# Patient Record
Sex: FEMALE | Race: WHITE | HISPANIC OR LATINO | Employment: UNEMPLOYED | URBAN - METROPOLITAN AREA
[De-identification: names, ages, dates, MRNs, and addresses within clinical notes are randomized per-mention and may not be internally consistent; named-entity substitution may affect disease eponyms.]

---

## 2023-04-11 ENCOUNTER — APPOINTMENT (OUTPATIENT)
Dept: GENERAL RADIOLOGY | Facility: HOSPITAL | Age: 38
DRG: 981 | End: 2023-04-11
Payer: COMMERCIAL

## 2023-04-11 ENCOUNTER — ANESTHESIA EVENT (OUTPATIENT)
Dept: PERIOP | Facility: HOSPITAL | Age: 38
DRG: 981 | End: 2023-04-11
Payer: COMMERCIAL

## 2023-04-11 ENCOUNTER — HOSPITAL ENCOUNTER (INPATIENT)
Facility: HOSPITAL | Age: 38
LOS: 2 days | Discharge: HOME | DRG: 981 | End: 2023-04-13
Attending: EMERGENCY MEDICINE | Admitting: STUDENT IN AN ORGANIZED HEALTH CARE EDUCATION/TRAINING PROGRAM
Payer: COMMERCIAL

## 2023-04-11 ENCOUNTER — APPOINTMENT (OUTPATIENT)
Dept: CT IMAGING | Facility: HOSPITAL | Age: 38
DRG: 981 | End: 2023-04-11
Payer: COMMERCIAL

## 2023-04-11 ENCOUNTER — ANESTHESIA (OUTPATIENT)
Dept: PERIOP | Facility: HOSPITAL | Age: 38
DRG: 981 | End: 2023-04-11
Payer: COMMERCIAL

## 2023-04-11 DIAGNOSIS — R22.1 MASS OF RIGHT SIDE OF NECK: ICD-10-CM

## 2023-04-11 DIAGNOSIS — R93.89: Primary | ICD-10-CM

## 2023-04-11 LAB
ALBUMIN SERPL-MCNC: 4.7 G/DL (ref 3.5–5.2)
ALBUMIN/GLOB SERPL: 1.7 G/DL
ALP SERPL-CCNC: 75 U/L (ref 39–117)
ALT SERPL W P-5'-P-CCNC: 13 U/L (ref 1–33)
ANION GAP SERPL CALCULATED.3IONS-SCNC: 11.9 MMOL/L (ref 5–15)
AST SERPL-CCNC: 12 U/L (ref 1–32)
BASOPHILS # BLD AUTO: 0.09 10*3/MM3 (ref 0–0.2)
BASOPHILS NFR BLD AUTO: 0.7 % (ref 0–1.5)
BILIRUB SERPL-MCNC: 0.5 MG/DL (ref 0–1.2)
BUN SERPL-MCNC: 9 MG/DL (ref 6–20)
BUN/CREAT SERPL: 12.7 (ref 7–25)
CALCIUM SPEC-SCNC: 10 MG/DL (ref 8.6–10.5)
CHLORIDE SERPL-SCNC: 101 MMOL/L (ref 98–107)
CO2 SERPL-SCNC: 27.1 MMOL/L (ref 22–29)
CREAT SERPL-MCNC: 0.71 MG/DL (ref 0.57–1)
D-LACTATE SERPL-SCNC: 0.7 MMOL/L (ref 0.5–2)
DEPRECATED RDW RBC AUTO: 42.5 FL (ref 37–54)
EGFRCR SERPLBLD CKD-EPI 2021: 113.9 ML/MIN/1.73
EOSINOPHIL # BLD AUTO: 0.96 10*3/MM3 (ref 0–0.4)
EOSINOPHIL NFR BLD AUTO: 7.2 % (ref 0.3–6.2)
ERYTHROCYTE [DISTWIDTH] IN BLOOD BY AUTOMATED COUNT: 12.4 % (ref 12.3–15.4)
GEN 5 2HR TROPONIN T REFLEX: <6 NG/L
GLOBULIN UR ELPH-MCNC: 2.7 GM/DL
GLUCOSE SERPL-MCNC: 101 MG/DL (ref 65–99)
HCG SERPL QL: NEGATIVE
HCT VFR BLD AUTO: 48.5 % (ref 34–46.6)
HGB BLD-MCNC: 15.2 G/DL (ref 12–15.9)
HOLD SPECIMEN: NORMAL
IMM GRANULOCYTES # BLD AUTO: 0.04 10*3/MM3 (ref 0–0.05)
IMM GRANULOCYTES NFR BLD AUTO: 0.3 % (ref 0–0.5)
LYMPHOCYTES # BLD AUTO: 2.11 10*3/MM3 (ref 0.7–3.1)
LYMPHOCYTES NFR BLD AUTO: 15.8 % (ref 19.6–45.3)
MCH RBC QN AUTO: 29.2 PG (ref 26.6–33)
MCHC RBC AUTO-ENTMCNC: 31.3 G/DL (ref 31.5–35.7)
MCV RBC AUTO: 93.1 FL (ref 79–97)
MONOCYTES # BLD AUTO: 0.89 10*3/MM3 (ref 0.1–0.9)
MONOCYTES NFR BLD AUTO: 6.6 % (ref 5–12)
NEUTROPHILS NFR BLD AUTO: 69.4 % (ref 42.7–76)
NEUTROPHILS NFR BLD AUTO: 9.3 10*3/MM3 (ref 1.7–7)
NRBC BLD AUTO-RTO: 0 /100 WBC (ref 0–0.2)
PLATELET # BLD AUTO: 466 10*3/MM3 (ref 140–450)
PMV BLD AUTO: 9.8 FL (ref 6–12)
POTASSIUM SERPL-SCNC: 4.1 MMOL/L (ref 3.5–5.2)
PROT SERPL-MCNC: 7.4 G/DL (ref 6–8.5)
QT INTERVAL: 350 MS
QT INTERVAL: 373 MS
RBC # BLD AUTO: 5.21 10*6/MM3 (ref 3.77–5.28)
SODIUM SERPL-SCNC: 140 MMOL/L (ref 136–145)
T4 FREE SERPL-MCNC: 1.06 NG/DL (ref 0.93–1.7)
TROPONIN T DELTA: NORMAL
TROPONIN T SERPL HS-MCNC: <6 NG/L
TSH SERPL DL<=0.05 MIU/L-ACNC: 1.86 UIU/ML (ref 0.27–4.2)
WBC NRBC COR # BLD: 13.39 10*3/MM3 (ref 3.4–10.8)
WHOLE BLOOD HOLD COAG: NORMAL
WHOLE BLOOD HOLD SPECIMEN: NORMAL

## 2023-04-11 PROCEDURE — G0378 HOSPITAL OBSERVATION PER HR: HCPCS

## 2023-04-11 PROCEDURE — 25010000002 PROPOFOL 10 MG/ML EMULSION: Performed by: ANESTHESIOLOGY

## 2023-04-11 PROCEDURE — 93005 ELECTROCARDIOGRAM TRACING: CPT | Performed by: EMERGENCY MEDICINE

## 2023-04-11 PROCEDURE — 25010000002 PHENYLEPHRINE 10 MG/ML SOLUTION: Performed by: ANESTHESIOLOGY

## 2023-04-11 PROCEDURE — 25510000001 IOPAMIDOL 61 % SOLUTION: Performed by: EMERGENCY MEDICINE

## 2023-04-11 PROCEDURE — 25010000002 DIPHENHYDRAMINE PER 50 MG: Performed by: ANESTHESIOLOGY

## 2023-04-11 PROCEDURE — 84484 ASSAY OF TROPONIN QUANT: CPT

## 2023-04-11 PROCEDURE — 25010000002 FENTANYL CITRATE (PF) 50 MCG/ML SOLUTION: Performed by: ANESTHESIOLOGY

## 2023-04-11 PROCEDURE — 25010000002 HYDROMORPHONE 1 MG/ML SOLUTION: Performed by: NURSE ANESTHETIST, CERTIFIED REGISTERED

## 2023-04-11 PROCEDURE — 25010000002 MIDAZOLAM PER 1 MG: Performed by: ANESTHESIOLOGY

## 2023-04-11 PROCEDURE — 0 DIATRIZOATE MEGLUMINE & SODIUM PER 1 ML: Performed by: EMERGENCY MEDICINE

## 2023-04-11 PROCEDURE — 43235 EGD DIAGNOSTIC BRUSH WASH: CPT | Performed by: SURGERY

## 2023-04-11 PROCEDURE — 0BJ08ZZ INSPECTION OF TRACHEOBRONCHIAL TREE, VIA NATURAL OR ARTIFICIAL OPENING ENDOSCOPIC: ICD-10-PCS | Performed by: SURGERY

## 2023-04-11 PROCEDURE — 36415 COLL VENOUS BLD VENIPUNCTURE: CPT

## 2023-04-11 PROCEDURE — 70491 CT SOFT TISSUE NECK W/DYE: CPT

## 2023-04-11 PROCEDURE — 87040 BLOOD CULTURE FOR BACTERIA: CPT | Performed by: EMERGENCY MEDICINE

## 2023-04-11 PROCEDURE — 99285 EMERGENCY DEPT VISIT HI MDM: CPT

## 2023-04-11 PROCEDURE — 25010000002 HYDROMORPHONE PER 4 MG: Performed by: ANESTHESIOLOGY

## 2023-04-11 PROCEDURE — 25010000002 VANCOMYCIN 10 G RECONSTITUTED SOLUTION: Performed by: EMERGENCY MEDICINE

## 2023-04-11 PROCEDURE — 88307 TISSUE EXAM BY PATHOLOGIST: CPT | Performed by: SURGERY

## 2023-04-11 PROCEDURE — 71046 X-RAY EXAM CHEST 2 VIEWS: CPT

## 2023-04-11 PROCEDURE — 0WB60ZZ EXCISION OF NECK, OPEN APPROACH: ICD-10-PCS | Performed by: SURGERY

## 2023-04-11 PROCEDURE — 25010000002 DEXAMETHASONE PER 1 MG: Performed by: ANESTHESIOLOGY

## 2023-04-11 PROCEDURE — 21555 EXC NECK LES SC < 3 CM: CPT | Performed by: REGISTERED NURSE

## 2023-04-11 PROCEDURE — 80050 GENERAL HEALTH PANEL: CPT

## 2023-04-11 PROCEDURE — 84703 CHORIONIC GONADOTROPIN ASSAY: CPT

## 2023-04-11 PROCEDURE — 0DJ08ZZ INSPECTION OF UPPER INTESTINAL TRACT, VIA NATURAL OR ARTIFICIAL OPENING ENDOSCOPIC: ICD-10-PCS | Performed by: SURGERY

## 2023-04-11 PROCEDURE — 25010000002 ONDANSETRON PER 1 MG: Performed by: NURSE ANESTHETIST, CERTIFIED REGISTERED

## 2023-04-11 PROCEDURE — 25510000001 IOPAMIDOL PER 1 ML: Performed by: EMERGENCY MEDICINE

## 2023-04-11 PROCEDURE — 99233 SBSQ HOSP IP/OBS HIGH 50: CPT | Performed by: SURGERY

## 2023-04-11 PROCEDURE — 74220 X-RAY XM ESOPHAGUS 1CNTRST: CPT

## 2023-04-11 PROCEDURE — 93010 ELECTROCARDIOGRAM REPORT: CPT | Performed by: INTERNAL MEDICINE

## 2023-04-11 PROCEDURE — 93005 ELECTROCARDIOGRAM TRACING: CPT

## 2023-04-11 PROCEDURE — 71045 X-RAY EXAM CHEST 1 VIEW: CPT

## 2023-04-11 PROCEDURE — 84439 ASSAY OF FREE THYROXINE: CPT | Performed by: EMERGENCY MEDICINE

## 2023-04-11 PROCEDURE — 25010000002 PIPERACILLIN SOD-TAZOBACTAM PER 1 G: Performed by: EMERGENCY MEDICINE

## 2023-04-11 PROCEDURE — 83605 ASSAY OF LACTIC ACID: CPT | Performed by: EMERGENCY MEDICINE

## 2023-04-11 PROCEDURE — 0WJ60ZZ INSPECTION OF NECK, OPEN APPROACH: ICD-10-PCS | Performed by: SURGERY

## 2023-04-11 PROCEDURE — 71275 CT ANGIOGRAPHY CHEST: CPT

## 2023-04-11 PROCEDURE — 21555 EXC NECK LES SC < 3 CM: CPT | Performed by: SURGERY

## 2023-04-11 DEVICE — LIGACLIP MCA MULTIPLE CLIP APPLIERS, 20 MEDIUM CLIPS
Type: IMPLANTABLE DEVICE | Site: NECK | Status: FUNCTIONAL
Brand: LIGACLIP

## 2023-04-11 RX ORDER — ROCURONIUM BROMIDE 10 MG/ML
INJECTION, SOLUTION INTRAVENOUS AS NEEDED
Status: DISCONTINUED | OUTPATIENT
Start: 2023-04-11 | End: 2023-04-11 | Stop reason: SURG

## 2023-04-11 RX ORDER — PROMETHAZINE HYDROCHLORIDE 25 MG/1
25 SUPPOSITORY RECTAL ONCE AS NEEDED
Status: DISCONTINUED | OUTPATIENT
Start: 2023-04-11 | End: 2023-04-11

## 2023-04-11 RX ORDER — SODIUM CHLORIDE, SODIUM LACTATE, POTASSIUM CHLORIDE, CALCIUM CHLORIDE 600; 310; 30; 20 MG/100ML; MG/100ML; MG/100ML; MG/100ML
9 INJECTION, SOLUTION INTRAVENOUS CONTINUOUS
Status: DISCONTINUED | OUTPATIENT
Start: 2023-04-11 | End: 2023-04-13

## 2023-04-11 RX ORDER — ONDANSETRON 2 MG/ML
INJECTION INTRAMUSCULAR; INTRAVENOUS AS NEEDED
Status: DISCONTINUED | OUTPATIENT
Start: 2023-04-11 | End: 2023-04-11 | Stop reason: SURG

## 2023-04-11 RX ORDER — PROMETHAZINE HYDROCHLORIDE 25 MG/1
25 TABLET ORAL ONCE AS NEEDED
Status: DISCONTINUED | OUTPATIENT
Start: 2023-04-11 | End: 2023-04-11

## 2023-04-11 RX ORDER — IPRATROPIUM BROMIDE AND ALBUTEROL SULFATE 2.5; .5 MG/3ML; MG/3ML
3 SOLUTION RESPIRATORY (INHALATION) ONCE AS NEEDED
Status: DISCONTINUED | OUTPATIENT
Start: 2023-04-11 | End: 2023-04-11

## 2023-04-11 RX ORDER — DIPHENHYDRAMINE HYDROCHLORIDE 50 MG/ML
12.5 INJECTION INTRAMUSCULAR; INTRAVENOUS
Status: DISCONTINUED | OUTPATIENT
Start: 2023-04-11 | End: 2023-04-11

## 2023-04-11 RX ORDER — HYDROCODONE BITARTRATE AND ACETAMINOPHEN 7.5; 325 MG/1; MG/1
1 TABLET ORAL EVERY 4 HOURS PRN
Status: DISCONTINUED | OUTPATIENT
Start: 2023-04-11 | End: 2023-04-11

## 2023-04-11 RX ORDER — ONDANSETRON 2 MG/ML
4 INJECTION INTRAMUSCULAR; INTRAVENOUS ONCE AS NEEDED
Status: DISCONTINUED | OUTPATIENT
Start: 2023-04-11 | End: 2023-04-11

## 2023-04-11 RX ORDER — KETOROLAC TROMETHAMINE 30 MG/ML
15 INJECTION, SOLUTION INTRAMUSCULAR; INTRAVENOUS EVERY 8 HOURS
Status: DISCONTINUED | OUTPATIENT
Start: 2023-04-11 | End: 2023-04-11 | Stop reason: HOSPADM

## 2023-04-11 RX ORDER — ONDANSETRON 2 MG/ML
4 INJECTION INTRAMUSCULAR; INTRAVENOUS EVERY 6 HOURS PRN
Status: DISCONTINUED | OUTPATIENT
Start: 2023-04-11 | End: 2023-04-13 | Stop reason: HOSPADM

## 2023-04-11 RX ORDER — HYDROCODONE BITARTRATE AND ACETAMINOPHEN 5; 325 MG/1; MG/1
1 TABLET ORAL ONCE AS NEEDED
Status: DISCONTINUED | OUTPATIENT
Start: 2023-04-11 | End: 2023-04-11

## 2023-04-11 RX ORDER — SODIUM CHLORIDE 0.9 % (FLUSH) 0.9 %
3 SYRINGE (ML) INJECTION EVERY 12 HOURS SCHEDULED
Status: DISCONTINUED | OUTPATIENT
Start: 2023-04-11 | End: 2023-04-11 | Stop reason: HOSPADM

## 2023-04-11 RX ORDER — FENTANYL CITRATE 50 UG/ML
50 INJECTION, SOLUTION INTRAMUSCULAR; INTRAVENOUS
Status: DISCONTINUED | OUTPATIENT
Start: 2023-04-11 | End: 2023-04-11

## 2023-04-11 RX ORDER — SODIUM CHLORIDE, SODIUM LACTATE, POTASSIUM CHLORIDE, CALCIUM CHLORIDE 600; 310; 30; 20 MG/100ML; MG/100ML; MG/100ML; MG/100ML
INJECTION, SOLUTION INTRAVENOUS CONTINUOUS PRN
Status: DISCONTINUED | OUTPATIENT
Start: 2023-04-11 | End: 2023-04-11 | Stop reason: SURG

## 2023-04-11 RX ORDER — LIDOCAINE HYDROCHLORIDE 20 MG/ML
INJECTION, SOLUTION INFILTRATION; PERINEURAL AS NEEDED
Status: DISCONTINUED | OUTPATIENT
Start: 2023-04-11 | End: 2023-04-11 | Stop reason: SURG

## 2023-04-11 RX ORDER — ASPIRIN 325 MG
325 TABLET ORAL ONCE
Status: COMPLETED | OUTPATIENT
Start: 2023-04-11 | End: 2023-04-11

## 2023-04-11 RX ORDER — NALOXONE HCL 0.4 MG/ML
0.2 VIAL (ML) INJECTION AS NEEDED
Status: DISCONTINUED | OUTPATIENT
Start: 2023-04-11 | End: 2023-04-11

## 2023-04-11 RX ORDER — ENOXAPARIN SODIUM 100 MG/ML
40 INJECTION SUBCUTANEOUS NIGHTLY
Status: DISCONTINUED | OUTPATIENT
Start: 2023-04-12 | End: 2023-04-13 | Stop reason: HOSPADM

## 2023-04-11 RX ORDER — LIDOCAINE HYDROCHLORIDE 10 MG/ML
0.5 INJECTION, SOLUTION EPIDURAL; INFILTRATION; INTRACAUDAL; PERINEURAL ONCE AS NEEDED
Status: DISCONTINUED | OUTPATIENT
Start: 2023-04-11 | End: 2023-04-11 | Stop reason: HOSPADM

## 2023-04-11 RX ORDER — SODIUM CHLORIDE 0.9 % (FLUSH) 0.9 %
3-10 SYRINGE (ML) INJECTION AS NEEDED
Status: DISCONTINUED | OUTPATIENT
Start: 2023-04-11 | End: 2023-04-11 | Stop reason: HOSPADM

## 2023-04-11 RX ORDER — DROPERIDOL 2.5 MG/ML
0.62 INJECTION, SOLUTION INTRAMUSCULAR; INTRAVENOUS
Status: DISCONTINUED | OUTPATIENT
Start: 2023-04-11 | End: 2023-04-11

## 2023-04-11 RX ORDER — POLYETHYLENE GLYCOL 3350 17 G/17G
17 POWDER, FOR SOLUTION ORAL DAILY
Status: DISCONTINUED | OUTPATIENT
Start: 2023-04-12 | End: 2023-04-13 | Stop reason: HOSPADM

## 2023-04-11 RX ORDER — DOCUSATE SODIUM 100 MG/1
100 CAPSULE, LIQUID FILLED ORAL 2 TIMES DAILY
Status: DISCONTINUED | OUTPATIENT
Start: 2023-04-11 | End: 2023-04-13 | Stop reason: HOSPADM

## 2023-04-11 RX ORDER — MAGNESIUM HYDROXIDE 1200 MG/15ML
LIQUID ORAL AS NEEDED
Status: DISCONTINUED | OUTPATIENT
Start: 2023-04-11 | End: 2023-04-11 | Stop reason: HOSPADM

## 2023-04-11 RX ORDER — ACETAMINOPHEN 325 MG/1
650 TABLET ORAL EVERY 4 HOURS PRN
Status: DISCONTINUED | OUTPATIENT
Start: 2023-04-11 | End: 2023-04-11

## 2023-04-11 RX ORDER — PHENYLEPHRINE HYDROCHLORIDE 10 MG/ML
INJECTION INTRAVENOUS AS NEEDED
Status: DISCONTINUED | OUTPATIENT
Start: 2023-04-11 | End: 2023-04-11 | Stop reason: SURG

## 2023-04-11 RX ORDER — SODIUM CHLORIDE, SODIUM LACTATE, POTASSIUM CHLORIDE, CALCIUM CHLORIDE 600; 310; 30; 20 MG/100ML; MG/100ML; MG/100ML; MG/100ML
75 INJECTION, SOLUTION INTRAVENOUS CONTINUOUS
Status: DISCONTINUED | OUTPATIENT
Start: 2023-04-11 | End: 2023-04-13

## 2023-04-11 RX ORDER — MIDAZOLAM HYDROCHLORIDE 1 MG/ML
INJECTION INTRAMUSCULAR; INTRAVENOUS AS NEEDED
Status: DISCONTINUED | OUTPATIENT
Start: 2023-04-11 | End: 2023-04-11 | Stop reason: SURG

## 2023-04-11 RX ORDER — LABETALOL HYDROCHLORIDE 5 MG/ML
5 INJECTION, SOLUTION INTRAVENOUS
Status: DISCONTINUED | OUTPATIENT
Start: 2023-04-11 | End: 2023-04-11

## 2023-04-11 RX ORDER — DIPHENHYDRAMINE HYDROCHLORIDE 50 MG/ML
25 INJECTION INTRAMUSCULAR; INTRAVENOUS ONCE
Status: COMPLETED | OUTPATIENT
Start: 2023-04-11 | End: 2023-04-11

## 2023-04-11 RX ORDER — UREA 10 %
3 LOTION (ML) TOPICAL NIGHTLY PRN
Status: DISCONTINUED | OUTPATIENT
Start: 2023-04-11 | End: 2023-04-13 | Stop reason: HOSPADM

## 2023-04-11 RX ORDER — HYDRALAZINE HYDROCHLORIDE 20 MG/ML
5 INJECTION INTRAMUSCULAR; INTRAVENOUS
Status: DISCONTINUED | OUTPATIENT
Start: 2023-04-11 | End: 2023-04-11

## 2023-04-11 RX ORDER — PROPOFOL 10 MG/ML
VIAL (ML) INTRAVENOUS AS NEEDED
Status: DISCONTINUED | OUTPATIENT
Start: 2023-04-11 | End: 2023-04-11 | Stop reason: SURG

## 2023-04-11 RX ORDER — HYDROMORPHONE HYDROCHLORIDE 1 MG/ML
0.5 INJECTION, SOLUTION INTRAMUSCULAR; INTRAVENOUS; SUBCUTANEOUS
Status: DISCONTINUED | OUTPATIENT
Start: 2023-04-11 | End: 2023-04-13 | Stop reason: HOSPADM

## 2023-04-11 RX ORDER — SODIUM CHLORIDE 0.9 % (FLUSH) 0.9 %
10 SYRINGE (ML) INJECTION AS NEEDED
Status: DISCONTINUED | OUTPATIENT
Start: 2023-04-11 | End: 2023-04-11

## 2023-04-11 RX ORDER — SODIUM CHLORIDE 9 MG/ML
125 INJECTION, SOLUTION INTRAVENOUS CONTINUOUS
Status: DISCONTINUED | OUTPATIENT
Start: 2023-04-11 | End: 2023-04-13

## 2023-04-11 RX ORDER — ALBUTEROL SULFATE 2.5 MG/3ML
2.5 SOLUTION RESPIRATORY (INHALATION) EVERY 4 HOURS PRN
Status: DISCONTINUED | OUTPATIENT
Start: 2023-04-11 | End: 2023-04-13 | Stop reason: HOSPADM

## 2023-04-11 RX ORDER — HYDROMORPHONE HYDROCHLORIDE 1 MG/ML
0.5 INJECTION, SOLUTION INTRAMUSCULAR; INTRAVENOUS; SUBCUTANEOUS
Status: DISCONTINUED | OUTPATIENT
Start: 2023-04-11 | End: 2023-04-11

## 2023-04-11 RX ORDER — FLUMAZENIL 0.1 MG/ML
0.2 INJECTION INTRAVENOUS AS NEEDED
Status: DISCONTINUED | OUTPATIENT
Start: 2023-04-11 | End: 2023-04-11

## 2023-04-11 RX ORDER — ONDANSETRON 4 MG/1
4 TABLET, FILM COATED ORAL EVERY 6 HOURS PRN
Status: DISCONTINUED | OUTPATIENT
Start: 2023-04-11 | End: 2023-04-13 | Stop reason: HOSPADM

## 2023-04-11 RX ORDER — EPHEDRINE SULFATE 50 MG/ML
5 INJECTION, SOLUTION INTRAVENOUS ONCE AS NEEDED
Status: DISCONTINUED | OUTPATIENT
Start: 2023-04-11 | End: 2023-04-11

## 2023-04-11 RX ORDER — MIDAZOLAM HYDROCHLORIDE 1 MG/ML
1 INJECTION INTRAMUSCULAR; INTRAVENOUS
Status: DISCONTINUED | OUTPATIENT
Start: 2023-04-11 | End: 2023-04-11 | Stop reason: HOSPADM

## 2023-04-11 RX ORDER — NITROGLYCERIN 0.4 MG/1
0.4 TABLET SUBLINGUAL
Status: DISCONTINUED | OUTPATIENT
Start: 2023-04-11 | End: 2023-04-13 | Stop reason: HOSPADM

## 2023-04-11 RX ORDER — FENTANYL CITRATE 50 UG/ML
INJECTION, SOLUTION INTRAMUSCULAR; INTRAVENOUS AS NEEDED
Status: DISCONTINUED | OUTPATIENT
Start: 2023-04-11 | End: 2023-04-11 | Stop reason: SURG

## 2023-04-11 RX ORDER — ACETAMINOPHEN 500 MG
1000 TABLET ORAL 3 TIMES DAILY
Status: DISCONTINUED | OUTPATIENT
Start: 2023-04-11 | End: 2023-04-13 | Stop reason: HOSPADM

## 2023-04-11 RX ORDER — DEXAMETHASONE SODIUM PHOSPHATE 10 MG/ML
8 INJECTION INTRAMUSCULAR; INTRAVENOUS EVERY 6 HOURS
Status: DISCONTINUED | OUTPATIENT
Start: 2023-04-11 | End: 2023-04-11 | Stop reason: HOSPADM

## 2023-04-11 RX ORDER — BUDESONIDE AND FORMOTEROL FUMARATE DIHYDRATE 160; 4.5 UG/1; UG/1
2 AEROSOL RESPIRATORY (INHALATION)
Status: DISCONTINUED | OUTPATIENT
Start: 2023-04-11 | End: 2023-04-13 | Stop reason: HOSPADM

## 2023-04-11 RX ORDER — FENTANYL CITRATE 50 UG/ML
50 INJECTION, SOLUTION INTRAMUSCULAR; INTRAVENOUS
Status: DISCONTINUED | OUTPATIENT
Start: 2023-04-11 | End: 2023-04-11 | Stop reason: HOSPADM

## 2023-04-11 RX ORDER — FAMOTIDINE 10 MG/ML
20 INJECTION, SOLUTION INTRAVENOUS ONCE
Status: COMPLETED | OUTPATIENT
Start: 2023-04-11 | End: 2023-04-11

## 2023-04-11 RX ORDER — MEPERIDINE HYDROCHLORIDE 25 MG/ML
12.5 INJECTION INTRAMUSCULAR; INTRAVENOUS; SUBCUTANEOUS
Status: DISCONTINUED | OUTPATIENT
Start: 2023-04-11 | End: 2023-04-11

## 2023-04-11 RX ORDER — METRONIDAZOLE 500 MG/100ML
500 INJECTION, SOLUTION INTRAVENOUS ONCE
Status: COMPLETED | OUTPATIENT
Start: 2023-04-11 | End: 2023-04-11

## 2023-04-11 RX ORDER — SODIUM CHLORIDE 0.9 % (FLUSH) 0.9 %
10 SYRINGE (ML) INJECTION AS NEEDED
Status: DISCONTINUED | OUTPATIENT
Start: 2023-04-11 | End: 2023-04-13 | Stop reason: HOSPADM

## 2023-04-11 RX ADMIN — IOPAMIDOL 95 ML: 755 INJECTION, SOLUTION INTRAVENOUS at 15:04

## 2023-04-11 RX ADMIN — HYDROMORPHONE HYDROCHLORIDE 0.5 MG: 1 INJECTION, SOLUTION INTRAMUSCULAR; INTRAVENOUS; SUBCUTANEOUS at 23:15

## 2023-04-11 RX ADMIN — LIDOCAINE HYDROCHLORIDE 80 MG: 20 INJECTION, SOLUTION INFILTRATION; PERINEURAL at 20:32

## 2023-04-11 RX ADMIN — ROCURONIUM BROMIDE 70 MG: 10 INJECTION, SOLUTION INTRAVENOUS at 20:32

## 2023-04-11 RX ADMIN — SUGAMMADEX 200 MG: 100 INJECTION, SOLUTION INTRAVENOUS at 22:35

## 2023-04-11 RX ADMIN — PHENYLEPHRINE HYDROCHLORIDE 100 MCG: 10 INJECTION, SOLUTION INTRAVENOUS at 21:37

## 2023-04-11 RX ADMIN — DIATRIZOATE MEGLUMINE AND DIATRIZOATE SODIUM 120 ML: 600; 100 SOLUTION ORAL; RECTAL at 18:00

## 2023-04-11 RX ADMIN — METRONIDAZOLE 500 MG: 500 INJECTION, SOLUTION INTRAVENOUS at 17:40

## 2023-04-11 RX ADMIN — MIDAZOLAM 2 MG: 1 INJECTION INTRAMUSCULAR; INTRAVENOUS at 20:23

## 2023-04-11 RX ADMIN — FAMOTIDINE 20 MG: 10 INJECTION INTRAVENOUS at 20:01

## 2023-04-11 RX ADMIN — SODIUM CHLORIDE, POTASSIUM CHLORIDE, SODIUM LACTATE AND CALCIUM CHLORIDE: 600; 310; 30; 20 INJECTION, SOLUTION INTRAVENOUS at 20:26

## 2023-04-11 RX ADMIN — ASPIRIN 325 MG: 325 TABLET ORAL at 12:02

## 2023-04-11 RX ADMIN — PHENYLEPHRINE HYDROCHLORIDE 100 MCG: 10 INJECTION, SOLUTION INTRAVENOUS at 20:55

## 2023-04-11 RX ADMIN — SODIUM CHLORIDE, POTASSIUM CHLORIDE, SODIUM LACTATE AND CALCIUM CHLORIDE 9 ML/HR: 600; 310; 30; 20 INJECTION, SOLUTION INTRAVENOUS at 20:02

## 2023-04-11 RX ADMIN — SODIUM CHLORIDE, POTASSIUM CHLORIDE, SODIUM LACTATE AND CALCIUM CHLORIDE: 600; 310; 30; 20 INJECTION, SOLUTION INTRAVENOUS at 21:23

## 2023-04-11 RX ADMIN — PROPOFOL 30 MG: 10 INJECTION, EMULSION INTRAVENOUS at 20:34

## 2023-04-11 RX ADMIN — HYDROMORPHONE HYDROCHLORIDE 0.5 MG: 1 INJECTION, SOLUTION INTRAMUSCULAR; INTRAVENOUS; SUBCUTANEOUS at 22:33

## 2023-04-11 RX ADMIN — ONDANSETRON 4 MG: 2 INJECTION INTRAMUSCULAR; INTRAVENOUS at 22:11

## 2023-04-11 RX ADMIN — DEXAMETHASONE SODIUM PHOSPHATE 8 MG: 10 INJECTION INTRAMUSCULAR; INTRAVENOUS at 20:01

## 2023-04-11 RX ADMIN — TAZOBACTAM SODIUM AND PIPERACILLIN SODIUM 3.38 G: 375; 3 INJECTION, SOLUTION INTRAVENOUS at 16:39

## 2023-04-11 RX ADMIN — VANCOMYCIN HYDROCHLORIDE 1500 MG: 10 INJECTION, POWDER, LYOPHILIZED, FOR SOLUTION INTRAVENOUS at 18:48

## 2023-04-11 RX ADMIN — PHENYLEPHRINE HYDROCHLORIDE 100 MCG: 10 INJECTION, SOLUTION INTRAVENOUS at 20:50

## 2023-04-11 RX ADMIN — DIPHENHYDRAMINE HYDROCHLORIDE 25 MG: 50 INJECTION, SOLUTION INTRAMUSCULAR; INTRAVENOUS at 20:01

## 2023-04-11 RX ADMIN — SODIUM CHLORIDE 125 ML/HR: 9 INJECTION, SOLUTION INTRAVENOUS at 17:31

## 2023-04-11 RX ADMIN — PROPOFOL 100 MG: 10 INJECTION, EMULSION INTRAVENOUS at 20:32

## 2023-04-11 RX ADMIN — HYDROMORPHONE HYDROCHLORIDE 0.5 MG: 1 INJECTION, SOLUTION INTRAMUSCULAR; INTRAVENOUS; SUBCUTANEOUS at 23:00

## 2023-04-11 RX ADMIN — FENTANYL CITRATE 50 MCG: 50 INJECTION, SOLUTION INTRAMUSCULAR; INTRAVENOUS at 20:32

## 2023-04-11 RX ADMIN — IOPAMIDOL 93 ML: 612 INJECTION, SOLUTION INTRAVENOUS at 17:19

## 2023-04-11 NOTE — ED PROVIDER NOTES
EMERGENCY DEPARTMENT ENCOUNTER    Room Number:  35/35  Date seen:  4/11/2023  PCP: Provider, No Known  Historian: Patient      HPI:  Chief Complaint: Chest pain  A complete HPI/ROS/PMH/PSH/SH/FH are unobtainable due to: Nothing  Context: Wilda Mayo is a 37 y.o. female who presents to the ED c/o chest pain for a couple of days.  Patient reports the pain is localized to the right upper chest, right scapular region, and also her neck.  The pain is worse with coughing.  She also reports that her anterior neck is tender to touch.  She denies fever or chills.  She denies shortness of breath.  She does have a history of asthma.  She denies smoking.  No history of hypertension, hyperlipidemia, diabetes.  She takes no birth control or estrogen supplement.  She denies recent prolonged car ride or immobilization.  She did drive to [place] from [place] but that was 5 months ago.  She denies history of DVT or PE.  No history of coronary disease.  She has never had an EKG before to her knowledge.  She denies history of thyroid disorder.            PAST MEDICAL HISTORY  Active Ambulatory Problems     Diagnosis Date Noted   • No Active Ambulatory Problems     Resolved Ambulatory Problems     Diagnosis Date Noted   • No Resolved Ambulatory Problems     Past Medical History:   Diagnosis Date   • Asthma          PAST SURGICAL HISTORY  History reviewed. No pertinent surgical history.      FAMILY HISTORY  History reviewed. No pertinent family history.      SOCIAL HISTORY  Social History     Socioeconomic History   • Marital status:    Tobacco Use   • Smoking status: Never     Passive exposure: Never   • Smokeless tobacco: Never   Vaping Use   • Vaping Use: Never used   Substance and Sexual Activity   • Alcohol use: Defer   • Drug use: Not Currently   • Sexual activity: Never         ALLERGIES  Patient has no known allergies.        REVIEW OF SYSTEMS  Review of Systems   Review of all 14 systems is negative other than  stated in the HPI above.      PHYSICAL EXAM  ED Triage Vitals   Temp Heart Rate Resp BP SpO2   04/11/23 1110 04/11/23 1110 04/11/23 1110 04/11/23 1130 04/11/23 1110   99.9 °F (37.7 °C) 88 16 107/75 99 %      Temp src Heart Rate Source Patient Position BP Location FiO2 (%)   -- -- 04/11/23 1130 04/11/23 1130 --     Sitting Left arm        Physical Exam      GENERAL: Awake and alert, no acute distress  HENT: nares patent, oropharynx clear, neck supple, no thyromegaly, no cervical adenopathy, there is anterior neck tenderness without erythema or warmth, maxillary central incisors are broken  EYES: no scleral icterus,, pupils 3 mm reactive bilaterally, EOMI  CV: regular rhythm, normal rate  RESPIRATORY: normal effort  ABDOMEN: soft, nondistended, nontender throughout  MUSCULOSKELETAL: no deformity, no calf tenderness present bilaterally, no peripheral edema  NEURO: alert, moves all extremities, follows commands, cranial nerves II through XII grossly intact with speech fluent and clear  PSYCH:  calm, cooperative  SKIN: warm, dry    Vital signs and nursing notes reviewed.          LAB RESULTS  Recent Results (from the past 24 hour(s))   ECG 12 Lead Chest Pain    Collection Time: 04/11/23 11:23 AM   Result Value Ref Range    QT Interval 373 ms   Comprehensive Metabolic Panel    Collection Time: 04/11/23 11:45 AM    Specimen: Blood   Result Value Ref Range    Glucose 101 (H) 65 - 99 mg/dL    BUN 9 6 - 20 mg/dL    Creatinine 0.71 0.57 - 1.00 mg/dL    Sodium 140 136 - 145 mmol/L    Potassium 4.1 3.5 - 5.2 mmol/L    Chloride 101 98 - 107 mmol/L    CO2 27.1 22.0 - 29.0 mmol/L    Calcium 10.0 8.6 - 10.5 mg/dL    Total Protein 7.4 6.0 - 8.5 g/dL    Albumin 4.7 3.5 - 5.2 g/dL    ALT (SGPT) 13 1 - 33 U/L    AST (SGOT) 12 1 - 32 U/L    Alkaline Phosphatase 75 39 - 117 U/L    Total Bilirubin 0.5 0.0 - 1.2 mg/dL    Globulin 2.7 gm/dL    A/G Ratio 1.7 g/dL    BUN/Creatinine Ratio 12.7 7.0 - 25.0    Anion Gap 11.9 5.0 - 15.0 mmol/L     eGFR 113.9 >60.0 mL/min/1.73   High Sensitivity Troponin T    Collection Time: 04/11/23 11:45 AM    Specimen: Blood   Result Value Ref Range    HS Troponin T <6 <10 ng/L   hCG, Serum, Qualitative    Collection Time: 04/11/23 11:45 AM    Specimen: Blood   Result Value Ref Range    HCG Qualitative Negative Negative   Green Top (Gel)    Collection Time: 04/11/23 11:45 AM   Result Value Ref Range    Extra Tube Hold for add-ons.    Lavender Top    Collection Time: 04/11/23 11:45 AM   Result Value Ref Range    Extra Tube hold for add-on    Light Blue Top    Collection Time: 04/11/23 11:45 AM   Result Value Ref Range    Extra Tube Hold for add-ons.    CBC Auto Differential    Collection Time: 04/11/23 11:45 AM    Specimen: Blood   Result Value Ref Range    WBC 13.39 (H) 3.40 - 10.80 10*3/mm3    RBC 5.21 3.77 - 5.28 10*6/mm3    Hemoglobin 15.2 12.0 - 15.9 g/dL    Hematocrit 48.5 (H) 34.0 - 46.6 %    MCV 93.1 79.0 - 97.0 fL    MCH 29.2 26.6 - 33.0 pg    MCHC 31.3 (L) 31.5 - 35.7 g/dL    RDW 12.4 12.3 - 15.4 %    RDW-SD 42.5 37.0 - 54.0 fl    MPV 9.8 6.0 - 12.0 fL    Platelets 466 (H) 140 - 450 10*3/mm3    Neutrophil % 69.4 42.7 - 76.0 %    Lymphocyte % 15.8 (L) 19.6 - 45.3 %    Monocyte % 6.6 5.0 - 12.0 %    Eosinophil % 7.2 (H) 0.3 - 6.2 %    Basophil % 0.7 0.0 - 1.5 %    Immature Grans % 0.3 0.0 - 0.5 %    Neutrophils, Absolute 9.30 (H) 1.70 - 7.00 10*3/mm3    Lymphocytes, Absolute 2.11 0.70 - 3.10 10*3/mm3    Monocytes, Absolute 0.89 0.10 - 0.90 10*3/mm3    Eosinophils, Absolute 0.96 (H) 0.00 - 0.40 10*3/mm3    Basophils, Absolute 0.09 0.00 - 0.20 10*3/mm3    Immature Grans, Absolute 0.04 0.00 - 0.05 10*3/mm3    nRBC 0.0 0.0 - 0.2 /100 WBC   TSH    Collection Time: 04/11/23 11:45 AM    Specimen: Blood   Result Value Ref Range    TSH 1.860 0.270 - 4.200 uIU/mL   T4, Free    Collection Time: 04/11/23 11:45 AM    Specimen: Blood   Result Value Ref Range    Free T4 1.06 0.93 - 1.70 ng/dL   ECG 12 Lead Chest Pain    Collection  Time: 04/11/23 12:42 PM   Result Value Ref Range    QT Interval 350 ms   High Sensitivity Troponin T 2Hr    Collection Time: 04/11/23  1:22 PM    Specimen: Arm, Right; Blood   Result Value Ref Range    HS Troponin T <6 <10 ng/L    Troponin T Delta     Lactic Acid, Plasma    Collection Time: 04/11/23  3:51 PM    Specimen: Blood   Result Value Ref Range    Lactate 0.7 0.5 - 2.0 mmol/L       Ordered the above labs and reviewed the results.        RADIOLOGY  XR Chest 2 View    Result Date: 4/11/2023  XR CHEST 2 VW-  HISTORY: Female who is 37 years-old,  chest pain  TECHNIQUE: Frontal and lateral views of the chest  COMPARISON: None available  FINDINGS: Heart, mediastinum and pulmonary vasculature are unremarkable. No focal pulmonary consolidation, pleural effusion, or pneumothorax. No acute osseous process.      No evidence for acute pulmonary process. Follow-up as clinical indications persist.  This report was finalized on 4/11/2023 1:06 PM by Dr. Marshal Vazquez M.D.      CT Angiogram Chest Pulmonary Embolism    Result Date: 4/11/2023  CT ANGIOGRAM CHEST WITH IV CONTRAST  HISTORY: 47-year-old female with chest pain and cough. Pain is localized to the right upper chest and right scapular region.  TECHNIQUE: CT angiogram chest includes axial imaging from the thoracic inlet to the upper abdomen with IV contrast and data reconstructed in coronal and sagittal planes and 3-dimensional volume rendering was performed.  FINDINGS: Beginning posterior to the lower pole of the right thyroid gland and adjacent to the right aspect of the esophagus and posterior to the right aspect of the trachea there is abnormal air that measures approximately 2.1 x 1.9 cm. This is centered at the level of the thoracic inlet and there is surrounding inflammation with soft tissue thickening and stranding within the fat. The trachea is displaced anteriorly and to the left and the lower pole of the thyroid gland is displaced anteriorly. There is  stranding/inflammation within the posterior mediastinal fat that also extends caudally posterior to the trachea and into the subcarinal region. There appears to be esophageal wall thickening.  There is no intrapulmonary arterial filling defect to diagnose a pulmonary embolus. Lungs appear clear and there is no infiltrate or pleural effusion.      1. Abnormal gas collection to the right of the esophagus at the level of the thoracic inlet with surrounding soft tissue inflammation that extends into the posterior mediastinum and adjacent to the esophagus. There is also soft tissue wall thickening. Findings are suspicious for esophageal perforation with abscess and mediastinal inflammation. 2. No evidence for pulmonary thromboembolic disease.  Findings discussed with Dr. Dickerson in the emergency department 04/11/2023 at 3:30 PM.  Radiation dose reduction techniques were utilized, including automated exposure control and exposure modulation based on body size.         Ordered the above noted radiological studies. Reviewed by me in PACS.            PROCEDURES  Procedures              MEDICATIONS GIVEN IN ER  Medications   sodium chloride 0.9 % flush 10 mL (has no administration in time range)   sodium chloride 0.9 % flush 10 mL (has no administration in time range)   vancomycin 1500 mg/500 mL 0.9% NS IVPB ([facility]) (has no administration in time range)   piperacillin-tazobactam (ZOSYN) 3.375 g in iso-osmotic dextrose 50 ml (premix) (has no administration in time range)   metroNIDAZOLE (FLAGYL) IVPB 500 mg (has no administration in time range)   sodium chloride 0.9 % infusion (has no administration in time range)   aspirin tablet 325 mg (325 mg Oral Given 4/11/23 1202)   iopamidol (ISOVUE-370) 76 % injection 95 mL (95 mL Intravenous Given 4/11/23 1504)                   MEDICAL DECISION MAKING, PROGRESS, and CONSULTS    All labs have been independently reviewed by me.  All radiology studies have been reviewed by me and I have  also reviewed the radiology report.   EKG's independently viewed and interpreted by me.  Discussion below represents my analysis of pertinent findings related to patient's condition, differential diagnosis, treatment plan and final disposition.      Additional sources:  - Discussed/ obtained information from independent historians: Patient's  at bedside    - External (non-ED) record review: N/A    - Chronic or social conditions impacting care: N/A    - Shared decision making: Patient informed of need for admission for further evaluation and management.      Orders placed during this visit:  Orders Placed This Encounter   Procedures   • Blood Culture - Blood,   • Blood Culture - Blood,   • XR Chest 2 View   • CT Angiogram Chest Pulmonary Embolism   • FL Esophagram Complete Single Contrast   • CT Soft Tissue Neck With Contrast   • [place] Draw   • Comprehensive Metabolic Panel   • High Sensitivity Troponin T   • hCG, Serum, Qualitative   • CBC Auto Differential   • High Sensitivity Troponin T 2Hr   • TSH   • T4, Free   • Lactic Acid, Plasma   • NPO Diet NPO Type: Strict NPO   • Undress and Gown   • Cardiac Monitoring   • Continuous Pulse Oximetry   • Inpatient Thoracic Surgery Consult   • [facility] (on-call MD unless specified) Details   • Oxygen Therapy- Nasal Cannula; 2 LPM; Titrate for SPO2: equal to or greater than, 92%   • ECG 12 Lead Chest Pain   • ECG 12 Lead ED Triage Standing Order; Chest Pain   • ECG 12 Lead ED Triage Standing Order; Chest Pain   • ECG 12 Lead Chest Pain   • Insert Peripheral IV   • Insert Peripheral IV   • Inpatient Admission   • CBC & Differential   • Green Top (Gel)   • Lavender Top   • Light Blue Top               Differential diagnosis includes but is not limited to:    Pulmonary embolus  Acute coronary syndrome  Pneumonia        Independent interpretation of labs, radiology studies, and discussions with consultants:  ED Course as of 04/11/23 1620   Tue Apr 11, 2023   1247 HS Troponin  T: <6 [JR]   1247 WBC(!): 13.39 [JR]   1247 Creatinine: 0.71 [JR]   1247 HCG Qualitative: Negative [JR]   1306 Chest x-ray independently interpreted in PACS and demonstrates no infiltrate, normal cardiac silhouette [JR]   1316 EKG          EKG time: 1123  Rhythm/Rate: Sinus rhythm, 77  P waves and ID: Normal  QRS, axis: Normal axis  ST and T waves: T wave inversions V3 through V5    Interpreted Contemporaneously by me, independently viewed  No prior tracing for comparison       [JR]   1317 EKG          EKG time: 1242  Rhythm/Rate: Sinus rhythm, 84  P waves and ID: Normal  QRS, axis: Normal axis  ST and T waves: Nonspecific T wave changes inferiorly, T wave inversions V3 through V5    Interpreted Contemporaneously by me, independently viewed  Similar compared to prior earlier today       [JR]   1435 HS Troponin T: <6 [JR]   1532 I independently interpreted the CT chest images in PACS.  There is no evidence of pulmonary embolus.  There appears to be a collection of air in the right paratracheal soft tissues that does not appear to communicate with the trachea.  There is also significant inflammation of the soft tissue surrounding this.  I discussed these findings with Dr. Castillo, radiologist who expressed concern for possible esophageal perforation. [JR]   1532 I placed a consult to thoracic surgery.  I have ordered blood cultures and lactate.  Patient is not ill-appearing at this time. [JR]   1533 Patient and her  informed of the abnormal CT findings and need for admission for further evaluation.  They are in agreement with plan. [JR]   1555 Discussed with Dr. Borja, [facility], who agrees to admit. [JR]   1616 Discussed with Dr. La, thoracic surgery.  He agrees to consult.  He requested a dedicated CT neck soft tissue with IV contrast to evaluate for possible oropharyngeal abscess that is tracking inferiorly.  He also requested an esophagram and empiric antibiotics including vancomycin, Zosyn, Flagyl. [JR]       ED Course User Index  [JR] Suhail Dickerson MD                 DIAGNOSIS  Final diagnoses:   Paratracheal gas collection         DISPOSITION  Admit            Latest Documented Vital Signs:  As of 16:20 EDT  BP- 105/70 HR- 98 Temp- 99.9 °F (37.7 °C) O2 sat- 97%              --    Please note that portions of this were completed with a voice recognition program.       Note Disclaimer: At [facility], we believe that sharing information builds trust and better relationships. You are receiving this note because you are receiving care at [facility] or recently visited. It is possible you will see health information before a provider has talked with you about it. This kind of information can be easy to misunderstand. To help you fully understand what it means for your health, we urge you to discuss this note with your provider.           Suhail Dickerson MD  04/11/23 2668

## 2023-04-11 NOTE — PROGRESS NOTES
Patient CT scan of the neck and esophagram reviewed.    There is no evidence of esophageal perforation.  She might have a minor cervical esophagus or hypopharyngeal perforation that has sealed.  She has abscess in the right deep cervical space extending into the superior mediastinum.  She has been started antibiotics.  She will need drainage of the deep space abscess.    I discussed with her and her  the indication for incision and drainage.  I am planning EGD and incision and drainage tonight.    Hemanth La MD  Thoracic surgeon

## 2023-04-11 NOTE — CONSULTS
THORACIC SURGERY INPATIENT CONSULT NOTE    REASON FOR CONSULT: Rule out esophageal perforation    REFERRING PROVIDER: Suhail Dickerson MD     Subjective   HISTORY OF PRESENTING ILLNESS:   Wilda Mayo is a 37 y.o. female who does not have significant medical problems.  She is French-speaking and not fluent in English but able to understand and conversant with the help from his .      She was in her usual state of health until yesterday when she started having right chest pain radiating to her neck and scapular region.  She denies precipitating events.  There was no precipitating events.  The pain progressively worsen and prompted her to come to the ER on 4/11/2023.  The pain is exacerbated with coughing and her anterior neck is tender to touch.  She denies fever, chills, rigors.  No prior report of similar pain.  She denies history of neck or chest surgery.  No prior instrumentation or endoscopy intervention.  She denies unintentional weight loss or diagnosis of cancer.  She denies smoking, vaping, drug abuse or alcohol intake in excess.  She works for [employer].  She moved to [place] 6 months ago from [place].  She is originally from [place] and  her  2 years ago who is from [place].  They have 1 year kid who is healthy.  She is otherwise very active and independent in her activities of daily living.  For the last 2 years, she has been getting evaluation for asthma.  He is currently on fluticasone and albuterol inhaler.  She denies reflux symptoms.    In the ER, she was found to have mild leukocytosis and CT of the chest was obtained to rule out pulm embolism.  There was no evidence of pulmonary embolism but she was found to have abnormal gas collection to the right of the esophagus at the level of the thoracic inlet with surrounding soft tissue inflammation which extend into the posterior mediastinum adjacent to the esophagus.  This was concerning for esophageal perforation.  She was  hemodynamically stable and afebrile.  Thoracic surgery was consulted for further evaluation.      Past Medical History:   Diagnosis Date   • Asthma        History reviewed. No pertinent surgical history.    History reviewed. No pertinent family history.    Social History     Socioeconomic History   • Marital status:    Tobacco Use   • Smoking status: Never     Passive exposure: Never   • Smokeless tobacco: Never   Vaping Use   • Vaping Use: Never used   Substance and Sexual Activity   • Alcohol use: Defer   • Drug use: Not Currently   • Sexual activity: Never         Current Facility-Administered Medications:   •  acetaminophen (TYLENOL) tablet 650 mg, 650 mg, Oral, Q4H PRN, Denise Borja MD  •  melatonin tablet 3 mg, 3 mg, Oral, Nightly PRN, Denise Borja MD  •  ondansetron (ZOFRAN) tablet 4 mg, 4 mg, Oral, Q6H PRN **OR** ondansetron (ZOFRAN) injection 4 mg, 4 mg, Intravenous, Q6H PRN, Denise Borja MD  •  sodium chloride 0.9 % flush 10 mL, 10 mL, Intravenous, PRN, Emergency, Triage Protocol, MD  •  [COMPLETED] Insert Peripheral IV, , , Once **AND** sodium chloride 0.9 % flush 10 mL, 10 mL, Intravenous, PRN, Suhail Dickerson MD  •  sodium chloride 0.9 % infusion, 125 mL/hr, Intravenous, Continuous, Suhail Dickerson MD, Last Rate: 125 mL/hr at 04/11/23 1731, 125 mL/hr at 04/11/23 1731  •  vancomycin 1500 mg/500 mL 0.9% NS IVPB ([facility]), 20 mg/kg, Intravenous, Once, Suhail Dickerson MD    Current Outpatient Medications:   •  albuterol (PROVENTIL) (2.5 MG/3ML) 0.083% nebulizer solution, Take 2.5 mg by nebulization Every 4 (Four) Hours As Needed for Wheezing or Shortness of Air., Disp: 90 mL, Rfl: 0  •  albuterol sulfate  (90 Base) MCG/ACT inhaler, Inhale 2 puffs Every 4 (Four) Hours As Needed for Wheezing., Disp: , Rfl:   •  Fluticasone-Salmeterol (ADVAIR/WIXELA) 250-50 MCG/ACT DISKUS, Inhale 2 (Two) Times a Day., Disp: , Rfl:      No Known Allergies       "    Objective    OBJECTIVE:     VITAL SIGNS:  /72   Pulse 101   Temp 99.9 °F (37.7 °C)   Resp 20   Ht 175.3 cm (69.02\")   Wt 70.3 kg (155 lb)   LMP 04/08/2023 (Exact Date)   SpO2 97%   BMI 22.88 kg/m²     PHYSICAL EXAM:  Constitutional:       Appearance: Normal appearance.   HENT:      Head: Normocephalic.      Right Ear: External ear normal.      Left Ear: External ear normal.      Nose: Nose normal.      Mouth/Throat: No obvious deformity     Pharynx: Oropharynx is clear.   Eyes:      Conjunctiva/sclera: Conjunctivae normal.   Cardiovascular:      Rate and Rhythm: Normal rate.      Pulses: Normal pulses.   Pulmonary:      Effort: Pulmonary effort is normal.   Abdominal:      Palpations: Abdomen is soft.   Musculoskeletal:         General: Normal range of motion.      Cervical back: Normal range of motion.   Skin:     General: Skin is warm.   Neurological:      General: No focal deficit present.      Mental Status: He is alert and oriented to person, place, and time.     LAB RESULTS:  I have reviewed all the available laboratory results in the chart.    RESULTS REVIEW:  I have reviewed the patient's all relevant laboratory and imaging findings.     IMAGING RESULTS:    CT chest 4/11/2023:  1. Abnormal gas collection to the right of the esophagus at the level of  the thoracic inlet with surrounding soft tissue inflammation that  extends into the posterior mediastinum and adjacent to the esophagus.  There is also soft tissue wall thickening. Findings are suspicious for  esophageal perforation with abscess and mediastinal inflammation.  2. No evidence for pulmonary thromboembolic disease.    CT neck 4/11/2023:   Pending    Esophagram 4/11/2023:  Pending        ASSESSMENT & PLAN:  Wilda Mayo is a 37 y.o. female with significant medical conditions as mentioned above presented to the hospital with right chest and neck pain.    She is an otherwise active and healthy individual.  She had sudden onset " of right chest pain radiating to the right neck.  There is mild tenderness on examination at the base of the neck near sternal angle.  There is no obvious fluctuation or erythema.  There is no evidence of open wound or incision.  The CT scan findings are unusual but are suspicious for cervical or proximal thoracic esophageal perforation.  The CT chest does not completely show the neck.  I recommended dedicated CT scan of the neck.  I also recommended esophagram as a confirmatory test for esophageal perforation.  I recommend starting her on empiric antibiotics and keeping her NPO till we have complete evaluation.    We will continue to follow her closely.    I discussed the patients findings and my recommendations with the patient. The patient was given adequate time to ask questions and all questions were answered to patient satisfaction. Thank you for this consult and allowing us to participate in the care of your patient.      Hemanth La MD  Thoracic Surgeon  [facility]        Dictated utilizing Dragon dictation    I spent 75 minutes caring for Wilda on this date of service. This time includes time spent by me in the following activities:reviewing tests, obtaining and/or reviewing a separately obtained history, performing a medically appropriate examination and/or evaluation , counseling and educating the patient/family/caregiver, ordering medications, tests, or procedures, referring and communicating with other health care professionals , documenting information in the medical record, independently interpreting results and communicating that information with the patient/family/caregiver, and care coordination and more than half the time was spent in direct face to face evaluation and decision making.

## 2023-04-11 NOTE — ED NOTES
Attempted to call report x2. Notified the unit to call back if they had any questions, pt being put in for transport per ED protocol.

## 2023-04-11 NOTE — LETTER
April 13, 2023     Patient: Wilda Mayo   YOB: 1985   Date of Visit: 4/11/2023       To Whom It May Concern:     Wilda Mayo was under the care of Dr La. Patient is cleared to return to work with a weight limit restriction of 10lb until cleared by surgeon in 2 weeks.           Sincerely, Siri RODRIGUEZ R.N

## 2023-04-11 NOTE — ANESTHESIA PREPROCEDURE EVALUATION
Anesthesia Evaluation     NPO Solid Status: Waived due to emergency             Airway   Mallampati: I  TM distance: >3 FB  Dental          Pulmonary - normal exam   (+) asthma,  (-) sleep apnea, not a smoker    ROS comment: Negative patient screen for BIRD    Cardiovascular     Rhythm: regular  Rate: normal    (-) hypertension      Neuro/Psych  GI/Hepatic/Renal/Endo      Musculoskeletal     Abdominal    Substance History      OB/GYN          Other                      Anesthesia Plan    ASA 2 - emergent     general       Anesthetic plan, risks, benefits, and alternatives have been provided, discussed and informed consent has been obtained with: patient.        CODE STATUS:    Code Status (Patient has no pulse and is not breathing): CPR (Attempt to Resuscitate)  Medical Interventions (Patient has pulse or is breathing): Full

## 2023-04-11 NOTE — ED TRIAGE NOTES
Patient ambulatory to triage w/ reports of cough and chest pain since yesterday. Chest pain increases with cough; seen at [facility] and sent to ER.

## 2023-04-11 NOTE — ED NOTES
"Nursing report ED to floor  Wildasanjuana Mayo  37 y.o.  female    HPI :   Chief Complaint   Patient presents with    Chest Pain    Cough       Admitting doctor:   Denise Borja MD    Admitting diagnosis:   The encounter diagnosis was Paratracheal gas collection.    Code status:   Current Code Status       Date Active Code Status Order ID Comments User Context       4/11/2023 1657 CPR (Attempt to Resuscitate) [id number]  Denise Borja MD ED        Question Answer    Code Status (Patient has no pulse and is not breathing) CPR (Attempt to Resuscitate)    Medical Interventions (Patient has pulse or is breathing) Full                    Allergies:   Patient has no known allergies.    Isolation:   No active isolations    Intake and Output  No intake or output data in the 24 hours ending 04/11/23 1820    Weight:       04/11/23  1553   Weight: 70.3 kg (155 lb)       Most recent vitals:   Vitals:    04/11/23 1231 04/11/23 1431 04/11/23 1553 04/11/23 1601   BP: 101/74 111/79 105/70 107/72   BP Location:   Left arm    Patient Position:   Sitting    Pulse: 81 96 98 101   Resp:   20    Temp:       SpO2: 98% 98% 97% 97%   Weight:   70.3 kg (155 lb)    Height:   175.3 cm (69.02\")        Active LDAs/IV Access:   Lines, Drains & Airways       Active LDAs       Name Placement date Placement time Site Days    Peripheral IV 04/11/23 1321 Right Antecubital 04/11/23  1321  Antecubital  less than 1                    Labs (abnormal labs have a star):   Labs Reviewed   COMPREHENSIVE METABOLIC PANEL - Abnormal; Notable for the following components:       Result Value    Glucose 101 (*)     All other components within normal limits    Narrative:     GFR Normal >60  Chronic Kidney Disease <60  Kidney Failure <15     CBC WITH AUTO DIFFERENTIAL - Abnormal; Notable for the following components:    WBC 13.39 (*)     Hematocrit 48.5 (*)     MCHC 31.3 (*)     Platelets 466 (*)     Lymphocyte % 15.8 (*)     Eosinophil % 7.2 (*)     " Neutrophils, Absolute 9.30 (*)     Eosinophils, Absolute 0.96 (*)     All other components within normal limits   TROPONIN - Normal    Narrative:     High Sensitive Troponin T Reference Range:  <10.0 ng/L- Negative Female for AMI  <15.0 ng/L- Negative Male for AMI  >=10 - Abnormal Female indicating possible myocardial injury.  >=15 - Abnormal Male indicating possible myocardial injury.   Clinicians would have to utilize clinical acumen, EKG, Troponin, and serial changes to determine if it is an Acute Myocardial Infarction or myocardial injury due to an underlying chronic condition.        HCG, SERUM, QUALITATIVE - Normal   TSH - Normal   T4, FREE - Normal    Narrative:     Results may be falsely increased if patient taking Biotin.     LACTIC ACID, PLASMA - Normal   BLOOD CULTURE   BLOOD CULTURE   RAINBOW DRAW    Narrative:     The following orders were created for panel order [place] Draw.  Procedure                               Abnormality         Status                     ---------                               -----------         ------                     Green Top (Gel)[[id number]]                                  Final result               Lavender Top[[id number]]                                     Final result               Gold Top - SST[[id number]]                                                              Light Blue Top[[id number]]                                   Final result                 Please view results for these tests on the individual orders.   HIGH SENSITIVITIY TROPONIN T 2HR    Narrative:     High Sensitive Troponin T Reference Range:  <10.0 ng/L- Negative Female for AMI  <15.0 ng/L- Negative Male for AMI  >=10 - Abnormal Female indicating possible myocardial injury.  >=15 - Abnormal Male indicating possible myocardial injury.   Clinicians would have to utilize clinical acumen, EKG, Troponin, and serial changes to determine if it is an Acute Myocardial Infarction or myocardial injury due to  an underlying chronic condition.        CBC AND DIFFERENTIAL    Narrative:     The following orders were created for panel order CBC & Differential.  Procedure                               Abnormality         Status                     ---------                               -----------         ------                     CBC Auto Differential[[id number]]        Abnormal            Final result                 Please view results for these tests on the individual orders.   GREEN TOP   LAVENDER TOP   LIGHT BLUE TOP       EKG:   ECG 12 Lead Chest Pain   Final Result   HEART RATE= 84  bpm   RR Interval= 714  ms   OK Interval= 141  ms   P Horizontal Axis= 9  deg   P Front Axis= 69  deg   QRSD Interval= 88  ms   QT Interval= 350  ms   QRS Axis= 71  deg   T Wave Axis= -36  deg   - ABNORMAL ECG -   Sinus rhythm   Nonspecific T abnormalities, inferior leads   No change from previous tracing   Electronically Signed By: Favian Farrar ([facility]) 11-Apr-2023 17:38:23   Date and Time of Study: 2023-04-11 12:42:17      ECG 12 Lead Chest Pain   Final Result   HEART RATE= 77  bpm   RR Interval= 779  ms   OK Interval= 146  ms   P Horizontal Axis= -14  deg   P Front Axis= 72  deg   QRSD Interval= 90  ms   QT Interval= 373  ms   QRS Axis= 78  deg   T Wave Axis= -38  deg   - ABNORMAL ECG -   Sinus rhythm   Abnormal T, consider ischemia, anterior leads   No Prior Tracing for Comparison   Electronically Signed By: Favian Farrar ([facility]) 11-Apr-2023 17:38:29   Date and Time of Study: 2023-04-11 11:23:35      ECG 12 Lead ED Triage Standing Order; Chest Pain    (Results Pending)       Meds given in ED:   Medications   sodium chloride 0.9 % flush 10 mL (has no administration in time range)   sodium chloride 0.9 % flush 10 mL (has no administration in time range)   vancomycin 1500 mg/500 mL 0.9% NS IVPB ([facility]) (has no administration in time range)   sodium chloride 0.9 % infusion (125 mL/hr Intravenous New Bag 4/11/23 2535)   acetaminophen  (TYLENOL) tablet 650 mg (has no administration in time range)   ondansetron (ZOFRAN) tablet 4 mg (has no administration in time range)     Or   ondansetron (ZOFRAN) injection 4 mg (has no administration in time range)   melatonin tablet 3 mg (has no administration in time range)   aspirin tablet 325 mg (325 mg Oral Given 4/11/23 1202)   iopamidol (ISOVUE-370) 76 % injection 95 mL (95 mL Intravenous Given 4/11/23 1504)   piperacillin-tazobactam (ZOSYN) 3.375 g in iso-osmotic dextrose 50 ml (premix) (3.375 g Intravenous New Bag 4/11/23 1639)   metroNIDAZOLE (FLAGYL) IVPB 500 mg (500 mg Intravenous New Bag 4/11/23 1740)   iopamidol (ISOVUE-300) 61 % injection 100 mL (93 mL Intravenous Given 4/11/23 1719)       Imaging results:  XR Chest 2 View    Result Date: 4/11/2023  No evidence for acute pulmonary process. Follow-up as clinical indications persist.  This report was finalized on 4/11/2023 1:06 PM by Dr. Marshal Vazquez M.D.      CT Angiogram Chest Pulmonary Embolism    Result Date: 4/11/2023  1. Abnormal gas collection to the right of the esophagus at the level of the thoracic inlet with surrounding soft tissue inflammation that extends into the posterior mediastinum and adjacent to the esophagus. There is also soft tissue wall thickening. Findings are suspicious for esophageal perforation with abscess and mediastinal inflammation. 2. No evidence for pulmonary thromboembolic disease.  Findings discussed with Dr. Dickerson in the emergency department 04/11/2023 at 3:30 PM.  Radiation dose reduction techniques were utilized, including automated exposure control and exposure modulation based on body size.        Ambulatory status:   - with assist    Social issues:   Social History     Socioeconomic History    Marital status:    Tobacco Use    Smoking status: Never     Passive exposure: Never    Smokeless tobacco: Never   Vaping Use    Vaping Use: Never used   Substance and Sexual Activity    Alcohol use: Defer     Drug use: Not Currently    Sexual activity: Never       NIH Stroke Scale:         Yanelis Davis RN  04/11/23 18:20 EDT

## 2023-04-12 ENCOUNTER — APPOINTMENT (OUTPATIENT)
Dept: GENERAL RADIOLOGY | Facility: HOSPITAL | Age: 38
DRG: 981 | End: 2023-04-12
Payer: COMMERCIAL

## 2023-04-12 PROBLEM — J45.909 ASTHMA: Status: ACTIVE | Noted: 2023-04-12

## 2023-04-12 PROBLEM — D17.0 LIPOMA OF NECK: Status: ACTIVE | Noted: 2023-04-12

## 2023-04-12 PROBLEM — J98.51 MEDIASTINITIS: Status: ACTIVE | Noted: 2023-04-12

## 2023-04-12 PROBLEM — T88.7XXA: Status: ACTIVE | Noted: 2023-04-12

## 2023-04-12 PROBLEM — T36.95XA: Status: ACTIVE | Noted: 2023-04-12

## 2023-04-12 LAB
BASOPHILS # BLD AUTO: 0.04 10*3/MM3 (ref 0–0.2)
BASOPHILS NFR BLD AUTO: 0.3 % (ref 0–1.5)
DEPRECATED RDW RBC AUTO: 40 FL (ref 37–54)
EOSINOPHIL # BLD AUTO: 0 10*3/MM3 (ref 0–0.4)
EOSINOPHIL NFR BLD AUTO: 0 % (ref 0.3–6.2)
ERYTHROCYTE [DISTWIDTH] IN BLOOD BY AUTOMATED COUNT: 12 % (ref 12.3–15.4)
HCT VFR BLD AUTO: 37.4 % (ref 34–46.6)
HGB BLD-MCNC: 12.6 G/DL (ref 12–15.9)
IMM GRANULOCYTES # BLD AUTO: 0.08 10*3/MM3 (ref 0–0.05)
IMM GRANULOCYTES NFR BLD AUTO: 0.5 % (ref 0–0.5)
LYMPHOCYTES # BLD AUTO: 1.21 10*3/MM3 (ref 0.7–3.1)
LYMPHOCYTES NFR BLD AUTO: 8.1 % (ref 19.6–45.3)
MCH RBC QN AUTO: 30.8 PG (ref 26.6–33)
MCHC RBC AUTO-ENTMCNC: 33.7 G/DL (ref 31.5–35.7)
MCV RBC AUTO: 91.4 FL (ref 79–97)
MONOCYTES # BLD AUTO: 0.47 10*3/MM3 (ref 0.1–0.9)
MONOCYTES NFR BLD AUTO: 3.1 % (ref 5–12)
NEUTROPHILS NFR BLD AUTO: 13.13 10*3/MM3 (ref 1.7–7)
NEUTROPHILS NFR BLD AUTO: 88 % (ref 42.7–76)
NRBC BLD AUTO-RTO: 0 /100 WBC (ref 0–0.2)
PLATELET # BLD AUTO: 348 10*3/MM3 (ref 140–450)
PMV BLD AUTO: 10.2 FL (ref 6–12)
RBC # BLD AUTO: 4.09 10*6/MM3 (ref 3.77–5.28)
WBC NRBC COR # BLD: 14.93 10*3/MM3 (ref 3.4–10.8)

## 2023-04-12 PROCEDURE — 25010000002 ONDANSETRON PER 1 MG: Performed by: SURGERY

## 2023-04-12 PROCEDURE — 94799 UNLISTED PULMONARY SVC/PX: CPT

## 2023-04-12 PROCEDURE — 85025 COMPLETE CBC W/AUTO DIFF WBC: CPT | Performed by: SURGERY

## 2023-04-12 PROCEDURE — 94640 AIRWAY INHALATION TREATMENT: CPT

## 2023-04-12 PROCEDURE — 99222 1ST HOSP IP/OBS MODERATE 55: CPT | Performed by: INTERNAL MEDICINE

## 2023-04-12 PROCEDURE — G0378 HOSPITAL OBSERVATION PER HR: HCPCS

## 2023-04-12 PROCEDURE — 25010000002 ENOXAPARIN PER 10 MG: Performed by: SURGERY

## 2023-04-12 PROCEDURE — 25010000002 KETOROLAC TROMETHAMINE PER 15 MG: Performed by: SURGERY

## 2023-04-12 PROCEDURE — 25010000002 PIPERACILLIN SOD-TAZOBACTAM PER 1 G: Performed by: SURGERY

## 2023-04-12 PROCEDURE — 99024 POSTOP FOLLOW-UP VISIT: CPT | Performed by: SURGERY

## 2023-04-12 PROCEDURE — 71045 X-RAY EXAM CHEST 1 VIEW: CPT

## 2023-04-12 RX ORDER — HYDROCODONE BITARTRATE AND ACETAMINOPHEN 5; 325 MG/1; MG/1
1 TABLET ORAL EVERY 6 HOURS PRN
Status: DISCONTINUED | OUTPATIENT
Start: 2023-04-12 | End: 2023-04-13 | Stop reason: HOSPADM

## 2023-04-12 RX ORDER — KETOROLAC TROMETHAMINE 15 MG/ML
15 INJECTION, SOLUTION INTRAMUSCULAR; INTRAVENOUS EVERY 8 HOURS PRN
Status: DISCONTINUED | OUTPATIENT
Start: 2023-04-12 | End: 2023-04-13 | Stop reason: HOSPADM

## 2023-04-12 RX ADMIN — TAZOBACTAM SODIUM AND PIPERACILLIN SODIUM 3.38 G: 375; 3 INJECTION, SOLUTION INTRAVENOUS at 03:24

## 2023-04-12 RX ADMIN — TAZOBACTAM SODIUM AND PIPERACILLIN SODIUM 3.38 G: 375; 3 INJECTION, SOLUTION INTRAVENOUS at 11:42

## 2023-04-12 RX ADMIN — KETOROLAC TROMETHAMINE 15 MG: 15 INJECTION, SOLUTION INTRAMUSCULAR; INTRAVENOUS at 08:34

## 2023-04-12 RX ADMIN — TAZOBACTAM SODIUM AND PIPERACILLIN SODIUM 3.38 G: 375; 3 INJECTION, SOLUTION INTRAVENOUS at 20:24

## 2023-04-12 RX ADMIN — BUDESONIDE AND FORMOTEROL FUMARATE DIHYDRATE 2 PUFF: 160; 4.5 AEROSOL RESPIRATORY (INHALATION) at 21:21

## 2023-04-12 RX ADMIN — ACETAMINOPHEN 1000 MG: 500 TABLET, FILM COATED ORAL at 08:28

## 2023-04-12 RX ADMIN — ACETAMINOPHEN 1000 MG: 500 TABLET, FILM COATED ORAL at 20:24

## 2023-04-12 RX ADMIN — ONDANSETRON 4 MG: 2 INJECTION INTRAMUSCULAR; INTRAVENOUS at 00:24

## 2023-04-12 RX ADMIN — DOCUSATE SODIUM 100 MG: 100 CAPSULE, LIQUID FILLED ORAL at 08:28

## 2023-04-12 RX ADMIN — ACETAMINOPHEN 1000 MG: 500 TABLET, FILM COATED ORAL at 00:24

## 2023-04-12 RX ADMIN — POLYETHYLENE GLYCOL 3350 17 G: 17 POWDER, FOR SOLUTION ORAL at 08:28

## 2023-04-12 RX ADMIN — SODIUM CHLORIDE, POTASSIUM CHLORIDE, SODIUM LACTATE AND CALCIUM CHLORIDE 75 ML/HR: 600; 310; 30; 20 INJECTION, SOLUTION INTRAVENOUS at 00:24

## 2023-04-12 RX ADMIN — ENOXAPARIN SODIUM 40 MG: 100 INJECTION SUBCUTANEOUS at 20:24

## 2023-04-12 RX ADMIN — ACETAMINOPHEN 1000 MG: 500 TABLET, FILM COATED ORAL at 16:36

## 2023-04-12 RX ADMIN — KETOROLAC TROMETHAMINE 15 MG: 15 INJECTION, SOLUTION INTRAMUSCULAR; INTRAVENOUS at 00:57

## 2023-04-12 NOTE — PROGRESS NOTES
Name: Wilda Mayo ADMIT: 2023   : 1985  PCP: Provider, No Known    MRN: [id number] LOS: 1 days   AGE/SEX: 37 y.o. female  ROOM: [facility]     Subjective   Subjective   Resting in bed, ice pack overlying neck. NASEEM drain in place. Having some pain, reports dilaudid made her nauseated.        Objective   Objective   Vital Signs  Temp:  [97.7 °F (36.5 °C)-99.4 °F (37.4 °C)] 98 °F (36.7 °C)  Heart Rate:  [] 80  Resp:  [14-16] 16  BP: ()/(53-80) 102/65  SpO2:  [94 %-100 %] 97 %  on  Flow (L/min):  [2-3] 2;   Device (Oxygen Therapy): room air  Body mass index is 22.88 kg/m².  Physical Exam  Constitutional:       General: She is not in acute distress.     Appearance: Normal appearance. She is not ill-appearing.   Neck:      Comments: NASEEM drain in place, ice pack overlying  Cardiovascular:      Rate and Rhythm: Normal rate and regular rhythm.   Pulmonary:      Effort: Pulmonary effort is normal. No respiratory distress.      Breath sounds: Normal breath sounds. No wheezing.   Abdominal:      General: Abdomen is flat. Bowel sounds are normal. There is no distension.      Palpations: Abdomen is soft.   Musculoskeletal:         General: No swelling or tenderness.      Right lower leg: No edema.      Left lower leg: No edema.   Skin:     General: Skin is warm and dry.   Neurological:      General: No focal deficit present.      Mental Status: She is alert and oriented to person, place, and time. Mental status is at baseline.         Results Review     I reviewed the patient's new clinical results.  Results from last 7 days   Lab Units 23  0535 23  1145   WBC 10*3/mm3 14.93* 13.39*   HEMOGLOBIN g/dL 12.6 15.2   PLATELETS 10*3/mm3 348 466*     Results from last 7 days   Lab Units 23  1145   SODIUM mmol/L 140   POTASSIUM mmol/L 4.1   CHLORIDE mmol/L 101   CO2 mmol/L 27.1   BUN mg/dL 9   CREATININE mg/dL 0.71   GLUCOSE mg/dL 101*   Estimated Creatinine Clearance: 122.7 mL/min (by  C-G formula based on SCr of 0.71 mg/dL).  Results from last 7 days   Lab Units 04/11/23  1145   ALBUMIN g/dL 4.7   BILIRUBIN mg/dL 0.5   ALK PHOS U/L 75   AST (SGOT) U/L 12   ALT (SGPT) U/L 13     Results from last 7 days   Lab Units 04/11/23  1145   CALCIUM mg/dL 10.0   ALBUMIN g/dL 4.7     Results from last 7 days   Lab Units 04/11/23  1551   LACTATE mmol/L 0.7     SARS-CoV-2, VINICIUS   Date Value Ref Range Status   08/07/2022 Negative Negative Final     Comment:     Negative: SARS-CoV-2 not detected  Testing did not identify the presence of SARS-CoV-2 (the virus that causes COVID-19) in the patient's sample.  Many factors can impact the sensitivity of this test, including variability in sample collection technique, stage of infection, or the presence of interfering substances. Collection of multiple samples may be necessary to detect the SARS-CoV-2 virus. If   clinically indicated, consider collecting a new sample for COVID-19 testing or testing for other respiratory viruses.  This test was developed for the detection of nucleic acids from the SARS-CoV-2 virus using RT-PCR from upper respiratory specimens.  This test has not been FDA cleared or approved; it is offered as a laboratory developed test (LDT).  The performance characteristics of the assay have been determined by [employer] and validation of the assay meets regulatory standards. Test results could be affected by several different events that are rare but sometimes do occur, and which may affect   detection of the target. These include but are not limited to variation in specimen collection, delays in transportation, and very rarely, laboratory error. In such cases, collection of a new specimen and re-testing may be indicated. These tests meet   requirements for domestic and international travel. All dates listed in this report are in GMT.  To learn more about this test, go to [email or web address]://[email or web address]/pages/stnwv26-xnfkpia  Performed by: BRETT Kelly [id number]  [id number], [address]  [address] [place] [address]  : Jhon Yip, PhD, FACMG, FFSC ([facility])  Performed by: GERSON Kelly [id number], [address] [place] [address], William Martell MD     No results found for: HGBA1C, POCGLU    CT Soft Tissue Neck With Contrast  Addendum: ADDENDUM #2: The possibility of an underlying lesion such as a   malignancy at this site cannot be entirely occluded, and I would   strongly recommend follow-up CT imaging of the neck until complete   resolution of these areas of abnormality. A reasonable interval for the   first follow-up exam would be approximately 3-4 weeks. These findings   and recommendations were directly discussed the hospitalist caring for   this patient, Dr. Xochilt Livingston MD, on 04/12/2023 at approximately   9:00 AM.       This report was finalized on 4/12/2023 9:25 AM by Dr. Alonso Morris M.D.         ADDENDUM: Another differential diagnostic possibility for the infected   appearing collection is an infected paratracheal air cyst. This   additional differential diagnostic consideration was discussed with Dr. La at approximately 8:00 PM.       This report was finalized on 4/11/2023 9:12 PM by Dr. Alonso Morris M.D.  Narrative: CT SCAN OF THE SOFT TISSUES OF THE NECK WITH CONTRAST     CLINICAL HISTORY: Chest pain for a couple of days.     TECHNIQUE: CT scan of the soft tissues of the neck was obtained with 3  mm axial images following the administration of IV contrast. Sagittal  and coronal reconstructed images were obtained.     FINDINGS:     There is a complex collection seen along the right aspect of the  proximal cervical esophagus that contains both air as well as fluid and  soft tissue enhancing foci. This collection extends into the visualized  superior mediastinum where there is inflammation of the fat. The  collection measures up to approximately 3.1 x 2.5 cm in greatest axial  dimensions. The findings are highly  suspicious for an inflammatory  collection secondary to a contained esophageal perforation. This  inflammatory change extends to the level of the innominate artery and  circumscribes the proximal aspect of the right common carotid artery.     The visualized contents of the cranial vault are within normal limits.  The orbits are unremarkable. The parotid glands, submandibular glands,  and sublingual glands are within normal limits. The oral cavity and  tongue are within normal limits. The epiglottis, aryepiglottic folds,  and true vocal cords are unremarkable in appearance. The subglottic  airway as well as proximal trachea are deviated slightly anteriorly into  the left. The visualized lung apices are clear.     Extensive changes of inflammatory paranasal sinus disease are seen with  complete opacification of the visualized frontal sinus as well as  ethmoid air cells. There is subtotal opacification of the maxillary  sinuses with mucosal thickening as well as air-fluid levels. Air-fluid  levels are additionally seen within the sphenoid sinus.     Impression:    There is a complex collection seen along the right aspect of the  posterior cervical esophagus that contains air, fluid as well as  enhancing soft tissue foci. This collection extends into the level of  the visualized superior mediastinum where there is inflammation of the  fat. Again, the collection measures up to 3.1 x 2.5 cm in greatest axial  dimensions and extends to the innominate artery and circumscribes the  proximal aspect of the right common carotid artery. The findings are  highly suspicious for an inflammatory collection secondary to a  contained esophageal perforation.     Extensive changes of inflammatory paranasal sinus disease are  incidentally noted as discussed in detail above.     These findings were discussed with Dr. Hemanth La on 04/11/2023 at  approximately 7:10 PM.     Radiation dose reduction techniques were utilized, including  automated  exposure control and exposure modulation based on body size.     This report was finalized on 4/11/2023 7:28 PM by Dr. Alonso Morris M.D.     XR Chest 1 View  Narrative: PORTABLE CHEST X-RAY     HISTORY: Postop neck exploration and mass excision yesterday.     Portable chest x-ray is provided. Correlation: Chest x-ray and CT scans  04/11/2023.     FINDINGS: There is a soft tissue drain in the upper right  mediastinum/neck base. The cardiomediastinal silhouette is normal. The  lungs are clear. The costophrenic sulci are dry and the bones appear  normal. There is no pneumothorax.     Impression: Negative.     This report was finalized on 4/12/2023 7:30 AM by Dr. Chin Baron M.D.       Scheduled Medications  acetaminophen, 1,000 mg, Oral, TID  budesonide-formoterol, 2 puff, Inhalation, BID - RT  docusate sodium, 100 mg, Oral, BID  enoxaparin, 40 mg, Subcutaneous, Nightly  piperacillin-tazobactam, 3.375 g, Intravenous, Q8H  polyethylene glycol, 17 g, Oral, Daily    Infusions  lactated ringers, 9 mL/hr, Last Rate: 9 mL/hr (04/11/23 2002)  lactated ringers, 75 mL/hr, Last Rate: 75 mL/hr (04/12/23 0024)  sodium chloride, 125 mL/hr, Last Rate: 125 mL/hr (04/11/23 1731)    Diet  Diet: Regular/House Diet; Texture: Soft to Chew (NDD 3); Soft to Chew: Whole Meat; Fluid Consistency: Thin (IDDSI 0)         I have personally reviewed:  [x]  Laboratory   []  Microbiology   [x]  Radiology   [x]  EKG/Telemetry   []  Cardiology/Vascular   []  Pathology   []  Records    Assessment/Plan     Active Hospital Problems    Diagnosis  POA   • **Paratracheal gas collection [R93.89]  Yes   • Mediastinitis [J98.51]  Unknown   • Lipoma of neck [D17.0]  Unknown   • 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       37 y.o. female admitted with Paratracheal gas collection.    Superior  mediastinitis  Subclinical hypopharyngeal perforation?  Neck lipoma  - CTS took pt to OR 4/11 for right neck exploration and excision of para-carotid neck mass   - continue on zosyn, consult ID, hopefully transition to oral antibiotics tomorrow and DC  - d/w radiologist Dr. Morris, recommend short term CT in 3-4 weeks for follow up/resolution; follow up with Dr. 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  · Full code.  · Discussed with patient and nursing staff.  · Anticipate discharge home tomorrow.      Xochilt Livingston MD  [facility]  04/12/23  17:11 EDT    I wore protective equipment throughout this patient encounter including gloves and protective eyewear.  Hand hygiene was performed before donning protective equipment and after removal when leaving the room.    Expected Discharge Date and Time     Expected Discharge Date Expected Discharge Time    Apr 14, 2023

## 2023-04-12 NOTE — PAYOR COMM NOTE
"Wilda Dunn (37 y.o. Female)     PLEASE SEE ATTACHED FOR INPT AUTH     REF # [id number]    PLEASE CALL GINNY PATEL RN/ DEPT @ [phone]  OR FAX  DEPARTMENT @  [phone]    THANK YOU   GINNY PATEL RN  [facility]         Date of Birth   1985    Social Security Number       Address   [address] [address]    Home Phone       MRN   [id number]       [place]       Marital Status                               Admission Date   23    Admission Type   Emergency    Admitting Provider   Denise Borja MD    Attending Provider   Xochilt Livingston MD    Department, Room/Bed   [facility] 5 [facility], E559/1       Discharge Date       Discharge Disposition       Discharge Destination                               Attending Provider: Xochilt Livingston MD    Allergies: Vancomycin    Isolation: None   Infection: None   Code Status: CPR    Ht: 175.3 cm (69.02\")   Wt: 70.3 kg (155 lb)    Admission Cmt: None   Principal Problem: Paratracheal gas collection [R93.89]                 Active Insurance as of 2023     Primary Coverage     Payor Plan Insurance Group Employer/Plan Group    [employer] H[employer] [id number]     Payor Plan Address Payor Plan Phone Number Payor Plan Fax Number Effective Dates    [address] [phone]  2023 - None Entered    [place] [address]       Subscriber Name Subscriber Birth Date Member ID       WILDA DUNN 1985 [id number]                 Emergency Contacts      (Rel.) Home Phone Work Phone Mobile Phone    ROSCOE VERDUZCO (Spouse) -- -- [phone]            [place]: [facility] [id number]  Tax ID [id number]     History & Physical      Denise Borja MD at [phone]          HISTORY AND PHYSICAL   [facility]        Date of Admission: 2023  Patient Identification:  Name: Wilda Mayo  Age: 37 y.o.  Sex: female  :  " 1985  MRN: [id number]                     Primary Care Physician: Provider, No Known    Chief Complaint:  37 year old female who presented to the emergency room with chest pain which started two days ago; the pain is on the right side; she denies shortness of breath; no fever or chills; the pain radiates to her right neck and shoulder; she has a history of asthma and is a nonsmoker; she has had a nonproductive cough    History of Present Illness:   As above    Past Medical History:  Past Medical History:   Diagnosis Date   • Asthma      Past Surgical History:  History reviewed. No pertinent surgical history.   Home Meds:  Medications Prior to Admission   Medication Sig Dispense Refill Last Dose   • albuterol (PROVENTIL) (2.5 MG/3ML) 0.083% nebulizer solution Take 2.5 mg by nebulization Every 4 (Four) Hours As Needed for Wheezing or Shortness of Air. 90 mL 0 Past Month   • albuterol sulfate  (90 Base) MCG/ACT inhaler Inhale 2 puffs Every 4 (Four) Hours As Needed for Wheezing.   Past Month   • Fluticasone-Salmeterol (ADVAIR/WIXELA) 250-50 MCG/ACT DISKUS Inhale 2 (Two) Times a Day.   Past Month       Allergies:  No Known Allergies  Immunizations:    There is no immunization history on file for this patient.  Social History:   Social History     Social History Narrative   • Not on file     Social History     Socioeconomic History   • Marital status:    Tobacco Use   • Smoking status: Never     Passive exposure: Never   • Smokeless tobacco: Never   Vaping Use   • Vaping Use: Never used   Substance and Sexual Activity   • Alcohol use: Defer   • Drug use: Not Currently   • Sexual activity: Never       Family History:  History reviewed. No pertinent family history.     Review of Systems  See history of present illness and past medical history.  Patient denies headache, dizziness, syncope, falls, trauma, change in vision, change in hearing, change in taste, changes in weight, changes in appetite, focal  "weakness, numbness, or paresthesia.  Patient denies  palpitations, dyspnea, orthopnea, PND sinus pressure, rhinorrhea, epistaxis, hemoptysis, nausea, vomiting,hematemesis, diarrhea, constipation or hematchezia.  Denies cold or heat intolerance, polydipsia, polyuria, polyphagia. Denies hematuria, pyuria, dysuria, hesitancy, frequency or urgency. Denies consumption of raw and under cooked meats foods or change in water source.  Denies fever, chills, sweats, night sweats.  Denies missing any routine medications. Remainder of ROS is negative.    Objective:  T Max 24 hrs: Temp (24hrs), Av.3 °F (37.4 °C), Min:98.7 °F (37.1 °C), Max:99.9 °F (37.7 °C)    Vitals Ranges:   Temp:  [98.7 °F (37.1 °C)-99.9 °F (37.7 °C)] 99.9 °F (37.7 °C)  Heart Rate:  [] 110  Resp:  [16-20] 16  BP: (101-111)/(70-80) 108/80      Exam:  /80   Pulse 110   Temp 99.9 °F (37.7 °C)   Resp 16   Ht 175.3 cm (69.02\")   Wt 70.3 kg (155 lb)   LMP 2023 (Exact Date)   SpO2 96%   BMI 22.88 kg/m²     General Appearance:    Alert, cooperative, no distress, appears stated age   Head:    Normocephalic, without obvious abnormality, atraumatic   Eyes:    PERRL, conjunctivae/corneas clear, EOM's intact, both eyes   Ears:    Normal external ear canals, both ears   Nose:   Nares normal, septum midline, mucosa normal, no drainage    or sinus tenderness   Throat:   Lips, mucosa, and tongue normal   Neck:   Supple, symmetrical, trachea midline, no adenopathy;     thyroid:  no enlargement/tenderness/nodules; no carotid    bruit or JVD   Back:     Symmetric, no curvature, ROM normal, no CVA tenderness   Lungs:     Clear to auscultation bilaterally, respirations unlabored   Chest Wall:    No tenderness or deformity    Heart:    Regular rate and rhythm, S1 and S2 normal, no murmur, rub   or gallop   Abdomen:     Soft, nontender, bowel sounds active all four quadrants,     no masses, no hepatomegaly, no splenomegaly   Extremities:   Extremities " normal, atraumatic, no cyanosis or edema   Pulses:   2+ and symmetric all extremities   Skin:   Skin color, texture, turgor normal, no rashes or lesions               .    Data Review:  Labs in chart were reviewed.  WBC   Date Value Ref Range Status   04/11/2023 13.39 (H) 3.40 - 10.80 10*3/mm3 Final     Hemoglobin   Date Value Ref Range Status   04/11/2023 15.2 12.0 - 15.9 g/dL Final     Hematocrit   Date Value Ref Range Status   04/11/2023 48.5 (H) 34.0 - 46.6 % Final     Platelets   Date Value Ref Range Status   04/11/2023 466 (H) 140 - 450 10*3/mm3 Final     Sodium   Date Value Ref Range Status   04/11/2023 140 136 - 145 mmol/L Final     Potassium   Date Value Ref Range Status   04/11/2023 4.1 3.5 - 5.2 mmol/L Final     Chloride   Date Value Ref Range Status   04/11/2023 101 98 - 107 mmol/L Final     CO2   Date Value Ref Range Status   04/11/2023 27.1 22.0 - 29.0 mmol/L Final     BUN   Date Value Ref Range Status   04/11/2023 9 6 - 20 mg/dL Final     Creatinine   Date Value Ref Range Status   04/11/2023 0.71 0.57 - 1.00 mg/dL Final     Glucose   Date Value Ref Range Status   04/11/2023 101 (H) 65 - 99 mg/dL Final     Calcium   Date Value Ref Range Status   04/11/2023 10.0 8.6 - 10.5 mg/dL Final       Results from last 7 days   Lab Units 04/11/23  1145   TSH uIU/mL 1.860   FREE T4 ng/dL 1.06          Imaging Results (All)     Procedure Component Value Units Date/Time    CT Angiogram Chest Pulmonary Embolism [[id number]] Collected: 04/11/23 1536     Updated: 04/11/23 2025    Addenda:        ADDENDUM: Given the normal appearance of the esophagram, an alternative  diagnosis in this patient includes an infected paratracheal air cyst and  that should also be considered in the differential diagnosis.     This report was finalized on 4/11/2023 8:22 PM by Dr. Hammad Castillo M.D.     Signed: 04/11/23 2022 by Hammad Castillo MD    Narrative:      CT ANGIOGRAM CHEST WITH IV CONTRAST     HISTORY: 47-year-old female with  chest pain and cough. Pain is localized  to the right upper chest and right scapular region.     TECHNIQUE: CT angiogram chest includes axial imaging from the thoracic  inlet to the upper abdomen with IV contrast and data reconstructed in  coronal and sagittal planes and 3-dimensional volume rendering was  performed.     FINDINGS: Beginning posterior to the lower pole of the right thyroid  gland and adjacent to the right aspect of the esophagus and posterior to  the right aspect of the trachea there is abnormal air that measures  approximately 2.1 x 1.9 cm. This is centered at the level of the  thoracic inlet and there is surrounding inflammation with soft tissue  thickening and stranding within the fat. The trachea is displaced  anteriorly and to the left and the lower pole of the thyroid gland is  displaced anteriorly. There is stranding/inflammation within the  posterior mediastinal fat that also extends caudally posterior to the  trachea and into the subcarinal region. There appears to be esophageal  wall thickening.     There is no intrapulmonary arterial filling defect to diagnose a  pulmonary embolus. Lungs appear clear and there is no infiltrate or  pleural effusion.       Impression:      1. Abnormal gas collection to the right of the esophagus at the level of  the thoracic inlet with surrounding soft tissue inflammation that  extends into the posterior mediastinum and adjacent to the esophagus.  There is also soft tissue wall thickening. Findings are suspicious for  esophageal perforation with abscess and mediastinal inflammation.  2. No evidence for pulmonary thromboembolic disease.     Findings discussed with Dr. Dickerson in the emergency department  04/11/2023 at 3:30 PM.      Radiation dose reduction techniques were utilized, including automated  exposure control and exposure modulation based on body size.     This report was finalized on 4/11/2023 7:35 PM by Dr. Hammad Castillo M.D.       FL  Esophagram Complete Single Contrast [[id number]] Collected: 04/11/23 1832     Updated: 04/11/23 1938    Narrative:      SINGLE CONTRAST GASTROGRAFIN SWALLOWING EXAM     HISTORY: 37-year-old female with chest pain and abnormal paraesophageal  gas at the level of the thoracic inlet on CT.     TECHNIQUE AND FINDINGS: Initially, the patient swallowed Gastrografin in  an upright position in various projections. Exam demonstrates the right  paraesophageal fluid collection adjacent to the esophagus at the level  of the thoracic inlet. There is no extension of contrast into this  collection. The esophagus exhibits normal configuration and there is no  delay in passage of contrast or esophageal mucosal abnormality or  mechanical obstruction. Patient was placed in an VELAZCO and drank more  water-soluble contrast material and there is no evidence for  extravasation of contrast from the esophagus.       Impression:      There is no evidence for esophageal leak at this time. No  water-soluble contrast material was demonstrated extending into the  abnormal right paraesophageal air collection at the level of the  thoracic inlet.     FLUOROSCOPY TIME: 2 minutes 52 seconds, 98 images.     This report was finalized on 4/11/2023 7:34 PM by Dr. Hammad Castillo M.D.       CT Soft Tissue Neck With Contrast [[id number]] Collected: 04/11/23 1855     Updated: 04/11/23 1931    Narrative:      CT SCAN OF THE SOFT TISSUES OF THE NECK WITH CONTRAST     CLINICAL HISTORY: Chest pain for a couple of days.     TECHNIQUE: CT scan of the soft tissues of the neck was obtained with 3  mm axial images following the administration of IV contrast. Sagittal  and coronal reconstructed images were obtained.     FINDINGS:     There is a complex collection seen along the right aspect of the  proximal cervical esophagus that contains both air as well as fluid and  soft tissue enhancing foci. This collection extends into the visualized  superior mediastinum where  there is inflammation of the fat. The  collection measures up to approximately 3.1 x 2.5 cm in greatest axial  dimensions. The findings are highly suspicious for an inflammatory  collection secondary to a contained esophageal perforation. This  inflammatory change extends to the level of the innominate artery and  circumscribes the proximal aspect of the right common carotid artery.     The visualized contents of the cranial vault are within normal limits.  The orbits are unremarkable. The parotid glands, submandibular glands,  and sublingual glands are within normal limits. The oral cavity and  tongue are within normal limits. The epiglottis, aryepiglottic folds,  and true vocal cords are unremarkable in appearance. The subglottic  airway as well as proximal trachea are deviated slightly anteriorly into  the left. The visualized lung apices are clear.     Extensive changes of inflammatory paranasal sinus disease are seen with  complete opacification of the visualized frontal sinus as well as  ethmoid air cells. There is subtotal opacification of the maxillary  sinuses with mucosal thickening as well as air-fluid levels. Air-fluid  levels are additionally seen within the sphenoid sinus.       Impression:         There is a complex collection seen along the right aspect of the  posterior cervical esophagus that contains air, fluid as well as  enhancing soft tissue foci. This collection extends into the level of  the visualized superior mediastinum where there is inflammation of the  fat. Again, the collection measures up to 3.1 x 2.5 cm in greatest axial  dimensions and extends to the innominate artery and circumscribes the  proximal aspect of the right common carotid artery. The findings are  highly suspicious for an inflammatory collection secondary to a  contained esophageal perforation.     Extensive changes of inflammatory paranasal sinus disease are  incidentally noted as discussed in detail above.     These  findings were discussed with Dr. Hemanth La on 04/11/2023 at  approximately 7:10 PM.     Radiation dose reduction techniques were utilized, including automated  exposure control and exposure modulation based on body size.     This report was finalized on 4/11/2023 7:28 PM by Dr. Alonso Morris M.D.       XR Chest 2 View [[id number]] Collected: 04/11/23 1306     Updated: 04/11/23 1309    Narrative:      XR CHEST 2 VW-     HISTORY: Female who is 37 years-old,  chest pain     TECHNIQUE: Frontal and lateral views of the chest     COMPARISON: None available     FINDINGS: Heart, mediastinum and pulmonary vasculature are unremarkable.  No focal pulmonary consolidation, pleural effusion, or pneumothorax. No  acute osseous process.       Impression:      No evidence for acute pulmonary process. Follow-up as  clinical indications persist.     This report was finalized on 4/11/2023 1:06 PM by Dr. Marshal Vazquez M.D.               Assessment:  Active Hospital Problems    Diagnosis  POA   • **Paratracheal gas collection [R93.89]  Yes      Resolved Hospital Problems   No resolved problems to display.   chest pain  paraesophageal abscess  asthma    Plan:  Patient has been taken to the OR for drainage of an abscess  No extravasation seen on the esophagram  Trend labs  Prn mininebs  Continue zosyn  D.w patient and ED provider  Thoracic surgery input noted and appreciated    Denise Borja MD  4/11/2023  20:39 EDT      Electronically signed by Denise Borja MD at 04/11/23 2045          Emergency Department Notes      Bee Zavala RN at 04/11/23 1121        Patient ambulatory to triage w/ reports of cough and chest pain since yesterday. Chest pain increases with cough[facility]     Electronically signed by Bee Zavala RN at 04/11/23 1136     Suhail Dickerson MD at 04/11/23 1319           EMERGENCY DEPARTMENT ENCOUNTER    Room Number:  35/35  Date seen:  4/11/2023  PCP:  Provider, No Known  Historian: Patient      HPI:  Chief Complaint: Chest pain  A complete HPI/ROS/PMH/PSH/SH/FH are unobtainable due to: Nothing  Context: Wilda Mayo is a 37 y.o. female who presents to the ED c/o chest pain for a couple of days.  Patient reports the pain is localized to the right upper chest, right scapular region, and also her neck.  The pain is worse with coughing.  She also reports that her anterior neck is tender to touch.  She denies fever or chills.  She denies shortness of breath.  She does have a history of asthma.  She denies smoking.  No history of hypertension, hyperlipidemia, diabetes.  She takes no birth control or estrogen supplement.  She denies recent prolonged car ride or immobilization.  She did drive to [place] from [place] but that was 5 months ago.  She denies history of DVT or PE.  No history of coronary disease.  She has never had an EKG before to her knowledge.  She denies history of thyroid disorder.            PAST MEDICAL HISTORY  Active Ambulatory Problems     Diagnosis Date Noted   • No Active Ambulatory Problems     Resolved Ambulatory Problems     Diagnosis Date Noted   • No Resolved Ambulatory Problems     Past Medical History:   Diagnosis Date   • Asthma          PAST SURGICAL HISTORY  History reviewed. No pertinent surgical history.      FAMILY HISTORY  History reviewed. No pertinent family history.      SOCIAL HISTORY  Social History     Socioeconomic History   • Marital status:    Tobacco Use   • Smoking status: Never     Passive exposure: Never   • Smokeless tobacco: Never   Vaping Use   • Vaping Use: Never used   Substance and Sexual Activity   • Alcohol use: Defer   • Drug use: Not Currently   • Sexual activity: Never         ALLERGIES  Patient has no known allergies.        REVIEW OF SYSTEMS  Review of Systems   Review of all 14 systems is negative other than stated in the HPI above.      PHYSICAL EXAM  ED Triage Vitals   Temp Heart Rate Resp BP  SpO2   04/11/23 1110 04/11/23 1110 04/11/23 1110 04/11/23 1130 04/11/23 1110   99.9 °F (37.7 °C) 88 16 107/75 99 %      Temp src Heart Rate Source Patient Position BP Location FiO2 (%)   -- -- 04/11/23 1130 04/11/23 1130 --     Sitting Left arm        Physical Exam      GENERAL: Awake and alert, no acute distress  HENT: nares patent, oropharynx clear, neck supple, no thyromegaly, no cervical adenopathy, there is anterior neck tenderness without erythema or warmth, maxillary central incisors are broken  EYES: no scleral icterus,, pupils 3 mm reactive bilaterally, EOMI  CV: regular rhythm, normal rate  RESPIRATORY: normal effort  ABDOMEN: soft, nondistended, nontender throughout  MUSCULOSKELETAL: no deformity, no calf tenderness present bilaterally, no peripheral edema  NEURO: alert, moves all extremities, follows commands, cranial nerves II through XII grossly intact with speech fluent and clear  PSYCH:  calm, cooperative  SKIN: warm, dry    Vital signs and nursing notes reviewed.          LAB RESULTS  Recent Results (from the past 24 hour(s))   ECG 12 Lead Chest Pain    Collection Time: 04/11/23 11:23 AM   Result Value Ref Range    QT Interval 373 ms   Comprehensive Metabolic Panel    Collection Time: 04/11/23 11:45 AM    Specimen: Blood   Result Value Ref Range    Glucose 101 (H) 65 - 99 mg/dL    BUN 9 6 - 20 mg/dL    Creatinine 0.71 0.57 - 1.00 mg/dL    Sodium 140 136 - 145 mmol/L    Potassium 4.1 3.5 - 5.2 mmol/L    Chloride 101 98 - 107 mmol/L    CO2 27.1 22.0 - 29.0 mmol/L    Calcium 10.0 8.6 - 10.5 mg/dL    Total Protein 7.4 6.0 - 8.5 g/dL    Albumin 4.7 3.5 - 5.2 g/dL    ALT (SGPT) 13 1 - 33 U/L    AST (SGOT) 12 1 - 32 U/L    Alkaline Phosphatase 75 39 - 117 U/L    Total Bilirubin 0.5 0.0 - 1.2 mg/dL    Globulin 2.7 gm/dL    A/G Ratio 1.7 g/dL    BUN/Creatinine Ratio 12.7 7.0 - 25.0    Anion Gap 11.9 5.0 - 15.0 mmol/L    eGFR 113.9 >60.0 mL/min/1.73   High Sensitivity Troponin T    Collection Time: 04/11/23  11:45 AM    Specimen: Blood   Result Value Ref Range    HS Troponin T <6 <10 ng/L   hCG, Serum, Qualitative    Collection Time: 04/11/23 11:45 AM    Specimen: Blood   Result Value Ref Range    HCG Qualitative Negative Negative   Green Top (Gel)    Collection Time: 04/11/23 11:45 AM   Result Value Ref Range    Extra Tube Hold for add-ons.    Lavender Top    Collection Time: 04/11/23 11:45 AM   Result Value Ref Range    Extra Tube hold for add-on    Light Blue Top    Collection Time: 04/11/23 11:45 AM   Result Value Ref Range    Extra Tube Hold for add-ons.    CBC Auto Differential    Collection Time: 04/11/23 11:45 AM    Specimen: Blood   Result Value Ref Range    WBC 13.39 (H) 3.40 - 10.80 10*3/mm3    RBC 5.21 3.77 - 5.28 10*6/mm3    Hemoglobin 15.2 12.0 - 15.9 g/dL    Hematocrit 48.5 (H) 34.0 - 46.6 %    MCV 93.1 79.0 - 97.0 fL    MCH 29.2 26.6 - 33.0 pg    MCHC 31.3 (L) 31.5 - 35.7 g/dL    RDW 12.4 12.3 - 15.4 %    RDW-SD 42.5 37.0 - 54.0 fl    MPV 9.8 6.0 - 12.0 fL    Platelets 466 (H) 140 - 450 10*3/mm3    Neutrophil % 69.4 42.7 - 76.0 %    Lymphocyte % 15.8 (L) 19.6 - 45.3 %    Monocyte % 6.6 5.0 - 12.0 %    Eosinophil % 7.2 (H) 0.3 - 6.2 %    Basophil % 0.7 0.0 - 1.5 %    Immature Grans % 0.3 0.0 - 0.5 %    Neutrophils, Absolute 9.30 (H) 1.70 - 7.00 10*3/mm3    Lymphocytes, Absolute 2.11 0.70 - 3.10 10*3/mm3    Monocytes, Absolute 0.89 0.10 - 0.90 10*3/mm3    Eosinophils, Absolute 0.96 (H) 0.00 - 0.40 10*3/mm3    Basophils, Absolute 0.09 0.00 - 0.20 10*3/mm3    Immature Grans, Absolute 0.04 0.00 - 0.05 10*3/mm3    nRBC 0.0 0.0 - 0.2 /100 WBC   TSH    Collection Time: 04/11/23 11:45 AM    Specimen: Blood   Result Value Ref Range    TSH 1.860 0.270 - 4.200 uIU/mL   T4, Free    Collection Time: 04/11/23 11:45 AM    Specimen: Blood   Result Value Ref Range    Free T4 1.06 0.93 - 1.70 ng/dL   ECG 12 Lead Chest Pain    Collection Time: 04/11/23 12:42 PM   Result Value Ref Range    QT Interval 350 ms   High  Sensitivity Troponin T 2Hr    Collection Time: 04/11/23  1:22 PM    Specimen: Arm, Right; Blood   Result Value Ref Range    HS Troponin T <6 <10 ng/L    Troponin T Delta     Lactic Acid, Plasma    Collection Time: 04/11/23  3:51 PM    Specimen: Blood   Result Value Ref Range    Lactate 0.7 0.5 - 2.0 mmol/L       Ordered the above labs and reviewed the results.        RADIOLOGY  XR Chest 2 View    Result Date: 4/11/2023  XR CHEST 2 VW-  HISTORY: Female who is 37 years-old,  chest pain  TECHNIQUE: Frontal and lateral views of the chest  COMPARISON: None available  FINDINGS: Heart, mediastinum and pulmonary vasculature are unremarkable. No focal pulmonary consolidation, pleural effusion, or pneumothorax. No acute osseous process.      No evidence for acute pulmonary process. Follow-up as clinical indications persist.  This report was finalized on 4/11/2023 1:06 PM by Dr. Marshal Vazquez M.D.      CT Angiogram Chest Pulmonary Embolism    Result Date: 4/11/2023  CT ANGIOGRAM CHEST WITH IV CONTRAST  HISTORY: 47-year-old female with chest pain and cough. Pain is localized to the right upper chest and right scapular region.  TECHNIQUE: CT angiogram chest includes axial imaging from the thoracic inlet to the upper abdomen with IV contrast and data reconstructed in coronal and sagittal planes and 3-dimensional volume rendering was performed.  FINDINGS: Beginning posterior to the lower pole of the right thyroid gland and adjacent to the right aspect of the esophagus and posterior to the right aspect of the trachea there is abnormal air that measures approximately 2.1 x 1.9 cm. This is centered at the level of the thoracic inlet and there is surrounding inflammation with soft tissue thickening and stranding within the fat. The trachea is displaced anteriorly and to the left and the lower pole of the thyroid gland is displaced anteriorly. There is stranding/inflammation within the posterior mediastinal fat that also extends  caudally posterior to the trachea and into the subcarinal region. There appears to be esophageal wall thickening.  There is no intrapulmonary arterial filling defect to diagnose a pulmonary embolus. Lungs appear clear and there is no infiltrate or pleural effusion.      1. Abnormal gas collection to the right of the esophagus at the level of the thoracic inlet with surrounding soft tissue inflammation that extends into the posterior mediastinum and adjacent to the esophagus. There is also soft tissue wall thickening. Findings are suspicious for esophageal perforation with abscess and mediastinal inflammation. 2. No evidence for pulmonary thromboembolic disease.  Findings discussed with Dr. Dickerson in the emergency department 04/11/2023 at 3:30 PM.  Radiation dose reduction techniques were utilized, including automated exposure control and exposure modulation based on body size.         Ordered the above noted radiological studies. Reviewed by me in PACS.            PROCEDURES  Procedures              MEDICATIONS GIVEN IN ER  Medications   sodium chloride 0.9 % flush 10 mL (has no administration in time range)   sodium chloride 0.9 % flush 10 mL (has no administration in time range)   vancomycin 1500 mg/500 mL 0.9% NS IVPB ([facility]) (has no administration in time range)   piperacillin-tazobactam (ZOSYN) 3.375 g in iso-osmotic dextrose 50 ml (premix) (has no administration in time range)   metroNIDAZOLE (FLAGYL) IVPB 500 mg (has no administration in time range)   sodium chloride 0.9 % infusion (has no administration in time range)   aspirin tablet 325 mg (325 mg Oral Given 4/11/23 1202)   iopamidol (ISOVUE-370) 76 % injection 95 mL (95 mL Intravenous Given 4/11/23 1504)                   MEDICAL DECISION MAKING, PROGRESS, and CONSULTS    All labs have been independently reviewed by me.  All radiology studies have been reviewed by me and I have also reviewed the radiology report.   EKG's independently viewed and  interpreted by me.  Discussion below represents my analysis of pertinent findings related to patient's condition, differential diagnosis, treatment plan and final disposition.      Additional sources:  - Discussed/ obtained information from independent historians: Patient's  at bedside    - External (non-ED) record review: N/A    - Chronic or social conditions impacting care: N/A    - Shared decision making: Patient informed of need for admission for further evaluation and management.      Orders placed during this visit:  Orders Placed This Encounter   Procedures   • Blood Culture - Blood,   • Blood Culture - Blood,   • XR Chest 2 View   • CT Angiogram Chest Pulmonary Embolism   • FL Esophagram Complete Single Contrast   • CT Soft Tissue Neck With Contrast   • [place] Draw   • Comprehensive Metabolic Panel   • High Sensitivity Troponin T   • hCG, Serum, Qualitative   • CBC Auto Differential   • High Sensitivity Troponin T 2Hr   • TSH   • T4, Free   • Lactic Acid, Plasma   • NPO Diet NPO Type: Strict NPO   • Undress and Gown   • Cardiac Monitoring   • Continuous Pulse Oximetry   • Inpatient Thoracic Surgery Consult   • [facility] (on-call MD unless specified) Details   • Oxygen Therapy- Nasal Cannula; 2 LPM; Titrate for SPO2: equal to or greater than, 92%   • ECG 12 Lead Chest Pain   • ECG 12 Lead ED Triage Standing Order; Chest Pain   • ECG 12 Lead ED Triage Standing Order; Chest Pain   • ECG 12 Lead Chest Pain   • Insert Peripheral IV   • Insert Peripheral IV   • Inpatient Admission   • CBC & Differential   • Green Top (Gel)   • Lavender Top   • Light Blue Top               Differential diagnosis includes but is not limited to:    Pulmonary embolus  Acute coronary syndrome  Pneumonia        Independent interpretation of labs, radiology studies, and discussions with consultants:  ED Course as of 04/11/23 1620   Tue Apr 11, 2023   1247 HS Troponin T: <6 [JR]   1247 WBC(!): 13.39 [JR]   1247 Creatinine: 0.71 [JR]    1247 HCG Qualitative: Negative [JR]   1306 Chest x-ray independently interpreted in PACS and demonstrates no infiltrate, normal cardiac silhouette [JR]   1316 EKG          EKG time: 1123  Rhythm/Rate: Sinus rhythm, 77  P waves and AL: Normal  QRS, axis: Normal axis  ST and T waves: T wave inversions V3 through V5    Interpreted Contemporaneously by me, independently viewed  No prior tracing for comparison       [JR]   1317 EKG          EKG time: 1242  Rhythm/Rate: Sinus rhythm, 84  P waves and AL: Normal  QRS, axis: Normal axis  ST and T waves: Nonspecific T wave changes inferiorly, T wave inversions V3 through V5    Interpreted Contemporaneously by me, independently viewed  Similar compared to prior earlier today       [JR]   1435 HS Troponin T: <6 [JR]   1532 I independently interpreted the CT chest images in PACS.  There is no evidence of pulmonary embolus.  There appears to be a collection of air in the right paratracheal soft tissues that does not appear to communicate with the trachea.  There is also significant inflammation of the soft tissue surrounding this.  I discussed these findings with Dr. Castillo, radiologist who expressed concern for possible esophageal perforation. [JR]   1532 I placed a consult to thoracic surgery.  I have ordered blood cultures and lactate.  Patient is not ill-appearing at this time. [JR]   1533 Patient and her  informed of the abnormal CT findings and need for admission for further evaluation.  They are in agreement with plan. [JR]   1555 Discussed with Dr. Borja, [facility], who agrees to admit. [JR]   1616 Discussed with Dr. La, thoracic surgery.  He agrees to consult.  He requested a dedicated CT neck soft tissue with IV contrast to evaluate for possible oropharyngeal abscess that is tracking inferiorly.  He also requested an esophagram and empiric antibiotics including vancomycin, Zosyn, Flagyl. [JR]      ED Course User Index  [JR] Suhail Dickerson MD                  DIAGNOSIS  Final diagnoses:   Paratracheal gas collection         DISPOSITION  Admit            Latest Documented Vital Signs:  As of 16:20 EDT  BP- 105/70 HR- 98 Temp- 99.9 °F (37.7 °C) O2 sat- 97%              --    Please note that portions of this were completed with a voice recognition program.       Note Disclaimer: At [facility], we believe that sharing information builds trust and better relationships. You are receiving this note because you are receiving care at [facility] or recently visited. It is possible you will see health information before a provider has talked with you about it. This kind of information can be easy to misunderstand. To help you fully understand what it means for your health, we urge you to discuss this note with your provider.           Suhail Dickerson MD  04/11/23 1620      Electronically signed by Suhail Dickerson MD at 04/11/23 1620     Yanelis Davis RN at 04/11/23 1820          Nursing report ED to floor  [facility]  37 y.o.  female    HPI :   Chief Complaint   Patient presents with   • Chest Pain   • Cough       Admitting doctor:   Denise Borja MD    Admitting diagnosis:   The encounter diagnosis was Paratracheal gas collection.    Code status:   Current Code Status       Date Active Code Status Order ID Comments User Context       4/11/2023 1657 CPR (Attempt to Resuscitate) [id number]  Denise Borja MD ED        Question Answer    Code Status (Patient has no pulse and is not breathing) CPR (Attempt to Resuscitate)    Medical Interventions (Patient has pulse or is breathing) Full                    Allergies:   Patient has no known allergies.    Isolation:   No active isolations    Intake and Output  No intake or output data in the 24 hours ending 04/11/23 1820    Weight:       04/11/23  1553   Weight: 70.3 kg (155 lb)       Most recent vitals:   Vitals:    04/11/23 1231 04/11/23 1431 04/11/23 1553 04/11/23 1601  "  BP: 101/74 111/79 105/70 107/72   BP Location:   Left arm    Patient Position:   Sitting    Pulse: 81 96 98 101   Resp:   20    Temp:       SpO2: 98% 98% 97% 97%   Weight:   70.3 kg (155 lb)    Height:   175.3 cm (69.02\")        Active LDAs/IV Access:   Lines, Drains & Airways       Active LDAs       Name Placement date Placement time Site Days    Peripheral IV 04/11/23 1321 Right Antecubital 04/11/23  1321  Antecubital  less than 1                    Labs (abnormal labs have a star):   Labs Reviewed   COMPREHENSIVE METABOLIC PANEL - Abnormal; Notable for the following components:       Result Value    Glucose 101 (*)     All other components within normal limits    Narrative:     GFR Normal >60  Chronic Kidney Disease <60  Kidney Failure <15     CBC WITH AUTO DIFFERENTIAL - Abnormal; Notable for the following components:    WBC 13.39 (*)     Hematocrit 48.5 (*)     MCHC 31.3 (*)     Platelets 466 (*)     Lymphocyte % 15.8 (*)     Eosinophil % 7.2 (*)     Neutrophils, Absolute 9.30 (*)     Eosinophils, Absolute 0.96 (*)     All other components within normal limits   TROPONIN - Normal    Narrative:     High Sensitive Troponin T Reference Range:  <10.0 ng/L- Negative Female for AMI  <15.0 ng/L- Negative Male for AMI  >=10 - Abnormal Female indicating possible myocardial injury.  >=15 - Abnormal Male indicating possible myocardial injury.   Clinicians would have to utilize clinical acumen, EKG, Troponin, and serial changes to determine if it is an Acute Myocardial Infarction or myocardial injury due to an underlying chronic condition.        HCG, SERUM, QUALITATIVE - Normal   TSH - Normal   T4, FREE - Normal    Narrative:     Results may be falsely increased if patient taking Biotin.     LACTIC ACID, PLASMA - Normal   BLOOD CULTURE   BLOOD CULTURE   RAINBOW DRAW    Narrative:     The following orders were created for panel order [place] Draw.  Procedure                               Abnormality         Status        "              ---------                               -----------         ------                     Green Top (Gel)[[id number]]                                  Final result               Lavender Top[[id number]]                                     Final result               Gold Top - SST[[id number]]                                                              Light Blue Top[[id number]]                                   Final result                 Please view results for these tests on the individual orders.   HIGH SENSITIVITIY TROPONIN T 2HR    Narrative:     High Sensitive Troponin T Reference Range:  <10.0 ng/L- Negative Female for AMI  <15.0 ng/L- Negative Male for AMI  >=10 - Abnormal Female indicating possible myocardial injury.  >=15 - Abnormal Male indicating possible myocardial injury.   Clinicians would have to utilize clinical acumen, EKG, Troponin, and serial changes to determine if it is an Acute Myocardial Infarction or myocardial injury due to an underlying chronic condition.        CBC AND DIFFERENTIAL    Narrative:     The following orders were created for panel order CBC & Differential.  Procedure                               Abnormality         Status                     ---------                               -----------         ------                     CBC Auto Differential[[id number]]        Abnormal            Final result                 Please view results for these tests on the individual orders.   GREEN TOP   LAVENDER TOP   LIGHT BLUE TOP       EKG:   ECG 12 Lead Chest Pain   Final Result   HEART RATE= 84  bpm   RR Interval= 714  ms   UT Interval= 141  ms   P Horizontal Axis= 9  deg   P Front Axis= 69  deg   QRSD Interval= 88  ms   QT Interval= 350  ms   QRS Axis= 71  deg   T Wave Axis= -36  deg   - ABNORMAL ECG -   Sinus rhythm   Nonspecific T abnormalities, inferior leads   No change from previous tracing   Electronically Signed By: Favian Farrar ([facility]) 11-Apr-2023 17:38:23   Date  and Time of Study: 2023-04-11 12:42:17      ECG 12 Lead Chest Pain   Final Result   HEART RATE= 77  bpm   RR Interval= 779  ms   LA Interval= 146  ms   P Horizontal Axis= -14  deg   P Front Axis= 72  deg   QRSD Interval= 90  ms   QT Interval= 373  ms   QRS Axis= 78  deg   T Wave Axis= -38  deg   - ABNORMAL ECG -   Sinus rhythm   Abnormal T, consider ischemia, anterior leads   No Prior Tracing for Comparison   Electronically Signed By: Favian Farrar ([facility]) 11-Apr-2023 17:38:29   Date and Time of Study: 2023-04-11 11:23:35      ECG 12 Lead ED Triage Standing Order; Chest Pain    (Results Pending)       Meds given in ED:   Medications   sodium chloride 0.9 % flush 10 mL (has no administration in time range)   sodium chloride 0.9 % flush 10 mL (has no administration in time range)   vancomycin 1500 mg/500 mL 0.9% NS IVPB ([facility]) (has no administration in time range)   sodium chloride 0.9 % infusion (125 mL/hr Intravenous New Bag 4/11/23 1731)   acetaminophen (TYLENOL) tablet 650 mg (has no administration in time range)   ondansetron (ZOFRAN) tablet 4 mg (has no administration in time range)     Or   ondansetron (ZOFRAN) injection 4 mg (has no administration in time range)   melatonin tablet 3 mg (has no administration in time range)   aspirin tablet 325 mg (325 mg Oral Given 4/11/23 1202)   iopamidol (ISOVUE-370) 76 % injection 95 mL (95 mL Intravenous Given 4/11/23 1504)   piperacillin-tazobactam (ZOSYN) 3.375 g in iso-osmotic dextrose 50 ml (premix) (3.375 g Intravenous New Bag 4/11/23 1639)   metroNIDAZOLE (FLAGYL) IVPB 500 mg (500 mg Intravenous New Bag 4/11/23 1740)   iopamidol (ISOVUE-300) 61 % injection 100 mL (93 mL Intravenous Given 4/11/23 1719)       Imaging results:  XR Chest 2 View    Result Date: 4/11/2023  No evidence for acute pulmonary process. Follow-up as clinical indications persist.  This report was finalized on 4/11/2023 1:06 PM by ELENA YeungD.      CT Angiogram Chest Pulmonary  Embolism    Result Date: 4/11/2023  1. Abnormal gas collection to the right of the esophagus at the level of the thoracic inlet with surrounding soft tissue inflammation that extends into the posterior mediastinum and adjacent to the esophagus. There is also soft tissue wall thickening. Findings are suspicious for esophageal perforation with abscess and mediastinal inflammation. 2. No evidence for pulmonary thromboembolic disease.  Findings discussed with Dr. Dickerson in the emergency department 04/11/2023 at 3:30 PM.  Radiation dose reduction techniques were utilized, including automated exposure control and exposure modulation based on body size.        Ambulatory status:   - with assist    Social issues:   Social History     Socioeconomic History   • Marital status:    Tobacco Use   • Smoking status: Never     Passive exposure: Never   • Smokeless tobacco: Never   Vaping Use   • Vaping Use: Never used   Substance and Sexual Activity   • Alcohol use: Defer   • Drug use: Not Currently   • Sexual activity: Never       NIH Stroke Scale:         Yanelis Davis RN  04/11/23 18:20 EDT          Electronically signed by Yanelis Davis RN at 04/11/23 1820     Marian Robertson RN at 04/11/23 1901        Attempted to call report x2. Notified the unit to call back if they had any questions, pt being put in for transport per ED protocol.     Electronically signed by Marian Robertson RN at 04/11/23 1903       Lines, Drains & Airways     Active LDAs     Name Placement date Placement time Site Days    Peripheral IV 04/11/23 1321 Right Antecubital 04/11/23  1321  Antecubital  1    Peripheral IV 04/12/23 0320 Anterior;Left Forearm 04/12/23  0320  Forearm  less than 1    Closed/Suction Drain 1 Right Neck Bulb 10 Fr. 04/11/23  2208  Neck  less than 1          Inactive LDAs     Name Placement date Placement time Removal date Removal time Site Days    [REMOVED] ETT  04/11/23 2053  created via procedure documentation  04/11/23   2240  -- less than 1                     Operative/Procedure Notes (last 48 hours)      Hemanth La MD at 04/11/23 2037          Operative Note     Date of procedure: 4/11/2023     Patient name: Wilda Mayo  MRN: [id number]    Pre OP diagnosis:  1. Parapharyngeal collection.  2. Possible infected paratracheal cyst?  3. Superior mediastinitis.  4. Asthma.    Post OP diagnosis:  1. No evidence of esophageal or tracheal injury.  2. Superior mediastinitis.  3. Neck lipoma.  4. Asthma.    Procedure performed:   1. Flexible bronchoscopy.  2. Esophagogastroduodenoscopy.  3. Right neck exploration.  4. Excision of para carotid neck mass.    Indications:   Wilda Mayo is a 37 year old female who does not have significant medical problems.  She was in her usual state of health until yesterday when she started having right chest pain radiating to her neck and scapular region. There was no precipitating events.  The pain progressively worsen and prompted her to come to the ER on 4/11/2023.  The pain was exacerbated with coughing and her anterior neck was tender to touch.  She denied fever, chills, rigors.  No prior report of similar pain.  She denies history of neck or chest surgery.  No prior instrumentation or endoscopic intervention.  She denied unintentional weight loss or diagnosis of cancer.  She denied smoking, vaping, drug abuse or alcohol intake in excess.  She works for [employer].  She moved to [place] 6 months ago from [place].  She is originally from [place] and  her  2 years ago who is from [place].  They have 1 year kid who is healthy.  She is otherwise very active and independent in her activities of daily living.  For the last 2 years, she has been getting evaluation for asthma.  She is currently on fluticasone and albuterol inhaler.  She denied reflux symptoms.     In the ER, she was found to have mild leukocytosis and CT of the chest was obtained to rule out pulmonary embolism.  There was  no evidence of pulmonary embolism but she was found to have abnormal gas collection to the right of the esophagus at the level of the thoracic inlet with surrounding soft tissue inflammation which extend into the posterior mediastinum adjacent to the esophagus.  This was concerning for esophageal perforation.    Esophagram did not show any evidence of esophageal perforation.  CT scan of the neck again demonstrated complex collection with air next to the posterior esophagus towards the right side.  She was hemodynamically stable and afebrile.     Though she did not have evidence of esophageal perforation on esophagram.  The CT scan findings were concerning for abscess in the deep cervical space.  I I discussed the findings with the patient and her .  I suggested that we should take her to the operating room for EGD, bronchoscopy and neck exploration.       Surgeon: Hemanth La MD     Assistants: Ember Hartmann CRNFA was responsible for performing the following activities: Retraction, Suction, Irrigation, Suturing, Closing, Placing Dressing and Held/Positioned Camera and their skilled assistance was necessary for the success of this case.    Anesthesia: General endotracheal anesthesia    ASA Class: 2    Procedure Details   On 4/11/2023, the patient was brought to the operating room and placed in the supine position on the operating room table. Prior to beginning the operation, a time-out was conducted with all members of surgical team present. The patient was identified as Wilda Mayo, the procedure and the correct site were verified.     Following an uneventful induction of general anesthesia, patient was intubated with a single-lumen endotracheal tube without incident.  The endotracheal tube was placed over a flexible bronchoscopy.  I performed a bronchoscopy.  I did not find laryngeal lesion.  The vocal cord appeared normal.  The proximal trachea had no mucosal irregularity.  The distal trachea,  right mainstem, left mainstem, segmental bronchi all appeared normal.  The endotracheal tube was parked above the lindsey and secured.    I then started performing a flexible esophagogastroduodenoscopy.   A flexible adult endoscope was placed into the oropharynx and advanced down the proximal esophagus under direct vision.  There was no mucosal abnormality in the oropharynx.  The cricopharyngeus was located 15 cm.  The proximal and mid thoracic esophagus appeared normal.  The Z-line was located at 39 cm.  There was no esophagitis, Corona's esophagus or other mucosal abnormalities.  The endoscope was advanced into the stomach and retroflexed.  A type I Hill valve was noted.  The stomach appeared normal. The pylorus and 1st and 2nd portions of the duodenum appeared normal.    The patient was already on broad-spectrum antibiotics and additional antibiotics were not indicated.  The patient's neck, chest were prepped and draped in the usual sterile fashion.  I made an incision along the right sternocleidomastoid muscle from the hyoid bone to above the sternal notch.  The incision was carried down to the subcutaneous tissue.  The platysma was incised.  The sternocleidomastoid was retracted laterally.  The sternothyroid was retracted medially.  The sternothyroid muscle was mobilized throughout its length.  The tissue planes were edematous.  I did not encounter any purulent drainage.  I completely exposed the right hemithyroid.  The proximal trachea was exposed anteriorly from the thyroid cartilage to the mid trachea up to the thoracic inlet.  Between the sternocleidomastoid and sternothyroid muscle, I stayed in an avascular plane and dissected down to the cervical spine.  Under the sternocleidomastoid, I stayed in an avascular plane completely exposing the right internal jugular vein and right common carotid artery.  All the planes were edematous but I did not encounter any purulent drainage.  There was 3 x 2 cm fat pad  medial to the carotid artery at the level of the base of the neck.  I mobilized circumferentially and its feeding vessels were tied off.  The specimen was excised and sent for pathology analysis.  It appeared as a neck lipoma.  It was evident that there was no deep space purulent fluid.  I considered the neck exploration adequate.  I left a 10 Chinese flat NASEEM drain in the deep space.  The sternocleidomastoid and sternohyoid muscle were approximated using 3-0 Vicryl suture.  The platysma, subcutaneous fat layer and skin were closed in layers.  The drain was secured to the skin.    The sponge, needle, and instrument counts were correct at the end of the operation.  The patient was awakened from anesthesia, was extubated without incident, and was transported to the Post Anesthesia Care Unit in stable condition.  The patient had a good cough and was at the end of the procedure.    Findings:  1.  No evidence of tracheal cyst or injury.  2.  No evidence of oropharyngeal abnormality, esophageal cyst, diverticulum or mucosal abnormality.  3.  Edematous tissue planes suggestive of deep space inflammation.  4.  No evidence of purulent collection.  5.  3 x 2 cm mass lying anterior medial to the carotid artery at the level of the neck base.  Grossly it appeared as a neck lipoma.  6.  Patient was extubated, had good cough and voice.        Estimated Blood Loss:  minimal           Drains: 10 Chinese flat NASEEM drain.                 Specimens:   ID Type Source Tests Collected by Time   A : Right Neck Mass Tissue Neck TISSUE PATHOLOGY EXAM Hemanth La MD 4/11/2023 4321              Implants: * No implants in log *           Complications: None           Disposition: PACU - hemodynamically stable.           Condition: stable     Hemanth La MD   Thoracic Surgeon  [facility]       Electronically signed by Hemanth La MD at 04/12/23 7499          Physician Progress Notes (last 48 hours)      Hemanth La MD at  "04/12/23 0633        Progress note    No acute issues overnight.  Pain well controlled with current medications.  Patient resting comfortably in bed.  Borderline low blood pressure when sleeping without tachycardia.  Remains afebrile.   stayed with her last night.    BP (!) 89/53   Pulse 54   Temp 98.2 °F (36.8 °C) (Oral)   Resp 16   Ht 175.3 cm (69.02\")   Wt 70.3 kg (155 lb)   LMP 04/08/2023 (Exact Date)   SpO2 97%   BMI 22.88 kg/m²     No acute distress.  Resting comfortably.  Alert, oriented x4.  Nonlabored breathing.  Neck supple, preop fullness in the right neck improved.  NASEEM drain in place with minimal serosanguineous drainage.  Incision covered, skin glue with stained with blood.  Moving all 4 extremities.    Chest x-ray reviewed.  Mild tracheal shift.  No concerning intra thoracic abnormality.    WBC 14 K from 13 K.  Likely reactive from surgical stress versus inflammation/unclear source of infection.    37-year-old female otherwise active with unusual presentation of sudden onset neck and chest pain towards the right side without clear etiology.    Assessment:  No evidence of esophageal perforation confirmed with esophagram and upper endoscopy.  No evidence of tracheal diverticulum or cyst confirmed with bronchoscopy.  Concern for deep space neck infection seen on CT scan.  Edematous tissue planes and air pocket noted during neck exploration.  Superior mediastinum was not explored due to no evidence of gene purulence at the base of the neck.    Unusual clinical presentation, she might have a subclinical hypopharyngeal perforation that might have sealed but caused inflammation in the deep cervical space and superior mediastinitis.    Plan:  Tylenol, Toradol and Dilaudid as needed for pain control.  Hypotension likely physiological.  Normal heart rate and patient looks really well and clinical picture does not reflect sepsis.  Continue broad-spectrum IV antibiotics.  Patient had \"red man\" " syndrome with vancomycin infusion yesterday.  No need to continue vancomycin or fluconazole.  Continue to monitor WBC.  NASEEM drain to stay on bulb suction.  Can advance to soft diet.  She will stay on mechanical soft diet for 2 weeks.  Chemical DVT prophylaxis.  If WBC is trending down, she can probably go home tomorrow on oral antibiotics for 1 week.  I will see her in clinic in a week.    Hemanth La MD  Thoracic surgeon    Electronically signed by Hemanth La MD at 04/12/23 0645     Hemanth La MD at 04/11/23 1935        Patient CT scan of the neck and esophagram reviewed.    There is no evidence of esophageal perforation.  She might have a minor cervical esophagus or hypopharyngeal perforation that has sealed.  She has abscess in the right deep cervical space extending into the superior mediastinum.  She has been started antibiotics.  She will need drainage of the deep space abscess.    I discussed with her and her  the indication for incision and drainage.  I am planning EGD and incision and drainage tonight.    Hemanth La MD  Thoracic surgeon    Electronically signed by Hemanth La MD at 04/11/23 1938          Consult Notes (last 48 hours)      Suresh Saldivar MD at 04/12/23 1106      Consult Orders    1. Inpatient Infectious Diseases Consult [[id number]] ordered by Xocihlt Livingston MD at 04/12/23 0904               Referring Provider: Dr Livingston      Subjective   History of present illness: 37-year-old we are asked to see for possible mediastinitis.  Briefly, patient presented to [facility]/2023 with acute onset of severe right-sided neck pain.  She denies any antecedent symptoms.  No nausea or vomiting.  No trauma.  No URI or sick contacts.  No cardiopulmonary symptoms.  She underwent a CT scan which showed a possible gas collection and was taken to the OR by Dr. La with no clear source of injury or purulence.  She has been on Zosyn and is tolerating this nicely.   She has been afebrile.    She says she originally moved to the [place] from [place] about 6 years ago.  About 5 months ago moved to [place] from [place]    Past Medical History:   Diagnosis Date   • Asthma        Past Surgical History:   Procedure Laterality Date   • ESOPHAGUS SURGERY N/A 4/11/2023    Procedure: EXPLORATION OF NECK, EXCISION OF NECK MASS, EGD, AND FLEXIBLE BRONCHOSCOPY;  Surgeon: Hemanth La MD;  Location: [facility];  Service: Thoracic;  Laterality: N/A;       Physical Exam:   Vital Signs   Temp:  [97.7 °F (36.5 °C)-99.9 °F (37.7 °C)] 97.7 °F (36.5 °C)  Heart Rate:  [] 58  Resp:  [14-20] 14  BP: ()/(53-80) 95/58    GENERAL: Awake and alert, in no acute distress.   HEENT: Oropharynx is clear. Hearing is grossly normal.   EYES: . No conjunctival injection. No lid lag.   LUNGS:normal respiratory effort.   SKIN: no cutaneous eruptions in exposed areas; Right sided neck drain in place  PSYCHIATRIC: Appropriate mood, affect, insight, and judgment.       Results Review:  WBC 14.9, hgb 12.6, plt 348  Lactate 0.7  Cr 0.7; LFTs nl    Microbiology:  4/11 BCx P    Radiology:   CXR, independently interpreted: no pna  CT of the chest shows abnormal collection of gas at the level of thoracic inlet near the esophagus.  CTA of the neck showed complex collection of air-fluid and soft tissue 3.1 x 2.5 cm thought secondary to contained esophageal perforation    A/p  1.  Mediastinitis: I think the way Dr La is putting this together with subclinical or microperforation leading to mediastinitis makes most sense to me.  She seems to doing very well.  White count is elevated likely combination of infectious and stress reaction due to surgery.  Clinically no evidence of renegade infection.  We will continue the Zosyn for now and plan to transition to oral antibiotics in the morning if still doing well and cultures remain negative           Thank you for this consult.  We will continue to follow along  and tailor antibiotics as the patient's clinical course evolves.      Suresh Saldivar MD  04/12/23  11:07 EDT          Electronically signed by Suresh Saldivar MD at 04/12/23 1228     Hemanth La MD at 04/11/23 1812      Consult Orders    1. Inpatient Thoracic Surgery Consult [[id number]] ordered by Suhail Dickerson MD at 04/11/23 1531                 THORACIC SURGERY INPATIENT CONSULT NOTE    REASON FOR CONSULT: Rule out esophageal perforation    REFERRING PROVIDER: Suhail Dickerson MD     Subjective   HISTORY OF PRESENTING ILLNESS:   Wilda Mayo is a 37 y.o. female who does not have significant medical problems.  She is Citizen of [place]-speaking and not fluent in English but able to understand and conversant with the help from his .      She was in her usual state of health until yesterday when she started having right chest pain radiating to her neck and scapular region.  She denies precipitating events.  There was no precipitating events.  The pain progressively worsen and prompted her to come to the ER on 4/11/2023.  The pain is exacerbated with coughing and her anterior neck is tender to touch.  She denies fever, chills, rigors.  No prior report of similar pain.  She denies history of neck or chest surgery.  No prior instrumentation or endoscopy intervention.  She denies unintentional weight loss or diagnosis of cancer.  She denies smoking, vaping, drug abuse or alcohol intake in excess.  She works for [employer].  She moved to [place] 6 months ago from [place].  She is originally from [place] and  her  2 years ago who is from [place].  They have 1 year kid who is healthy.  She is otherwise very active and independent in her activities of daily living.  For the last 2 years, she has been getting evaluation for asthma.  He is currently on fluticasone and albuterol inhaler.  She denies reflux symptoms.    In the ER, she was found to have mild leukocytosis and CT of the  chest was obtained to rule out pulm embolism.  There was no evidence of pulmonary embolism but she was found to have abnormal gas collection to the right of the esophagus at the level of the thoracic inlet with surrounding soft tissue inflammation which extend into the posterior mediastinum adjacent to the esophagus.  This was concerning for esophageal perforation.  She was hemodynamically stable and afebrile.  Thoracic surgery was consulted for further evaluation.      Past Medical History:   Diagnosis Date   • Asthma        History reviewed. No pertinent surgical history.    History reviewed. No pertinent family history.    Social History     Socioeconomic History   • Marital status:    Tobacco Use   • Smoking status: Never     Passive exposure: Never   • Smokeless tobacco: Never   Vaping Use   • Vaping Use: Never used   Substance and Sexual Activity   • Alcohol use: Defer   • Drug use: Not Currently   • Sexual activity: Never         Current Facility-Administered Medications:   •  acetaminophen (TYLENOL) tablet 650 mg, 650 mg, Oral, Q4H PRN, Denise Borja MD  •  melatonin tablet 3 mg, 3 mg, Oral, Nightly PRN, Denise Borja MD  •  ondansetron (ZOFRAN) tablet 4 mg, 4 mg, Oral, Q6H PRN **OR** ondansetron (ZOFRAN) injection 4 mg, 4 mg, Intravenous, Q6H PRN, Denise Borja MD  •  sodium chloride 0.9 % flush 10 mL, 10 mL, Intravenous, PRN, Emergency, Triage Protocol, MD  •  [COMPLETED] Insert Peripheral IV, , , Once **AND** sodium chloride 0.9 % flush 10 mL, 10 mL, Intravenous, PRN, Suhail Dickerson MD  •  sodium chloride 0.9 % infusion, 125 mL/hr, Intravenous, Continuous, Suhail Dickerson MD, Last Rate: 125 mL/hr at 04/11/23 1731, 125 mL/hr at 04/11/23 1731  •  vancomycin 1500 mg/500 mL 0.9% NS IVPB ([facility]), 20 mg/kg, Intravenous, Once, Suhail Dickerson MD    Current Outpatient Medications:   •  albuterol (PROVENTIL) (2.5 MG/3ML) 0.083% nebulizer solution, Take 2.5 mg  "by nebulization Every 4 (Four) Hours As Needed for Wheezing or Shortness of Air., Disp: 90 mL, Rfl: 0  •  albuterol sulfate  (90 Base) MCG/ACT inhaler, Inhale 2 puffs Every 4 (Four) Hours As Needed for Wheezing., Disp: , Rfl:   •  Fluticasone-Salmeterol (ADVAIR/WIXELA) 250-50 MCG/ACT DISKUS, Inhale 2 (Two) Times a Day., Disp: , Rfl:      No Known Allergies         Objective    OBJECTIVE:     VITAL SIGNS:  /72   Pulse 101   Temp 99.9 °F (37.7 °C)   Resp 20   Ht 175.3 cm (69.02\")   Wt 70.3 kg (155 lb)   LMP 04/08/2023 (Exact Date)   SpO2 97%   BMI 22.88 kg/m²     PHYSICAL EXAM:  Constitutional:       Appearance: Normal appearance.   HENT:      Head: Normocephalic.      Right Ear: External ear normal.      Left Ear: External ear normal.      Nose: Nose normal.      Mouth/Throat: No obvious deformity     Pharynx: Oropharynx is clear.   Eyes:      Conjunctiva/sclera: Conjunctivae normal.   Cardiovascular:      Rate and Rhythm: Normal rate.      Pulses: Normal pulses.   Pulmonary:      Effort: Pulmonary effort is normal.   Abdominal:      Palpations: Abdomen is soft.   Musculoskeletal:         General: Normal range of motion.      Cervical back: Normal range of motion.   Skin:     General: Skin is warm.   Neurological:      General: No focal deficit present.      Mental Status: He is alert and oriented to person, place, and time.     LAB RESULTS:  I have reviewed all the available laboratory results in the chart.    RESULTS REVIEW:  I have reviewed the patient's all relevant laboratory and imaging findings.     IMAGING RESULTS:    CT chest 4/11/2023:  1. Abnormal gas collection to the right of the esophagus at the level of  the thoracic inlet with surrounding soft tissue inflammation that  extends into the posterior mediastinum and adjacent to the esophagus.  There is also soft tissue wall thickening. Findings are suspicious for  esophageal perforation with abscess and mediastinal inflammation.  2. No " evidence for pulmonary thromboembolic disease.    CT neck 4/11/2023:   Pending    Esophagram 4/11/2023:  Pending       ASSESSMENT & PLAN:  Wilda Mayo is a 37 y.o. female with significant medical conditions as mentioned above presented to the hospital with right chest and neck pain.    She is an otherwise active and healthy individual.  She had sudden onset of right chest pain radiating to the right neck.  There is mild tenderness on examination at the base of the neck near sternal angle.  There is no obvious fluctuation or erythema.  There is no evidence of open wound or incision.  The CT scan findings are unusual but are suspicious for cervical or proximal thoracic esophageal perforation.  The CT chest does not completely show the neck.  I recommended dedicated CT scan of the neck.  I also recommended esophagram as a confirmatory test for esophageal perforation.  I recommend starting her on empiric antibiotics and keeping her NPO till we have complete evaluation.    We will continue to follow her closely.    I discussed the patients findings and my recommendations with the patient. The patient was given adequate time to ask questions and all questions were answered to patient satisfaction. Thank you for this consult and allowing us to participate in the care of your patient.      Hemanth La MD  Thoracic Surgeon  [facility]        Dictated utilizing Dragon dictation    I spent 75 minutes caring for Wilda on this date of service. This time includes time spent by me in the following activities:reviewing tests, obtaining and/or reviewing a separately obtained history, performing a medically appropriate examination and/or evaluation , counseling and educating the patient/family/caregiver, ordering medications, tests, or procedures, referring and communicating with other health care professionals , documenting information in the medical record, independently interpreting results and  communicating that information with the patient/family/caregiver, and care coordination and more than half the time was spent in direct face to face evaluation and decision making.       Electronically signed by Hemanth La MD at 04/11/23 6593

## 2023-04-12 NOTE — ANESTHESIA PROCEDURE NOTES
Airway  Urgency: elective    Date/Time: 4/11/2023 8:34 PM  Airway not difficult    General Information and Staff    Patient location during procedure: OR  Anesthesiologist: Roberta Lane MD    Indications and Patient Condition  Indications for airway management: airway protection    Preoxygenated: yes  MILS maintained throughout  Mask difficulty assessment: 1 - vent by mask    Final Airway Details  Final airway type: endotracheal airway      Successful airway: ETT  Cuffed: yes   Successful intubation technique: flexible bronchoscopy  Endotracheal tube insertion site: oral  ETT size (mm): 8.0  Placement verified by: chest auscultation and capnometry   Measured from: teeth  Number of attempts at approach: 1  Assessment: lips, teeth, and gum same as pre-op and atraumatic intubation    Additional Comments  Gently masked while muscle relaxant taking effect. Flexible adult bronchoscope used for intubation at the request of surgeon to visualize proximal trachea during intubation. Bronchoscope advanced into the trachea by the surgeon, 8.0 tube advanced easily. Glottis clear throughout.

## 2023-04-12 NOTE — PROGRESS NOTES
"Progress note    No acute issues overnight.  Pain well controlled with current medications.  Patient resting comfortably in bed.  Borderline low blood pressure when sleeping without tachycardia.  Remains afebrile.   stayed with her last night.    BP (!) 89/53   Pulse 54   Temp 98.2 °F (36.8 °C) (Oral)   Resp 16   Ht 175.3 cm (69.02\")   Wt 70.3 kg (155 lb)   LMP 04/08/2023 (Exact Date)   SpO2 97%   BMI 22.88 kg/m²     No acute distress.  Resting comfortably.  Alert, oriented x4.  Nonlabored breathing.  Neck supple, preop fullness in the right neck improved.  NASEEM drain in place with minimal serosanguineous drainage.  Incision covered, skin glue with stained with blood.  Moving all 4 extremities.    Chest x-ray reviewed.  Mild tracheal shift.  No concerning intra thoracic abnormality.    WBC 14 K from 13 K.  Likely reactive from surgical stress versus inflammation/unclear source of infection.    37-year-old female otherwise active with unusual presentation of sudden onset neck and chest pain towards the right side without clear etiology.    Assessment:  No evidence of esophageal perforation confirmed with esophagram and upper endoscopy.  No evidence of tracheal diverticulum or cyst confirmed with bronchoscopy.  Concern for deep space neck infection seen on CT scan.  Edematous tissue planes and air pocket noted during neck exploration.  Superior mediastinum was not explored due to no evidence of gene purulence at the base of the neck.    Unusual clinical presentation, she might have a subclinical hypopharyngeal perforation that might have sealed but caused inflammation in the deep cervical space and superior mediastinitis.    Plan:  Tylenol, Toradol and Dilaudid as needed for pain control.  Hypotension likely physiological.  Normal heart rate and patient looks really well and clinical picture does not reflect sepsis.  Continue broad-spectrum IV antibiotics.  Patient had \"red man\" syndrome with vancomycin " infusion yesterday.  No need to continue vancomycin or fluconazole.  Continue to monitor WBC.  NASEEM drain to stay on bulb suction.  Can advance to soft diet.  She will stay on mechanical soft diet for 2 weeks.  Chemical DVT prophylaxis.  If WBC is trending down, she can probably go home tomorrow on oral antibiotics for 1 week.  I will see her in clinic in a week.    Hemanth La MD  Thoracic surgeon

## 2023-04-12 NOTE — PLAN OF CARE
Goal Outcome Evaluation:  Plan of Care Reviewed With: patient        Progress: improving  Outcome Evaluation: VSS ex soft BP. Alert and oriented x4. On room air. No c/o SOA. Up to toilet with stand-by assist. Right-sided neck incision C/D/I and soft. NASEEM drain to bulb suction. Minimal output this shift. Voiding without difficulty. BM x2. N/V improved with prn zofran. Pain managed with prn IV pain medication. SB/NSR on monitor. Will continue to provide supportive care.

## 2023-04-12 NOTE — ANESTHESIA POSTPROCEDURE EVALUATION
"Patient: Wilda Mayo    Procedure Summary     Date: 04/11/23 Room / Location: [facility] OR [address] [place] [address] MAIN OR    Anesthesia Start: 2020 Anesthesia Stop: 2244    Procedure: EXPLORATION OF NECK, EXCISION OF NECK MASS, EGD, AND FLEXIBLE BRONCHOSCOPY (Abdomen) Diagnosis:     Surgeons: Hemanth La MD Provider: Roberta Lane MD    Anesthesia Type: general ASA Status: 2 - Emergent          Anesthesia Type: general    Vitals  Vitals Value Taken Time   /67 04/11/23 2316   Temp 36.6 °C (97.9 °F) 04/11/23 2250   Pulse 67 04/11/23 2317   Resp 16 04/11/23 2250   SpO2 99 % 04/11/23 2317   Vitals shown include unvalidated device data.        Post Anesthesia Care and Evaluation    Patient location during evaluation: bedside  Patient participation: complete - patient participated  Level of consciousness: awake  Pain management: adequate    Airway patency: patent  Anesthetic complications: No anesthetic complications    Cardiovascular status: acceptable  Respiratory status: acceptable  Hydration status: acceptable    Comments: /65   Pulse 77   Temp 36.6 °C (97.9 °F) (Oral)   Resp 16   Ht 175.3 cm (69.02\")   Wt 70.3 kg (155 lb)   LMP 04/08/2023 (Exact Date)   SpO2 98%   BMI 22.88 kg/m²       "

## 2023-04-12 NOTE — OP NOTE
Operative Note     Date of procedure: 4/11/2023     Patient name: Wilda Mayo  MRN: [id number]    Pre OP diagnosis:  1. Parapharyngeal collection.  2. Possible infected paratracheal cyst?  3. Superior mediastinitis.  4. Asthma.    Post OP diagnosis:  1. No evidence of esophageal or tracheal injury.  2. Superior mediastinitis.  3. Neck lipoma.  4. Asthma.    Procedure performed:   1. Flexible bronchoscopy.  2. Esophagogastroduodenoscopy.  3. Right neck exploration.  4. Excision of para carotid neck mass.    Indications:   Wilda Mayo is a 37 year old female who does not have significant medical problems.  She was in her usual state of health until yesterday when she started having right chest pain radiating to her neck and scapular region. There was no precipitating events.  The pain progressively worsen and prompted her to come to the ER on 4/11/2023.  The pain was exacerbated with coughing and her anterior neck was tender to touch.  She denied fever, chills, rigors.  No prior report of similar pain.  She denies history of neck or chest surgery.  No prior instrumentation or endoscopic intervention.  She denied unintentional weight loss or diagnosis of cancer.  She denied smoking, vaping, drug abuse or alcohol intake in excess.  She works for [employer].  She moved to [place] 6 months ago from [place].  She is originally from [place] and  her  2 years ago who is from [place].  They have 1 year kid who is healthy.  She is otherwise very active and independent in her activities of daily living.  For the last 2 years, she has been getting evaluation for asthma.  She is currently on fluticasone and albuterol inhaler.  She denied reflux symptoms.     In the ER, she was found to have mild leukocytosis and CT of the chest was obtained to rule out pulmonary embolism.  There was no evidence of pulmonary embolism but she was found to have abnormal gas collection to the right of the esophagus at the level of  the thoracic inlet with surrounding soft tissue inflammation which extend into the posterior mediastinum adjacent to the esophagus.  This was concerning for esophageal perforation.    Esophagram did not show any evidence of esophageal perforation.  CT scan of the neck again demonstrated complex collection with air next to the posterior esophagus towards the right side.  She was hemodynamically stable and afebrile.     Though she did not have evidence of esophageal perforation on esophagram.  The CT scan findings were concerning for abscess in the deep cervical space.  I I discussed the findings with the patient and her .  I suggested that we should take her to the operating room for EGD, bronchoscopy and neck exploration.       Surgeon: Hemanth La MD     Assistants: Ember Hartmann CRNFA was responsible for performing the following activities: Retraction, Suction, Irrigation, Suturing, Closing, Placing Dressing and Held/Positioned Camera and their skilled assistance was necessary for the success of this case.    Anesthesia: General endotracheal anesthesia    ASA Class: 2    Procedure Details   On 4/11/2023, the patient was brought to the operating room and placed in the supine position on the operating room table. Prior to beginning the operation, a time-out was conducted with all members of surgical team present. The patient was identified as Wilda Mayo, the procedure and the correct site were verified.     Following an uneventful induction of general anesthesia, patient was intubated with a single-lumen endotracheal tube without incident.  The endotracheal tube was placed over a flexible bronchoscopy.  I performed a bronchoscopy.  I did not find laryngeal lesion.  The vocal cord appeared normal.  The proximal trachea had no mucosal irregularity.  The distal trachea, right mainstem, left mainstem, segmental bronchi all appeared normal.  The endotracheal tube was parked above the lindsey and  secured.    I then started performing a flexible esophagogastroduodenoscopy.   A flexible adult endoscope was placed into the oropharynx and advanced down the proximal esophagus under direct vision.  There was no mucosal abnormality in the oropharynx.  The cricopharyngeus was located 15 cm.  The proximal and mid thoracic esophagus appeared normal.  The Z-line was located at 39 cm.  There was no esophagitis, Corona's esophagus or other mucosal abnormalities.  The endoscope was advanced into the stomach and retroflexed.  A type I Hill valve was noted.  The stomach appeared normal. The pylorus and 1st and 2nd portions of the duodenum appeared normal.    The patient was already on broad-spectrum antibiotics and additional antibiotics were not indicated.  The patient's neck, chest were prepped and draped in the usual sterile fashion.  I made an incision along the right sternocleidomastoid muscle from the hyoid bone to above the sternal notch.  The incision was carried down to the subcutaneous tissue.  The platysma was incised.  The sternocleidomastoid was retracted laterally.  The sternothyroid was retracted medially.  The sternothyroid muscle was mobilized throughout its length.  The tissue planes were edematous.  I did not encounter any purulent drainage.  I completely exposed the right hemithyroid.  The proximal trachea was exposed anteriorly from the thyroid cartilage to the mid trachea up to the thoracic inlet.  Between the sternocleidomastoid and sternothyroid muscle, I stayed in an avascular plane and dissected down to the cervical spine.  Under the sternocleidomastoid, I stayed in an avascular plane completely exposing the right internal jugular vein and right common carotid artery.  All the planes were edematous but I did not encounter any purulent drainage.  There was 3 x 2 cm fat pad medial to the carotid artery at the level of the base of the neck.  I mobilized circumferentially and its feeding vessels were  tied off.  The specimen was excised and sent for pathology analysis.  It appeared as a neck lipoma.  It was evident that there was no deep space purulent fluid.  I considered the neck exploration adequate.  I left a 10 Luxembourger flat NASEEM drain in the deep space.  The sternocleidomastoid and sternohyoid muscle were approximated using 3-0 Vicryl suture.  The platysma, subcutaneous fat layer and skin were closed in layers.  The drain was secured to the skin.    The sponge, needle, and instrument counts were correct at the end of the operation.  The patient was awakened from anesthesia, was extubated without incident, and was transported to the Post Anesthesia Care Unit in stable condition.  The patient had a good cough and was at the end of the procedure.    Findings:  1.  No evidence of tracheal cyst or injury.  2.  No evidence of oropharyngeal abnormality, esophageal cyst, diverticulum or mucosal abnormality.  3.  Edematous tissue planes suggestive of deep space inflammation.  4.  No evidence of purulent collection.  5.  3 x 2 cm mass lying anterior medial to the carotid artery at the level of the neck base.  Grossly it appeared as a neck lipoma.  6.  Patient was extubated, had good cough and voice.        Estimated Blood Loss:  minimal           Drains: 10 Luxembourger flat NASEEM drain.                 Specimens:   ID Type Source Tests Collected by Time   A : Right Neck Mass Tissue Neck TISSUE PATHOLOGY EXAM Hemanth La MD 4/11/2023 8425              Implants: * No implants in log *           Complications: None           Disposition: PACU - hemodynamically stable.           Condition: stable     Hemanth La MD   Thoracic Surgeon  [facility]

## 2023-04-12 NOTE — CONSULTS
Referring Provider: Dr Livingston      Subjective   History of present illness: 37-year-old we are asked to see for possible mediastinitis.  Briefly, patient [facility] with acute onset of severe right-sided neck pain.  She denies any antecedent symptoms.  No nausea or vomiting.  No trauma.  No URI or sick contacts.  No cardiopulmonary symptoms.  She underwent a CT scan which showed a possible gas collection and was taken to the OR by Dr. La with no clear source of injury or purulence.  She has been on Zosyn and is tolerating this nicely.  She has been afebrile.    She says she originally moved to the [place] from [place] about 6 years ago.  About 5 months ago moved to [place] from [place]    Past Medical History:   Diagnosis Date   • Asthma        Past Surgical History:   Procedure Laterality Date   • ESOPHAGUS SURGERY N/A 4/11/2023    Procedure: EXPLORATION OF NECK, EXCISION OF NECK MASS, EGD, AND FLEXIBLE BRONCHOSCOPY;  Surgeon: Hemanth La MD;  Location: [facility];  Service: Thoracic;  Laterality: N/A;       Physical Exam:   Vital Signs   Temp:  [97.7 °F (36.5 °C)-99.9 °F (37.7 °C)] 97.7 °F (36.5 °C)  Heart Rate:  [] 58  Resp:  [14-20] 14  BP: ()/(53-80) 95/58    GENERAL: Awake and alert, in no acute distress.   HEENT: Oropharynx is clear. Hearing is grossly normal.   EYES: . No conjunctival injection. No lid lag.   LUNGS:normal respiratory effort.   SKIN: no cutaneous eruptions in exposed areas; Right sided neck drain in place  PSYCHIATRIC: Appropriate mood, affect, insight, and judgment.       Results Review:  WBC 14.9, hgb 12.6, plt 348  Lactate 0.7  Cr 0.7; LFTs nl    Microbiology:  4/11 BCx P    Radiology:   CXR, independently interpreted: no pna  CT of the chest shows abnormal collection of gas at the level of thoracic inlet near the esophagus.  CTA of the neck showed complex collection of air-fluid and soft tissue 3.1 x 2.5 cm thought secondary to  contained esophageal perforation    A/p  1.  Mediastinitis: I think the way Dr La is putting this together with subclinical or microperforation leading to mediastinitis makes most sense to me.  She seems to doing very well.  White count is elevated likely combination of infectious and stress reaction due to surgery.  Clinically no evidence of renegade infection.  We will continue the Zosyn for now and plan to transition to oral antibiotics in the morning if still doing well and cultures remain negative           Thank you for this consult.  We will continue to follow along and tailor antibiotics as the patient's clinical course evolves.      Suresh Saldivar MD  04/12/23  11:07 EDT

## 2023-04-12 NOTE — CASE MANAGEMENT/SOCIAL WORK
Discharge Planning Assessment  [facility]     Patient Name: Wilda Mayo  MRN: [id number]  Today's Date: 4/12/2023    Admit Date: 4/11/2023    Plan: Home   Discharge Needs Assessment     Row Name 04/12/23 1621       Living Environment    People in Home spouse;child(shawn), dependent    Current Living Arrangements home    Primary Care Provided by self    Provides Primary Care For no one    Family Caregiver if Needed spouse    Quality of Family Relationships supportive       Resource/Environmental Concerns    Resource/Environmental Concerns none       Transition Planning    Patient/Family Anticipates Transition to home with family       Discharge Needs Assessment    Equipment Currently Used at Home nebulizer    Concerns to be Addressed no discharge needs identified    Equipment Needed After Discharge none               Discharge Plan     Row Name 04/12/23 1622       Plan    Plan Home    Patient/Family in Agreement with Plan yes    Plan Comments Met with pt at bedside. Introduced self, explained CCP role, facesheet verified. Pt states she lives with spouse and son and is independent with ADLs.  No history of HH or SNF.  Does not currently have PCP, number to [facility] liaison provided.  Plans to return home at discharge, no identified needs.  CCP will follow.  HUSSEIN Tomas RN              Continued Care and Services - Admitted Since 4/11/2023    Coordination has not been started for this encounter.       Expected Discharge Date and Time     Expected Discharge Date Expected Discharge Time    Apr 14, 2023          Demographic Summary     Row Name 04/12/23 1621       General Information    Admission Type inpatient    Arrived From home    Referral Source admission list    Reason for Consult discharge planning    Preferred Language English       Contact Information    Permission Granted to Share Info With family/designee  Neo Cordon (spouse)               Functional Status    No documentation.                 Psychosocial    No documentation.                Abuse/Neglect    No documentation.                Legal    No documentation.                Substance Abuse    No documentation.                Patient Forms    No documentation.                   Sarah Tomas RN

## 2023-04-12 NOTE — H&P
HISTORY AND PHYSICAL   [facility]        Date of Admission: 2023  Patient Identification:  Name: Wilda Mayo  Age: 37 y.o.  Sex: female  :  1985  MRN: [id number]                     Primary Care Physician: Provider, No Known    Chief Complaint:  37 year old female who presented to the emergency room with chest pain which started two days ago; the pain is on the right side; she denies shortness of breath; no fever or chills; the pain radiates to her right neck and shoulder; she has a history of asthma and is a nonsmoker; she has had a nonproductive cough    History of Present Illness:   As above    Past Medical History:  Past Medical History:   Diagnosis Date   • Asthma      Past Surgical History:  History reviewed. No pertinent surgical history.   Home Meds:  Medications Prior to Admission   Medication Sig Dispense Refill Last Dose   • albuterol (PROVENTIL) (2.5 MG/3ML) 0.083% nebulizer solution Take 2.5 mg by nebulization Every 4 (Four) Hours As Needed for Wheezing or Shortness of Air. 90 mL 0 Past Month   • albuterol sulfate  (90 Base) MCG/ACT inhaler Inhale 2 puffs Every 4 (Four) Hours As Needed for Wheezing.   Past Month   • Fluticasone-Salmeterol (ADVAIR/WIXELA) 250-50 MCG/ACT DISKUS Inhale 2 (Two) Times a Day.   Past Month       Allergies:  No Known Allergies  Immunizations:    There is no immunization history on file for this patient.  Social History:   Social History     Social History Narrative   • Not on file     Social History     Socioeconomic History   • Marital status:    Tobacco Use   • Smoking status: Never     Passive exposure: Never   • Smokeless tobacco: Never   Vaping Use   • Vaping Use: Never used   Substance and Sexual Activity   • Alcohol use: Defer   • Drug use: Not Currently   • Sexual activity: Never       Family History:  History reviewed. No pertinent family history.     Review of Systems  See history of present illness and past medical  "history.  Patient denies headache, dizziness, syncope, falls, trauma, change in vision, change in hearing, change in taste, changes in weight, changes in appetite, focal weakness, numbness, or paresthesia.  Patient denies  palpitations, dyspnea, orthopnea, PND sinus pressure, rhinorrhea, epistaxis, hemoptysis, nausea, vomiting,hematemesis, diarrhea, constipation or hematchezia.  Denies cold or heat intolerance, polydipsia, polyuria, polyphagia. Denies hematuria, pyuria, dysuria, hesitancy, frequency or urgency. Denies consumption of raw and under cooked meats foods or change in water source.  Denies fever, chills, sweats, night sweats.  Denies missing any routine medications. Remainder of ROS is negative.    Objective:  T Max 24 hrs: Temp (24hrs), Av.3 °F (37.4 °C), Min:98.7 °F (37.1 °C), Max:99.9 °F (37.7 °C)    Vitals Ranges:   Temp:  [98.7 °F (37.1 °C)-99.9 °F (37.7 °C)] 99.9 °F (37.7 °C)  Heart Rate:  [] 110  Resp:  [16-20] 16  BP: (101-111)/(70-80) 108/80      Exam:  /80   Pulse 110   Temp 99.9 °F (37.7 °C)   Resp 16   Ht 175.3 cm (69.02\")   Wt 70.3 kg (155 lb)   LMP 2023 (Exact Date)   SpO2 96%   BMI 22.88 kg/m²     General Appearance:    Alert, cooperative, no distress, appears stated age   Head:    Normocephalic, without obvious abnormality, atraumatic   Eyes:    PERRL, conjunctivae/corneas clear, EOM's intact, both eyes   Ears:    Normal external ear canals, both ears   Nose:   Nares normal, septum midline, mucosa normal, no drainage    or sinus tenderness   Throat:   Lips, mucosa, and tongue normal   Neck:   Supple, symmetrical, trachea midline, no adenopathy;     thyroid:  no enlargement/tenderness/nodules; no carotid    bruit or JVD   Back:     Symmetric, no curvature, ROM normal, no CVA tenderness   Lungs:     Clear to auscultation bilaterally, respirations unlabored   Chest Wall:    No tenderness or deformity    Heart:    Regular rate and rhythm, S1 and S2 normal, no " murmur, rub   or gallop   Abdomen:     Soft, nontender, bowel sounds active all four quadrants,     no masses, no hepatomegaly, no splenomegaly   Extremities:   Extremities normal, atraumatic, no cyanosis or edema   Pulses:   2+ and symmetric all extremities   Skin:   Skin color, texture, turgor normal, no rashes or lesions               .    Data Review:  Labs in chart were reviewed.  WBC   Date Value Ref Range Status   04/11/2023 13.39 (H) 3.40 - 10.80 10*3/mm3 Final     Hemoglobin   Date Value Ref Range Status   04/11/2023 15.2 12.0 - 15.9 g/dL Final     Hematocrit   Date Value Ref Range Status   04/11/2023 48.5 (H) 34.0 - 46.6 % Final     Platelets   Date Value Ref Range Status   04/11/2023 466 (H) 140 - 450 10*3/mm3 Final     Sodium   Date Value Ref Range Status   04/11/2023 140 136 - 145 mmol/L Final     Potassium   Date Value Ref Range Status   04/11/2023 4.1 3.5 - 5.2 mmol/L Final     Chloride   Date Value Ref Range Status   04/11/2023 101 98 - 107 mmol/L Final     CO2   Date Value Ref Range Status   04/11/2023 27.1 22.0 - 29.0 mmol/L Final     BUN   Date Value Ref Range Status   04/11/2023 9 6 - 20 mg/dL Final     Creatinine   Date Value Ref Range Status   04/11/2023 0.71 0.57 - 1.00 mg/dL Final     Glucose   Date Value Ref Range Status   04/11/2023 101 (H) 65 - 99 mg/dL Final     Calcium   Date Value Ref Range Status   04/11/2023 10.0 8.6 - 10.5 mg/dL Final       Results from last 7 days   Lab Units 04/11/23  1145   TSH uIU/mL 1.860   FREE T4 ng/dL 1.06          Imaging Results (All)     Procedure Component Value Units Date/Time    CT Angiogram Chest Pulmonary Embolism [[id number]] Collected: 04/11/23 1536     Updated: 04/11/23 2025    Addenda:        ADDENDUM: Given the normal appearance of the esophagram, an alternative  diagnosis in this patient includes an infected paratracheal air cyst and  that should also be considered in the differential diagnosis.     This report was finalized on 4/11/2023 8:22 PM  by Dr. Hammad Castillo M.D.     Signed: 04/11/23 2022 by Hammad Castillo MD    Narrative:      CT ANGIOGRAM CHEST WITH IV CONTRAST     HISTORY: 47-year-old female with chest pain and cough. Pain is localized  to the right upper chest and right scapular region.     TECHNIQUE: CT angiogram chest includes axial imaging from the thoracic  inlet to the upper abdomen with IV contrast and data reconstructed in  coronal and sagittal planes and 3-dimensional volume rendering was  performed.     FINDINGS: Beginning posterior to the lower pole of the right thyroid  gland and adjacent to the right aspect of the esophagus and posterior to  the right aspect of the trachea there is abnormal air that measures  approximately 2.1 x 1.9 cm. This is centered at the level of the  thoracic inlet and there is surrounding inflammation with soft tissue  thickening and stranding within the fat. The trachea is displaced  anteriorly and to the left and the lower pole of the thyroid gland is  displaced anteriorly. There is stranding/inflammation within the  posterior mediastinal fat that also extends caudally posterior to the  trachea and into the subcarinal region. There appears to be esophageal  wall thickening.     There is no intrapulmonary arterial filling defect to diagnose a  pulmonary embolus. Lungs appear clear and there is no infiltrate or  pleural effusion.       Impression:      1. Abnormal gas collection to the right of the esophagus at the level of  the thoracic inlet with surrounding soft tissue inflammation that  extends into the posterior mediastinum and adjacent to the esophagus.  There is also soft tissue wall thickening. Findings are suspicious for  esophageal perforation with abscess and mediastinal inflammation.  2. No evidence for pulmonary thromboembolic disease.     Findings discussed with Dr. Dickerson in the emergency department  04/11/2023 at 3:30 PM.      Radiation dose reduction techniques were utilized,  including automated  exposure control and exposure modulation based on body size.     This report was finalized on 4/11/2023 7:35 PM by Dr. Hammad Castillo M.D.       FL Esophagram Complete Single Contrast [[id number]] Collected: 04/11/23 1832     Updated: 04/11/23 1938    Narrative:      SINGLE CONTRAST GASTROGRAFIN SWALLOWING EXAM     HISTORY: 37-year-old female with chest pain and abnormal paraesophageal  gas at the level of the thoracic inlet on CT.     TECHNIQUE AND FINDINGS: Initially, the patient swallowed Gastrografin in  an upright position in various projections. Exam demonstrates the right  paraesophageal fluid collection adjacent to the esophagus at the level  of the thoracic inlet. There is no extension of contrast into this  collection. The esophagus exhibits normal configuration and there is no  delay in passage of contrast or esophageal mucosal abnormality or  mechanical obstruction. Patient was placed in an VELAZCO and drank more  water-soluble contrast material and there is no evidence for  extravasation of contrast from the esophagus.       Impression:      There is no evidence for esophageal leak at this time. No  water-soluble contrast material was demonstrated extending into the  abnormal right paraesophageal air collection at the level of the  thoracic inlet.     FLUOROSCOPY TIME: 2 minutes 52 seconds, 98 images.     This report was finalized on 4/11/2023 7:34 PM by Dr. Hammad Castillo M.D.       CT Soft Tissue Neck With Contrast [[id number]] Collected: 04/11/23 1855     Updated: 04/11/23 1931    Narrative:      CT SCAN OF THE SOFT TISSUES OF THE NECK WITH CONTRAST     CLINICAL HISTORY: Chest pain for a couple of days.     TECHNIQUE: CT scan of the soft tissues of the neck was obtained with 3  mm axial images following the administration of IV contrast. Sagittal  and coronal reconstructed images were obtained.     FINDINGS:     There is a complex collection seen along the right aspect of  the  proximal cervical esophagus that contains both air as well as fluid and  soft tissue enhancing foci. This collection extends into the visualized  superior mediastinum where there is inflammation of the fat. The  collection measures up to approximately 3.1 x 2.5 cm in greatest axial  dimensions. The findings are highly suspicious for an inflammatory  collection secondary to a contained esophageal perforation. This  inflammatory change extends to the level of the innominate artery and  circumscribes the proximal aspect of the right common carotid artery.     The visualized contents of the cranial vault are within normal limits.  The orbits are unremarkable. The parotid glands, submandibular glands,  and sublingual glands are within normal limits. The oral cavity and  tongue are within normal limits. The epiglottis, aryepiglottic folds,  and true vocal cords are unremarkable in appearance. The subglottic  airway as well as proximal trachea are deviated slightly anteriorly into  the left. The visualized lung apices are clear.     Extensive changes of inflammatory paranasal sinus disease are seen with  complete opacification of the visualized frontal sinus as well as  ethmoid air cells. There is subtotal opacification of the maxillary  sinuses with mucosal thickening as well as air-fluid levels. Air-fluid  levels are additionally seen within the sphenoid sinus.       Impression:         There is a complex collection seen along the right aspect of the  posterior cervical esophagus that contains air, fluid as well as  enhancing soft tissue foci. This collection extends into the level of  the visualized superior mediastinum where there is inflammation of the  fat. Again, the collection measures up to 3.1 x 2.5 cm in greatest axial  dimensions and extends to the innominate artery and circumscribes the  proximal aspect of the right common carotid artery. The findings are  highly suspicious for an inflammatory collection  secondary to a  contained esophageal perforation.     Extensive changes of inflammatory paranasal sinus disease are  incidentally noted as discussed in detail above.     These findings were discussed with Dr. Hemanth La on 04/11/2023 at  approximately 7:10 PM.     Radiation dose reduction techniques were utilized, including automated  exposure control and exposure modulation based on body size.     This report was finalized on 4/11/2023 7:28 PM by Dr. Alonso Morris M.D.       XR Chest 2 View [[id number]] Collected: 04/11/23 1306     Updated: 04/11/23 1309    Narrative:      XR CHEST 2 VW-     HISTORY: Female who is 37 years-old,  chest pain     TECHNIQUE: Frontal and lateral views of the chest     COMPARISON: None available     FINDINGS: Heart, mediastinum and pulmonary vasculature are unremarkable.  No focal pulmonary consolidation, pleural effusion, or pneumothorax. No  acute osseous process.       Impression:      No evidence for acute pulmonary process. Follow-up as  clinical indications persist.     This report was finalized on 4/11/2023 1:06 PM by Dr. Marshal Vazquez M.D.               Assessment:  Active Hospital Problems    Diagnosis  POA   • **Paratracheal gas collection [R93.89]  Yes      Resolved Hospital Problems   No resolved problems to display.   chest pain  paraesophageal abscess  asthma    Plan:  Patient has been taken to the OR for drainage of an abscess  No extravasation seen on the esophagram  Trend labs  Prn mininebs  Continue zosyn  D.w patient and ED provider  Thoracic surgery input noted and appreciated    Denise Borja MD  4/11/2023  20:39 EDT

## 2023-04-13 ENCOUNTER — APPOINTMENT (OUTPATIENT)
Dept: GENERAL RADIOLOGY | Facility: HOSPITAL | Age: 38
DRG: 981 | End: 2023-04-13
Payer: COMMERCIAL

## 2023-04-13 VITALS
WEIGHT: 155 LBS | DIASTOLIC BLOOD PRESSURE: 68 MMHG | SYSTOLIC BLOOD PRESSURE: 104 MMHG | RESPIRATION RATE: 17 BRPM | HEART RATE: 91 BPM | OXYGEN SATURATION: 96 % | BODY MASS INDEX: 22.96 KG/M2 | HEIGHT: 69 IN | TEMPERATURE: 97.4 F

## 2023-04-13 LAB
ANION GAP SERPL CALCULATED.3IONS-SCNC: 5.8 MMOL/L (ref 5–15)
BASOPHILS # BLD AUTO: 0.06 10*3/MM3 (ref 0–0.2)
BASOPHILS NFR BLD AUTO: 0.5 % (ref 0–1.5)
BUN SERPL-MCNC: 11 MG/DL (ref 6–20)
BUN/CREAT SERPL: 17.2 (ref 7–25)
CALCIUM SPEC-SCNC: 8.5 MG/DL (ref 8.6–10.5)
CHLORIDE SERPL-SCNC: 108 MMOL/L (ref 98–107)
CO2 SERPL-SCNC: 26.2 MMOL/L (ref 22–29)
CREAT SERPL-MCNC: 0.64 MG/DL (ref 0.57–1)
DEPRECATED RDW RBC AUTO: 40 FL (ref 37–54)
EGFRCR SERPLBLD CKD-EPI 2021: 118.4 ML/MIN/1.73
EOSINOPHIL # BLD AUTO: 0.34 10*3/MM3 (ref 0–0.4)
EOSINOPHIL NFR BLD AUTO: 2.7 % (ref 0.3–6.2)
ERYTHROCYTE [DISTWIDTH] IN BLOOD BY AUTOMATED COUNT: 12.1 % (ref 12.3–15.4)
GLUCOSE SERPL-MCNC: 91 MG/DL (ref 65–99)
HCT VFR BLD AUTO: 34.5 % (ref 34–46.6)
HGB BLD-MCNC: 11.5 G/DL (ref 12–15.9)
IMM GRANULOCYTES # BLD AUTO: 0.06 10*3/MM3 (ref 0–0.05)
IMM GRANULOCYTES NFR BLD AUTO: 0.5 % (ref 0–0.5)
LAB AP CASE REPORT: NORMAL
LYMPHOCYTES # BLD AUTO: 3.66 10*3/MM3 (ref 0.7–3.1)
LYMPHOCYTES NFR BLD AUTO: 28.8 % (ref 19.6–45.3)
MCH RBC QN AUTO: 30.4 PG (ref 26.6–33)
MCHC RBC AUTO-ENTMCNC: 33.3 G/DL (ref 31.5–35.7)
MCV RBC AUTO: 91.3 FL (ref 79–97)
MONOCYTES # BLD AUTO: 0.86 10*3/MM3 (ref 0.1–0.9)
MONOCYTES NFR BLD AUTO: 6.8 % (ref 5–12)
NEUTROPHILS NFR BLD AUTO: 60.7 % (ref 42.7–76)
NEUTROPHILS NFR BLD AUTO: 7.72 10*3/MM3 (ref 1.7–7)
NRBC BLD AUTO-RTO: 0 /100 WBC (ref 0–0.2)
PATH REPORT.FINAL DX SPEC: NORMAL
PATH REPORT.GROSS SPEC: NORMAL
PLATELET # BLD AUTO: 324 10*3/MM3 (ref 140–450)
PMV BLD AUTO: 10.1 FL (ref 6–12)
POTASSIUM SERPL-SCNC: 3.6 MMOL/L (ref 3.5–5.2)
RBC # BLD AUTO: 3.78 10*6/MM3 (ref 3.77–5.28)
SODIUM SERPL-SCNC: 140 MMOL/L (ref 136–145)
WBC NRBC COR # BLD: 12.7 10*3/MM3 (ref 3.4–10.8)

## 2023-04-13 PROCEDURE — 85025 COMPLETE CBC W/AUTO DIFF WBC: CPT | Performed by: STUDENT IN AN ORGANIZED HEALTH CARE EDUCATION/TRAINING PROGRAM

## 2023-04-13 PROCEDURE — 25010000002 PIPERACILLIN SOD-TAZOBACTAM PER 1 G: Performed by: SURGERY

## 2023-04-13 PROCEDURE — 94664 DEMO&/EVAL PT USE INHALER: CPT

## 2023-04-13 PROCEDURE — 71045 X-RAY EXAM CHEST 1 VIEW: CPT

## 2023-04-13 PROCEDURE — 94799 UNLISTED PULMONARY SVC/PX: CPT

## 2023-04-13 PROCEDURE — 94761 N-INVAS EAR/PLS OXIMETRY MLT: CPT

## 2023-04-13 PROCEDURE — 80048 BASIC METABOLIC PNL TOTAL CA: CPT | Performed by: STUDENT IN AN ORGANIZED HEALTH CARE EDUCATION/TRAINING PROGRAM

## 2023-04-13 PROCEDURE — 99232 SBSQ HOSP IP/OBS MODERATE 35: CPT | Performed by: INTERNAL MEDICINE

## 2023-04-13 PROCEDURE — 94760 N-INVAS EAR/PLS OXIMETRY 1: CPT

## 2023-04-13 RX ORDER — AMOXICILLIN AND CLAVULANATE POTASSIUM 875; 125 MG/1; MG/1
1 TABLET, FILM COATED ORAL EVERY 12 HOURS SCHEDULED
Status: DISCONTINUED | OUTPATIENT
Start: 2023-04-13 | End: 2023-04-13 | Stop reason: HOSPADM

## 2023-04-13 RX ORDER — AMOXICILLIN AND CLAVULANATE POTASSIUM 875; 125 MG/1; MG/1
1 TABLET, FILM COATED ORAL EVERY 12 HOURS SCHEDULED
Qty: 10 TABLET | Refills: 0 | Status: SHIPPED | OUTPATIENT
Start: 2023-04-13 | End: 2023-04-18

## 2023-04-13 RX ORDER — ACETAMINOPHEN 500 MG
1000 TABLET ORAL EVERY 8 HOURS PRN
Qty: 30 TABLET | Refills: 0 | Status: SHIPPED | OUTPATIENT
Start: 2023-04-13 | End: 2023-04-18

## 2023-04-13 RX ADMIN — ACETAMINOPHEN 1000 MG: 500 TABLET, FILM COATED ORAL at 08:47

## 2023-04-13 RX ADMIN — TAZOBACTAM SODIUM AND PIPERACILLIN SODIUM 3.38 G: 375; 3 INJECTION, SOLUTION INTRAVENOUS at 04:17

## 2023-04-13 RX ADMIN — DOCUSATE SODIUM 100 MG: 100 CAPSULE, LIQUID FILLED ORAL at 08:47

## 2023-04-13 RX ADMIN — BUDESONIDE AND FORMOTEROL FUMARATE DIHYDRATE 2 PUFF: 160; 4.5 AEROSOL RESPIRATORY (INHALATION) at 08:08

## 2023-04-13 NOTE — PROGRESS NOTES
ID note for mediastinitis    Subjective: She says she is feeling okay.  No significant pain.  No fevers or chills or night sweats    Objective:   Temp:  [97.4 °F (36.3 °C)-99.4 °F (37.4 °C)] 97.4 °F (36.3 °C)  Heart Rate:  [62-91] 91  Resp:  [14-18] 17  BP: ()/(61-69) 104/68  No acute distress, right-sided drain removed with no significant erythema or induration or swelling      White count 12.7  Creatinine 0.64  Blood cultures no growth to date    A/p  1.  Mediastinitis:  2.  Leukocytosis, improved.  Secondary to #1 as well as steroids    Patient's white count is improved today.  I think this is multifactorial related to infection, dexamethasone administration and stress reaction from surgery.  Overall she seems to be doing very well.  I will switch the Zosyn to a short course of Augmentin.  Thanks to thoracic surgery for aggressive surgical debridement and source control.       No objection to discharge when okay with others.

## 2023-04-13 NOTE — PLAN OF CARE
Goal Outcome Evaluation:  Plan of Care Reviewed With: patient        Progress: no change  Outcome Evaluation: VSS. Alert and oriented x4. NASEEM drain to bulb suction. Incision C/D/I. Up with stand-by assist. IV abx per order. Pt c/o minimal pain this shift. managed with scheduled tylenol. Will continue to provide supportive care.

## 2023-04-13 NOTE — DISCHARGE SUMMARY
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        Hospital Course     Ms. Farida Mayo is a 37 y.o. female with a history of asthma who presented to [facility] initially complaining of chest pain and right-sided neck pain.  Please see the admitting history and physical for further details.  She was found to have superior mediastinitis, neck lipoma and was admitted to the hospital for further evaluation and treatment.  CT surgery consulted on admission for additional management recommendations.  He was taken to the OR on  for right neck exploration and excision of periparotid neck mass which appeared to be a lipoma.  Per cardiothoracic surgery the patient may have had a subclinical hypopharyngeal perforation that may have sealed but caused inflammation into the deep cervical space and superior mediastinitis.  Infectious disease was consulted for antibiotic recommendations, on admission she was initiated on broad-spectrum IV antibiotics which was continued throughout her hospital stay.  At discharge she will transition to Augmentin.  She will follow-up with cardiothoracic surgery in a week.  Radiology is recommending short-term CT follow-up in 3 to 4 weeks for additional follow-up of inflammation.  The patient does not have a PCP but she was provided with a list of providers who are currently accepting new patients and she was instructed to make a new patient  appointment as soon as possible.  This was discussed with her on the day of discharge.    Day of Discharge     Subjective:  Resting in bed, NASEEM drain has been removed and she feels well.  She is looking forward to going home    Physical Exam:  Temp:  [97.4 °F (36.3 °C)-98.8 °F (37.1 °C)] 97.4 °F (36.3 °C)  Heart Rate:  [62-91] 91  Resp:  [16-18] 17  BP: ()/(68-69) 104/68  Body mass index is 22.88 kg/m².  Physical Exam  Constitutional:       General: She is not in acute distress.     Appearance: Normal appearance. She is not ill-appearing.   Neck:      Comments: NASEEM drain removed  Cardiovascular:      Rate and Rhythm: Normal rate and regular rhythm.   Pulmonary:      Effort: Pulmonary effort is normal. No respiratory distress.      Breath sounds: Normal breath sounds. No wheezing.   Abdominal:      General: Abdomen is flat. Bowel sounds are normal. There is no distension.      Palpations: Abdomen is soft.   Musculoskeletal:         General: No swelling or tenderness.      Right lower leg: No edema.      Left lower leg: No edema.   Skin:     General: Skin is warm and dry.   Neurological:      General: No focal deficit present.      Mental Status: She is alert and oriented to person, place, and time. Mental status is at baseline.         Consultants     Consult Orders (all) (From admission, onward)     Start     Ordered    04/12/23 0904  Inpatient Infectious Diseases Consult  Once        Specialty:  Infectious Diseases  Provider:  Suresh Saldivar MD    04/12/23 0904    04/11/23 1532  Inpatient Thoracic Surgery Consult  STAT        Specialty:  Thoracic Surgery  Provider:  Hemanth La MD    04/11/23 1531    04/11/23 1532  [facility] (on-call MD unless specified) Details  Once,   Status:  Canceled        Specialty:  Hospitalist  Provider:  Denise Borja MD    04/11/23 1531              Procedures     Imaging Results (All)     Procedure Component Value Units Date/Time    XR Chest 1 View [[id number]]  Collected: 04/13/23 0747     Updated: 04/13/23 0752    Narrative:      XR CHEST 1 VW-  04/13/2023     HISTORY: Postop lung surgery.     Heart size is within normal limits. Lungs are relatively well expanded  and appear clear. No significant pneumothorax is seen. Bony structures  appear unremarkable.       Impression:      1. No acute process.     This report was finalized on 4/13/2023 7:48 AM by Dr. Reed Wharton M.D.       CT Soft Tissue Neck With Contrast [[id number]] Collected: 04/11/23 1855     Updated: 04/12/23 0928    Addenda:        ADDENDUM #2: The possibility of an underlying lesion such as a  malignancy at this site cannot be entirely occluded, and I would  strongly recommend follow-up CT imaging of the neck until complete  resolution of these areas of abnormality. A reasonable interval for the  first follow-up exam would be approximately 3-4 weeks. These findings  and recommendations were directly discussed the hospitalist caring for  this patient, Dr. Xochilt Livingston MD, on 04/12/2023 at approximately  9:00 AM.     This report was finalized on 4/12/2023 9:25 AM by Dr. Alonso Morris M.D.     Signed: 04/12/23 0925 by Alonso Morris MD            ADDENDUM: Another differential diagnostic possibility for the infected  appearing collection is an infected paratracheal air cyst. This  additional differential diagnostic consideration was discussed with Dr. La at approximately 8:00 PM.     This report was finalized on 4/11/2023 9:12 PM by Dr. Alonso Morris M.D.     Signed: 04/11/23 2112 by Alonso Morris MD    Narrative:      CT SCAN OF THE SOFT TISSUES OF THE NECK WITH CONTRAST     CLINICAL HISTORY: Chest pain for a couple of days.     TECHNIQUE: CT scan of the soft tissues of the neck was obtained with 3  mm axial images following the administration of IV contrast. Sagittal  and coronal reconstructed images were obtained.     FINDINGS:     There is a complex collection seen along the right aspect of  the  proximal cervical esophagus that contains both air as well as fluid and  soft tissue enhancing foci. This collection extends into the visualized  superior mediastinum where there is inflammation of the fat. The  collection measures up to approximately 3.1 x 2.5 cm in greatest axial  dimensions. The findings are highly suspicious for an inflammatory  collection secondary to a contained esophageal perforation. This  inflammatory change extends to the level of the innominate artery and  circumscribes the proximal aspect of the right common carotid artery.     The visualized contents of the cranial vault are within normal limits.  The orbits are unremarkable. The parotid glands, submandibular glands,  and sublingual glands are within normal limits. The oral cavity and  tongue are within normal limits. The epiglottis, aryepiglottic folds,  and true vocal cords are unremarkable in appearance. The subglottic  airway as well as proximal trachea are deviated slightly anteriorly into  the left. The visualized lung apices are clear.     Extensive changes of inflammatory paranasal sinus disease are seen with  complete opacification of the visualized frontal sinus as well as  ethmoid air cells. There is subtotal opacification of the maxillary  sinuses with mucosal thickening as well as air-fluid levels. Air-fluid  levels are additionally seen within the sphenoid sinus.       Impression:         There is a complex collection seen along the right aspect of the  posterior cervical esophagus that contains air, fluid as well as  enhancing soft tissue foci. This collection extends into the level of  the visualized superior mediastinum where there is inflammation of the  fat. Again, the collection measures up to 3.1 x 2.5 cm in greatest axial  dimensions and extends to the innominate artery and circumscribes the  proximal aspect of the right common carotid artery. The findings are  highly suspicious for an inflammatory collection  secondary to a  contained esophageal perforation.     Extensive changes of inflammatory paranasal sinus disease are  incidentally noted as discussed in detail above.     These findings were discussed with Dr. Hemanth La on 04/11/2023 at  approximately 7:10 PM.     Radiation dose reduction techniques were utilized, including automated  exposure control and exposure modulation based on body size.     This report was finalized on 4/11/2023 7:28 PM by Dr. Alonso Morris M.D.       XR Chest 1 View [[id number]] Collected: 04/12/23 0727     Updated: 04/12/23 0733    Narrative:      PORTABLE CHEST X-RAY     HISTORY: Postop neck exploration and mass excision yesterday.     Portable chest x-ray is provided. Correlation: Chest x-ray and CT scans  04/11/2023.     FINDINGS: There is a soft tissue drain in the upper right  mediastinum/neck base. The cardiomediastinal silhouette is normal. The  lungs are clear. The costophrenic sulci are dry and the bones appear  normal. There is no pneumothorax.       Impression:      Negative.     This report was finalized on 4/12/2023 7:30 AM by Dr. Chin Baron M.D.       XR Chest 1 View [[id number]] Collected: 04/11/23 2336     Updated: 04/11/23 2341    Narrative:      STAT PORTABLE RADIOGRAPHIC VIEW THE CHEST     CLINICAL HISTORY: Postop lung surgery.     FINDINGS:     The lungs remain clear of acute infiltrates. The cardiac mediastinal  silhouette is remarkable for an tracheal deviation to the left as seen  on the preoperative examination secondary to a fluid collection noted on  the preoperative CT study. The osseous structures are unremarkable.     This report was finalized on 4/11/2023 11:38 PM by Dr. Alonso Morris M.D.       CT Angiogram Chest Pulmonary Embolism [[id number]] Collected: 04/11/23 1536     Updated: 04/11/23 2025    Addenda:        ADDENDUM: Given the normal appearance of the esophagram, an alternative  diagnosis in this patient includes an infected paratracheal air cyst  and  that should also be considered in the differential diagnosis.     This report was finalized on 4/11/2023 8:22 PM by Dr. Hammad Castillo M.D.     Signed: 04/11/23 2022 by Hammad Castillo MD    Narrative:      CT ANGIOGRAM CHEST WITH IV CONTRAST     HISTORY: 47-year-old female with chest pain and cough. Pain is localized  to the right upper chest and right scapular region.     TECHNIQUE: CT angiogram chest includes axial imaging from the thoracic  inlet to the upper abdomen with IV contrast and data reconstructed in  coronal and sagittal planes and 3-dimensional volume rendering was  performed.     FINDINGS: Beginning posterior to the lower pole of the right thyroid  gland and adjacent to the right aspect of the esophagus and posterior to  the right aspect of the trachea there is abnormal air that measures  approximately 2.1 x 1.9 cm. This is centered at the level of the  thoracic inlet and there is surrounding inflammation with soft tissue  thickening and stranding within the fat. The trachea is displaced  anteriorly and to the left and the lower pole of the thyroid gland is  displaced anteriorly. There is stranding/inflammation within the  posterior mediastinal fat that also extends caudally posterior to the  trachea and into the subcarinal region. There appears to be esophageal  wall thickening.     There is no intrapulmonary arterial filling defect to diagnose a  pulmonary embolus. Lungs appear clear and there is no infiltrate or  pleural effusion.       Impression:      1. Abnormal gas collection to the right of the esophagus at the level of  the thoracic inlet with surrounding soft tissue inflammation that  extends into the posterior mediastinum and adjacent to the esophagus.  There is also soft tissue wall thickening. Findings are suspicious for  esophageal perforation with abscess and mediastinal inflammation.  2. No evidence for pulmonary thromboembolic disease.     Findings discussed with   Tuan in the emergency department  04/11/2023 at 3:30 PM.      Radiation dose reduction techniques were utilized, including automated  exposure control and exposure modulation based on body size.     This report was finalized on 4/11/2023 7:35 PM by Dr. Hammad Castillo M.D.       FL Esophagram Complete Single Contrast [[id number]] Collected: 04/11/23 1832     Updated: 04/11/23 1938    Narrative:      SINGLE CONTRAST GASTROGRAFIN SWALLOWING EXAM     HISTORY: 37-year-old female with chest pain and abnormal paraesophageal  gas at the level of the thoracic inlet on CT.     TECHNIQUE AND FINDINGS: Initially, the patient swallowed Gastrografin in  an upright position in various projections. Exam demonstrates the right  paraesophageal fluid collection adjacent to the esophagus at the level  of the thoracic inlet. There is no extension of contrast into this  collection. The esophagus exhibits normal configuration and there is no  delay in passage of contrast or esophageal mucosal abnormality or  mechanical obstruction. Patient was placed in an VELAZCO and drank more  water-soluble contrast material and there is no evidence for  extravasation of contrast from the esophagus.       Impression:      There is no evidence for esophageal leak at this time. No  water-soluble contrast material was demonstrated extending into the  abnormal right paraesophageal air collection at the level of the  thoracic inlet.     FLUOROSCOPY TIME: 2 minutes 52 seconds, 98 images.     This report was finalized on 4/11/2023 7:34 PM by Dr. Hammad Castillo M.D.       XR Chest 2 View [[id number]] Collected: 04/11/23 1306     Updated: 04/11/23 1309    Narrative:      XR CHEST 2 VW-     HISTORY: Female who is 37 years-old,  chest pain     TECHNIQUE: Frontal and lateral views of the chest     COMPARISON: None available     FINDINGS: Heart, mediastinum and pulmonary vasculature are unremarkable.  No focal pulmonary consolidation, pleural effusion, or  pneumothorax. No  acute osseous process.       Impression:      No evidence for acute pulmonary process. Follow-up as  clinical indications persist.     This report was finalized on 4/11/2023 1:06 PM by Dr. Marshal Vazquez M.D.             Pertinent Labs     Results from last 7 days   Lab Units 04/13/23  0530 04/12/23  0535 04/11/23  1145   WBC 10*3/mm3 12.70* 14.93* 13.39*   HEMOGLOBIN g/dL 11.5* 12.6 15.2   PLATELETS 10*3/mm3 324 348 466*     Results from last 7 days   Lab Units 04/13/23  0530 04/11/23  1145   SODIUM mmol/L 140 140   POTASSIUM mmol/L 3.6 4.1   CHLORIDE mmol/L 108* 101   CO2 mmol/L 26.2 27.1   BUN mg/dL 11 9   CREATININE mg/dL 0.64 0.71   GLUCOSE mg/dL 91 101*   Estimated Creatinine Clearance: 136.2 mL/min (by C-G formula based on SCr of 0.64 mg/dL).  Results from last 7 days   Lab Units 04/11/23  1145   ALBUMIN g/dL 4.7   BILIRUBIN mg/dL 0.5   ALK PHOS U/L 75   AST (SGOT) U/L 12   ALT (SGPT) U/L 13     Results from last 7 days   Lab Units 04/13/23  0530 04/11/23  1145   CALCIUM mg/dL 8.5* 10.0   ALBUMIN g/dL  --  4.7       Results from last 7 days   Lab Units 04/11/23  1322 04/11/23  1145   HSTROP T ng/L <6 <6           Invalid input(s): LDLCALC  Results from last 7 days   Lab Units 04/11/23  1551 04/11/23  1548   BLOODCX  No growth at 2 days No growth at 2 days       Test Results Pending at Discharge     Pending Labs     Order Current Status    Blood Culture - Blood, Arm, Left Preliminary result    Blood Culture - Blood, Arm, Right Preliminary result          Discharge Details        Discharge Medications      New Medications      Instructions Start Date   Acetaminophen Extra Strength 500 MG tablet  Generic drug: acetaminophen   1,000 mg, Oral, Every 8 Hours PRN      amoxicillin-clavulanate 875-125 MG per tablet  Commonly known as: AUGMENTIN   Take 1 tablet by mouth Every 12 (Twelve) Hours for 10 doses.      phenol 1.4 % liquid liquid  Commonly known as: CHLORASEPTIC   1 spray, Mouth/Throat,  Every 2 Hours PRN         Continue These Medications      Instructions Start Date   albuterol sulfate  (90 Base) MCG/ACT inhaler  Commonly known as: PROVENTIL HFA;VENTOLIN HFA;PROAIR HFA   2 puffs, Inhalation, Every 4 Hours PRN      albuterol (2.5 MG/3ML) 0.083% nebulizer solution  Commonly known as: PROVENTIL   2.5 mg, Nebulization, Every 4 Hours PRN      Fluticasone-Salmeterol 250-50 MCG/ACT DISKUS  Commonly known as: ADVAIR/WIXELA   Inhalation, 2 Times Daily - RT             Allergies   Allergen Reactions   • Vancomycin Rash         Discharge Disposition:  Home or Self Care    Discharge Diet:  No active diet order      Discharge Activity:   Activity Instructions     Other Activity Instructions      Activity Instructions: per surgery          CODE STATUS:    Code Status and Medical Interventions:   Ordered at: 04/11/23 2341     Code Status (Patient has no pulse and is not breathing):    CPR (Attempt to Resuscitate)     Medical Interventions (Patient has pulse or is breathing):    Full Support     Release to patient:    Routine Release       Future Appointments   Date Time Provider Department Center   5/2/2023  1:30 PM [facility] CT 1  [facility] CT [facility]   5/18/2023  9:15 AM Hemanth La MD [facility]     Additional Instructions for the Follow-ups that You Need to Schedule     Discharge Follow-up with PCP   As directed       Currently Documented PCP:    Provider, No Known    PCP Phone Number:    [phone]     Follow Up Details: call number provided to arrange a post-hospital follow up visit/establish care with a new provider         Discharge Follow-up with Specified Provider: Dr. La in 1 week; 1 Week   As directed      To: Dr. La in 1 week    Follow Up: 1 Week         CT soft tissue neck w contrast   Apr 27, 2023      Pregnant?: No    Release to patient: Routine Release            Follow-up Information     Provider, No Known .    Why: call number provided to arrange a post-hospital follow up  visit/establish care with a new provider  Contact information:  [place] [id number]  [phone]             Hemanth La MD. Schedule an appointment as soon as possible for a visit in 1 week(s).    Specialty: Thoracic Surgery  Contact information:  Shama Stein  [facility] [address]  [facility] [address]  [phone]             Provider, No Known .    Contact information:  [place] [id number]  [phone]                         Additional Instructions for the Follow-ups that You Need to Schedule     Discharge Follow-up with PCP   As directed       Currently Documented PCP:    Provider, No Known    PCP Phone Number:    [phone]     Follow Up Details: call number provided to arrange a post-hospital follow up visit/establish care with a new provider         Discharge Follow-up with Specified Provider: Dr. La in 1 week; 1 Week   As directed      To: Dr. La in 1 week    Follow Up: 1 Week         CT soft tissue neck w contrast   Apr 27, 2023      Pregnant?: No    Release to patient: Routine Release           Time Spent on Discharge:  I spent greater than 30 minutes on this discharge activity which included: face-to-face encounter with the patient, reviewing the data in the system, coordination of the care with the nursing staff as well as consultants, documentation, and entering orders.       Xochilt Livingston MD  [facility]  04/13/23  18:29 EDT

## 2023-04-14 ENCOUNTER — READMISSION MANAGEMENT (OUTPATIENT)
Dept: CALL CENTER | Facility: HOSPITAL | Age: 38
End: 2023-04-14
Payer: COMMERCIAL

## 2023-04-14 NOTE — CASE MANAGEMENT/SOCIAL WORK
Case Management Discharge Note      Final Note: Discharged home         Selected Continued Care - Discharged on 4/13/2023 Admission date: 4/11/2023 - Discharge disposition: Home or Self Care    Destination    No services have been selected for the patient.              Durable Medical Equipment    No services have been selected for the patient.              Dialysis/Infusion    No services have been selected for the patient.              Home Medical Care    No services have been selected for the patient.              Therapy    No services have been selected for the patient.              Community Resources    No services have been selected for the patient.              Community & DME    No services have been selected for the patient.                  Transportation Services  Private: Car    Final Discharge Disposition Code: 01 - home or self-care

## 2023-04-14 NOTE — PROGRESS NOTES
"Enter Query Response Below      Query Response: Yes             If applicable, please update the problem list.       Patient: Wilda Swenson        : 1985  Account: [id number]           Admit Date: 2023        How to Respond to this query:       a. Click New Note     b. Answer query within the yellow box.                c. Update the Problem List, if applicable.      If you have any questions about this query contact me at: [email or web address]     Dr. La,    Based on Medicare billing guidelines [facility] are not allowed to use diagnoses from pathology reports as the sole basis for billing. Any findings noted on a pathology report must be affirmed by the treating physician before billing.    The pathologist reported that the specimen shows:    \"Final Diagnosis -- -- -- [facility] LAB  Result:   1. Soft Tissue, Neck, Excision:                 A. Benign thymic tissue.\"    Is this finding consistent with your clinical impression and treatment plan for this patient?    - Yes  - No  - Other explanation for clinical impression and treatment plan_________  - Clinically indeterminable    By submitting this query, we are merely seeking further clarification of documentation to accurately reflect all conditions that you are monitoring, evaluating, treating or that extend the hospitalization or utilize additional resources of care. Please utilize your independent clinical judgment when addressing the question(s) above.     This query and your response, once completed, will be entered into the legal medical record.    Sincerely,  Esha Boone RN CCDS [facility]  Clinical Documentation Integrity Program   "

## 2023-04-14 NOTE — OUTREACH NOTE
Prep Survey    Flowsheet Row Responses   [facility] facility patient discharged from? [facility]   Is LACE score < 7 ? No   Eligibility Readm Mgmt   Discharge diagnosis paratracheal gas collection,  right neck exploration and excision of periparotid neck mass   Does the patient have one of the following disease processes/diagnoses(primary or secondary)? General Surgery   Does the patient have Home health ordered? No   Is there a DME ordered? No   Prep survey completed? Yes          Ashley TAPIA - Registered Nurse

## 2023-04-16 LAB
BACTERIA SPEC AEROBE CULT: NORMAL
BACTERIA SPEC AEROBE CULT: NORMAL

## 2023-04-18 ENCOUNTER — READMISSION MANAGEMENT (OUTPATIENT)
Dept: CALL CENTER | Facility: HOSPITAL | Age: 38
End: 2023-04-18
Payer: COMMERCIAL

## 2023-04-18 NOTE — OUTREACH NOTE
General Surgery Week 1 Survey    Flowsheet Row Responses   [facility] patient discharged from? [place]   Does the patient have one of the following disease processes/diagnoses(primary or secondary)? General Surgery   Week 1 attempt successful? Yes   Call start time 1533   Call end time 1540   Discharge diagnosis paratracheal gas collection,  right neck exploration and excision of periparotid neck mass   If primary language is not English what is the name and relationship or agency of  used? [place],  [employer] ID # [id number]   Person spoke with today (if not patient) and relationship Patient   Meds reviewed with patient/caregiver? Yes   Does the patient have all medications related to this admission filled (includes all antibiotics, pain medications, etc.) Yes   Is the patient taking all medications as directed (includes completed medication regime)? Yes   Does the patient have a follow up appointment scheduled with their surgeon? Yes  [5/18]   Has the patient kept scheduled appointments due by today? Yes   Comments patient to set up new PCP appt.    Has home health visited the patient within 72 hours of discharge? N/A   Psychosocial issues? No   Did the patient receive a copy of their discharge instructions? Yes   Nursing interventions Reviewed instructions with patient   What is the patient's perception of their health status since discharge? Improving   Nursing interventions Nurse provided patient education   Is the patient /caregiver able to teach back basic post-op care? Keep incision areas clean,dry and protected, No tub bath, swimming, or hot tub until instructed by MD, Take showers only when approved by MD-sponge bathe until then   Is the patient/caregiver able to teach back signs and symptoms of incisional infection? Increased redness, swelling or pain at the incisonal site, Increased drainage or bleeding, Pus or odor from incision, Fever   Is the patient/caregiver able to teach  back steps to recovery at home? Set small, achievable goals for return to baseline health, Rest and rebuild strength, gradually increase activity  [Soft diet for 2 weeks. ]   If the patient is a current smoker, are they able to teach back resources for cessation? Not a smoker   Is the patient/caregiver able to teach back the hierarchy of who to call/visit for symptoms/problems? PCP, Specialist, Home health nurse, Urgent Care, ED, 911 Yes   Week 1 call completed? Yes   Is the patient interested in additional calls from an ambulatory ?  NOTE:  applies to high risk patients requiring additional follow-up. No   Wrap up additional comments Patient reports that she is healing well so far. Following soft diet recommendation. Denies any signs of infection. Patient would like another follow up call next week.           CARLITOS PARRA - Registered Nurse

## 2023-04-18 NOTE — PAYOR COMM NOTE
"Wilda Dunn (37 y.o. Female)     PLEASE SEE ATTACHED FOR DC NOTICE    REF #  [id number]    THANK YOU  GINNY PATEL RN/ DEPT  [facility]   [phone]  [phone]    Date of Birth   1985    Social Security Number       Address   [address] [address]    Home Phone       MRN   [id number]       Sikhism   Non-Latter day    Marital Status                               Admission Date   23    Admission Type   Emergency    Admitting Provider   Xochilt Livingston MD    Attending Provider       Department, Room/Bed   [facility] 5 EAST, E559/1       Discharge Date   2023    Discharge Disposition   Home or Self Care    Discharge Destination                               Attending Provider: (none)   Allergies: Vancomycin    Isolation: None   Infection: None   Code Status: Prior    Ht: 175.3 cm (69.02\")   Wt: 70.3 kg (155 lb)    Admission Cmt: None   Principal Problem: Paratracheal gas collection [R93.89]                 Active Insurance as of 2023     Primary Coverage     Payor Plan Insurance Group Employer/Plan Group    [facility] BLUE [place] ANTH PATHWAY TRANSITION HMO NON PAR [id number]     Payor Plan Address Payor Plan Phone Number Payor Plan Fax Number Effective Dates    [address]   2023 - None Entered    [facility] [address]       Subscriber Name Subscriber Birth Date Member ID       WILDA DUNN 1985 [id number]                 Emergency Contacts      (Rel.) Home Phone Work Phone Mobile Phone    ROSCOE VERDUZCO (Spouse) -- -- [phone]            [place]: [facility] [id number]  Tax ID [id number]     Discharge Summary      Xochilt Livingston MD at 23 1220              Patient Name: Wilda Mayo  : 1985  MRN: [id number]    Date of Admission: 2023  Date of Discharge:  2023  Primary Care Physician: Provider, No Known      Chief Complaint:   Chest Pain and " Cough      Discharge Diagnoses     Active Hospital Problems    Diagnosis  POA   • **Paratracheal gas collection [R93.89]  Yes   • Mediastinitis [J98.51]  Unknown   • Lipoma of neck [D17.0]  Unknown   • 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        Hospital Course     Ms. Farida Mayo is a 37 y.o. female with a history of asthma who presented to [facility] initially complaining of chest pain and right-sided neck pain.  Please see the admitting history and physical for further details.  She was found to have superior mediastinitis, neck lipoma and was admitted to the hospital for further evaluation and treatment.  CT surgery consulted on admission for additional management recommendations.  He was taken to the OR on 4/11 for right neck exploration and excision of periparotid neck mass which appeared to be a lipoma.  Per cardiothoracic surgery the patient may have had a subclinical hypopharyngeal perforation that may have sealed but caused inflammation into the deep cervical space and superior mediastinitis.  Infectious disease was consulted for antibiotic recommendations, on admission she was initiated on broad-spectrum IV antibiotics which was continued throughout her hospital stay.  At discharge she will transition to Augmentin.  She will follow-up with cardiothoracic surgery in a week.  Radiology is recommending short-term CT follow-up in 3 to 4 weeks for additional follow-up of inflammation.  The patient does not have a PCP but she was provided with a list of providers who are currently accepting new patients and she was instructed to make a new patient appointment as soon as possible.  This was discussed with her on the day of discharge.    Day of Discharge     Subjective:  Resting in bed, NASEEM drain has been removed and she feels well.  She is looking forward to going  home    Physical Exam:  Temp:  [97.4 °F (36.3 °C)-98.8 °F (37.1 °C)] 97.4 °F (36.3 °C)  Heart Rate:  [62-91] 91  Resp:  [16-18] 17  BP: ()/(68-69) 104/68  Body mass index is 22.88 kg/m².  Physical Exam  Constitutional:       General: She is not in acute distress.     Appearance: Normal appearance. She is not ill-appearing.   Neck:      Comments: NASEEM drain removed  Cardiovascular:      Rate and Rhythm: Normal rate and regular rhythm.   Pulmonary:      Effort: Pulmonary effort is normal. No respiratory distress.      Breath sounds: Normal breath sounds. No wheezing.   Abdominal:      General: Abdomen is flat. Bowel sounds are normal. There is no distension.      Palpations: Abdomen is soft.   Musculoskeletal:         General: No swelling or tenderness.      Right lower leg: No edema.      Left lower leg: No edema.   Skin:     General: Skin is warm and dry.   Neurological:      General: No focal deficit present.      Mental Status: She is alert and oriented to person, place, and time. Mental status is at baseline.         Consultants     Consult Orders (all) (From admission, onward)     Start     Ordered    04/12/23 0904  Inpatient Infectious Diseases Consult  Once        Specialty:  Infectious Diseases  Provider:  Suresh Saldivar MD    04/12/23 0904    04/11/23 1532  Inpatient Thoracic Surgery Consult  STAT        Specialty:  Thoracic Surgery  Provider:  Hemanth La MD    04/11/23 1531    04/11/23 1532  [facility] (on-call MD unless specified) Details  Once,   Status:  Canceled        Specialty:  Hospitalist  Provider:  Denise Borja MD    04/11/23 1531              Procedures     Imaging Results (All)     Procedure Component Value Units Date/Time    XR Chest 1 View [[id number]] Collected: 04/13/23 0747     Updated: 04/13/23 0752    Narrative:      XR CHEST 1 VW-  04/13/2023     HISTORY: Postop lung surgery.     Heart size is within normal limits. Lungs are relatively well expanded  and appear clear.  No significant pneumothorax is seen. Bony structures  appear unremarkable.       Impression:      1. No acute process.     This report was finalized on 4/13/2023 7:48 AM by Dr. Reed Wharton M.D.       CT Soft Tissue Neck With Contrast [[id number]] Collected: 04/11/23 1855     Updated: 04/12/23 0928    Addenda:        ADDENDUM #2: The possibility of an underlying lesion such as a  malignancy at this site cannot be entirely occluded, and I would  strongly recommend follow-up CT imaging of the neck until complete  resolution of these areas of abnormality. A reasonable interval for the  first follow-up exam would be approximately 3-4 weeks. These findings  and recommendations were directly discussed the hospitalist caring for  this patient, Dr. Xochilt Livingston MD, on 04/12/2023 at approximately  9:00 AM.     This report was finalized on 4/12/2023 9:25 AM by Dr. Alonso Morris M.D.     Signed: 04/12/23 0925 by Alonso Morris MD            ADDENDUM: Another differential diagnostic possibility for the infected  appearing collection is an infected paratracheal air cyst. This  additional differential diagnostic consideration was discussed with Dr. La at approximately 8:00 PM.     This report was finalized on 4/11/2023 9:12 PM by Dr. Alonso Morris M.D.     Signed: 04/11/23 2112 by Alonso Morris MD    Narrative:      CT SCAN OF THE SOFT TISSUES OF THE NECK WITH CONTRAST     CLINICAL HISTORY: Chest pain for a couple of days.     TECHNIQUE: CT scan of the soft tissues of the neck was obtained with 3  mm axial images following the administration of IV contrast. Sagittal  and coronal reconstructed images were obtained.     FINDINGS:     There is a complex collection seen along the right aspect of the  proximal cervical esophagus that contains both air as well as fluid and  soft tissue enhancing foci. This collection extends into the visualized  superior mediastinum where there is inflammation of the fat. The  collection  measures up to approximately 3.1 x 2.5 cm in greatest axial  dimensions. The findings are highly suspicious for an inflammatory  collection secondary to a contained esophageal perforation. This  inflammatory change extends to the level of the innominate artery and  circumscribes the proximal aspect of the right common carotid artery.     The visualized contents of the cranial vault are within normal limits.  The orbits are unremarkable. The parotid glands, submandibular glands,  and sublingual glands are within normal limits. The oral cavity and  tongue are within normal limits. The epiglottis, aryepiglottic folds,  and true vocal cords are unremarkable in appearance. The subglottic  airway as well as proximal trachea are deviated slightly anteriorly into  the left. The visualized lung apices are clear.     Extensive changes of inflammatory paranasal sinus disease are seen with  complete opacification of the visualized frontal sinus as well as  ethmoid air cells. There is subtotal opacification of the maxillary  sinuses with mucosal thickening as well as air-fluid levels. Air-fluid  levels are additionally seen within the sphenoid sinus.       Impression:         There is a complex collection seen along the right aspect of the  posterior cervical esophagus that contains air, fluid as well as  enhancing soft tissue foci. This collection extends into the level of  the visualized superior mediastinum where there is inflammation of the  fat. Again, the collection measures up to 3.1 x 2.5 cm in greatest axial  dimensions and extends to the innominate artery and circumscribes the  proximal aspect of the right common carotid artery. The findings are  highly suspicious for an inflammatory collection secondary to a  contained esophageal perforation.     Extensive changes of inflammatory paranasal sinus disease are  incidentally noted as discussed in detail above.     These findings were discussed with Dr. Hemanth aL on  04/11/2023 at  approximately 7:10 PM.     Radiation dose reduction techniques were utilized, including automated  exposure control and exposure modulation based on body size.     This report was finalized on 4/11/2023 7:28 PM by Dr. Alonso Morris M.D.       XR Chest 1 View [[id number]] Collected: 04/12/23 0727     Updated: 04/12/23 0733    Narrative:      PORTABLE CHEST X-RAY     HISTORY: Postop neck exploration and mass excision yesterday.     Portable chest x-ray is provided. Correlation: Chest x-ray and CT scans  04/11/2023.     FINDINGS: There is a soft tissue drain in the upper right  mediastinum/neck base. The cardiomediastinal silhouette is normal. The  lungs are clear. The costophrenic sulci are dry and the bones appear  normal. There is no pneumothorax.       Impression:      Negative.     This report was finalized on 4/12/2023 7:30 AM by Dr. Chin Baron M.D.       XR Chest 1 View [[id number]] Collected: 04/11/23 2336     Updated: 04/11/23 2341    Narrative:      STAT PORTABLE RADIOGRAPHIC VIEW THE CHEST     CLINICAL HISTORY: Postop lung surgery.     FINDINGS:     The lungs remain clear of acute infiltrates. The cardiac mediastinal  silhouette is remarkable for an tracheal deviation to the left as seen  on the preoperative examination secondary to a fluid collection noted on  the preoperative CT study. The osseous structures are unremarkable.     This report was finalized on 4/11/2023 11:38 PM by Dr. Alonso Morris M.D.       CT Angiogram Chest Pulmonary Embolism [[id number]] Collected: 04/11/23 1536     Updated: 04/11/23 2025    Addenda:        ADDENDUM: Given the normal appearance of the esophagram, an alternative  diagnosis in this patient includes an infected paratracheal air cyst and  that should also be considered in the differential diagnosis.     This report was finalized on 4/11/2023 8:22 PM by Dr. Hammad Castillo M.D.     Signed: 04/11/23 2022 by Hammad Castillo MD    Narrative:      CT  ANGIOGRAM CHEST WITH IV CONTRAST     HISTORY: 47-year-old female with chest pain and cough. Pain is localized  to the right upper chest and right scapular region.     TECHNIQUE: CT angiogram chest includes axial imaging from the thoracic  inlet to the upper abdomen with IV contrast and data reconstructed in  coronal and sagittal planes and 3-dimensional volume rendering was  performed.     FINDINGS: Beginning posterior to the lower pole of the right thyroid  gland and adjacent to the right aspect of the esophagus and posterior to  the right aspect of the trachea there is abnormal air that measures  approximately 2.1 x 1.9 cm. This is centered at the level of the  thoracic inlet and there is surrounding inflammation with soft tissue  thickening and stranding within the fat. The trachea is displaced  anteriorly and to the left and the lower pole of the thyroid gland is  displaced anteriorly. There is stranding/inflammation within the  posterior mediastinal fat that also extends caudally posterior to the  trachea and into the subcarinal region. There appears to be esophageal  wall thickening.     There is no intrapulmonary arterial filling defect to diagnose a  pulmonary embolus. Lungs appear clear and there is no infiltrate or  pleural effusion.       Impression:      1. Abnormal gas collection to the right of the esophagus at the level of  the thoracic inlet with surrounding soft tissue inflammation that  extends into the posterior mediastinum and adjacent to the esophagus.  There is also soft tissue wall thickening. Findings are suspicious for  esophageal perforation with abscess and mediastinal inflammation.  2. No evidence for pulmonary thromboembolic disease.     Findings discussed with Dr. Dickerson in the emergency department  04/11/2023 at 3:30 PM.      Radiation dose reduction techniques were utilized, including automated  exposure control and exposure modulation based on body size.     This report was  finalized on 4/11/2023 7:35 PM by Dr. Hammad Castillo M.D.       FL Esophagram Complete Single Contrast [[id number]] Collected: 04/11/23 1832     Updated: 04/11/23 1938    Narrative:      SINGLE CONTRAST GASTROGRAFIN SWALLOWING EXAM     HISTORY: 37-year-old female with chest pain and abnormal paraesophageal  gas at the level of the thoracic inlet on CT.     TECHNIQUE AND FINDINGS: Initially, the patient swallowed Gastrografin in  an upright position in various projections. Exam demonstrates the right  paraesophageal fluid collection adjacent to the esophagus at the level  of the thoracic inlet. There is no extension of contrast into this  collection. The esophagus exhibits normal configuration and there is no  delay in passage of contrast or esophageal mucosal abnormality or  mechanical obstruction. Patient was placed in an VELAZCO and drank more  water-soluble contrast material and there is no evidence for  extravasation of contrast from the esophagus.       Impression:      There is no evidence for esophageal leak at this time. No  water-soluble contrast material was demonstrated extending into the  abnormal right paraesophageal air collection at the level of the  thoracic inlet.     FLUOROSCOPY TIME: 2 minutes 52 seconds, 98 images.     This report was finalized on 4/11/2023 7:34 PM by Dr. Hammad Castillo M.D.       XR Chest 2 View [[id number]] Collected: 04/11/23 1306     Updated: 04/11/23 1309    Narrative:      XR CHEST 2 VW-     HISTORY: Female who is 37 years-old,  chest pain     TECHNIQUE: Frontal and lateral views of the chest     COMPARISON: None available     FINDINGS: Heart, mediastinum and pulmonary vasculature are unremarkable.  No focal pulmonary consolidation, pleural effusion, or pneumothorax. No  acute osseous process.       Impression:      No evidence for acute pulmonary process. Follow-up as  clinical indications persist.     This report was finalized on 4/11/2023 1:06 PM by Dr. Marshal BENITEZ  CORAL Vazquez             Pertinent Labs     Results from last 7 days   Lab Units 04/13/23  0530 04/12/23  0535 04/11/23  1145   WBC 10*3/mm3 12.70* 14.93* 13.39*   HEMOGLOBIN g/dL 11.5* 12.6 15.2   PLATELETS 10*3/mm3 324 348 466*     Results from last 7 days   Lab Units 04/13/23  0530 04/11/23  1145   SODIUM mmol/L 140 140   POTASSIUM mmol/L 3.6 4.1   CHLORIDE mmol/L 108* 101   CO2 mmol/L 26.2 27.1   BUN mg/dL 11 9   CREATININE mg/dL 0.64 0.71   GLUCOSE mg/dL 91 101*   Estimated Creatinine Clearance: 136.2 mL/min (by C-G formula based on SCr of 0.64 mg/dL).  Results from last 7 days   Lab Units 04/11/23  1145   ALBUMIN g/dL 4.7   BILIRUBIN mg/dL 0.5   ALK PHOS U/L 75   AST (SGOT) U/L 12   ALT (SGPT) U/L 13     Results from last 7 days   Lab Units 04/13/23  0530 04/11/23  1145   CALCIUM mg/dL 8.5* 10.0   ALBUMIN g/dL  --  4.7       Results from last 7 days   Lab Units 04/11/23  1322 04/11/23  1145   HSTROP T ng/L <6 <6           Invalid input(s): LDLCALC  Results from last 7 days   Lab Units 04/11/23  1551 04/11/23  1548   BLOODCX  No growth at 2 days No growth at 2 days       Test Results Pending at Discharge     Pending Labs     Order Current Status    Blood Culture - Blood, Arm, Left Preliminary result    Blood Culture - Blood, Arm, Right Preliminary result          Discharge Details        Discharge Medications      New Medications      Instructions Start Date   Acetaminophen Extra Strength 500 MG tablet  Generic drug: acetaminophen   1,000 mg, Oral, Every 8 Hours PRN      amoxicillin-clavulanate 875-125 MG per tablet  Commonly known as: AUGMENTIN   Take 1 tablet by mouth Every 12 (Twelve) Hours for 10 doses.      phenol 1.4 % liquid liquid  Commonly known as: CHLORASEPTIC   1 spray, Mouth/Throat, Every 2 Hours PRN         Continue These Medications      Instructions Start Date   albuterol sulfate  (90 Base) MCG/ACT inhaler  Commonly known as: PROVENTIL HFA;VENTOLIN HFA;PROAIR HFA   2 puffs, Inhalation,  Every 4 Hours PRN      albuterol (2.5 MG/3ML) 0.083% nebulizer solution  Commonly known as: PROVENTIL   2.5 mg, Nebulization, Every 4 Hours PRN      Fluticasone-Salmeterol 250-50 MCG/ACT DISKUS  Commonly known as: ADVAIR/WIXELA   Inhalation, 2 Times Daily - RT             Allergies   Allergen Reactions   • Vancomycin Rash         Discharge Disposition:  Home or Self Care    Discharge Diet:  No active diet order      Discharge Activity:   Activity Instructions     Other Activity Instructions      Activity Instructions: per surgery          CODE STATUS:    Code Status and Medical Interventions:   Ordered at: 04/11/23 2341     Code Status (Patient has no pulse and is not breathing):    CPR (Attempt to Resuscitate)     Medical Interventions (Patient has pulse or is breathing):    Full Support     Release to patient:    Routine Release       Future Appointments   Date Time Provider Department Center   5/2/2023  1:30 PM [facility] CT 1  [facility] CT E [facility]   5/18/2023  9:15 AM Hemanth La MD [facility]     Additional Instructions for the Follow-ups that You Need to Schedule     Discharge Follow-up with PCP   As directed       Currently Documented PCP:    Provider, No Known    PCP Phone Number:    [phone]     Follow Up Details: call number provided to arrange a post-hospital follow up visit/establish care with a new provider         Discharge Follow-up with Specified Provider: Dr. La in 1 week; 1 Week   As directed      To: Dr. La in 1 week    Follow Up: 1 Week         CT soft tissue neck w contrast   Apr 27, 2023      Pregnant?: No    Release to patient: Routine Release            Follow-up Information     Provider, No Known .    Why: call number provided to arrange a post-hospital follow up visit/establish care with a new provider  Contact information:  [facility] [address]  [phone]             Hemanth La MD. Schedule an appointment as soon as possible for a visit in 1 week(s).     Specialty: Thoracic Surgery  Contact information:  [facility] Stein  [address] [id number] [phone]             Provider, No Known .    Contact information:  [facility] [id number] [phone]                         Additional Instructions for the Follow-ups that You Need to Schedule     Discharge Follow-up with PCP   As directed       Currently Documented PCP:    Provider, No Known    PCP Phone Number:    [phone]     Follow Up Details: call number provided to arrange a post-hospital follow up visit/establish care with a new provider         Discharge Follow-up with Specified Provider: Dr. La in 1 week; 1 Week   As directed      To: Dr. La in 1 week    Follow Up: 1 Week         CT soft tissue neck w contrast   Apr 27, 2023      Pregnant?: No    Release to patient: Routine Release           Time Spent on Discharge:  I spent greater than 30 minutes on this discharge activity which included: face-to-face encounter with the patient, reviewing the data in the system, coordination of the care with the nursing staff as well as consultants, documentation, and entering orders.       Xochilt Livingston MD  [facility]  04/13/23  18:29 EDT                Electronically signed by Xochilt Livingston MD at 04/13/23 3999

## 2023-05-01 ENCOUNTER — OFFICE VISIT (OUTPATIENT)
Dept: SURGERY | Facility: CLINIC | Age: 38
End: 2023-05-01
Payer: COMMERCIAL

## 2023-05-01 VITALS
BODY MASS INDEX: 22.22 KG/M2 | HEIGHT: 69 IN | RESPIRATION RATE: 13 BRPM | SYSTOLIC BLOOD PRESSURE: 110 MMHG | OXYGEN SATURATION: 100 % | HEART RATE: 74 BPM | DIASTOLIC BLOOD PRESSURE: 68 MMHG | WEIGHT: 150 LBS | TEMPERATURE: 97.6 F

## 2023-05-01 DIAGNOSIS — J98.51 MEDIASTINITIS: Primary | ICD-10-CM

## 2023-05-02 ENCOUNTER — TELEPHONE (OUTPATIENT)
Dept: SURGERY | Facility: CLINIC | Age: 38
End: 2023-05-02
Payer: COMMERCIAL

## 2023-05-02 NOTE — TELEPHONE ENCOUNTER
Called patient regarding scheduling her scans. No answer left message to call back. [facility] does not accept her insurance the only  that does accept it  is  [facility]. Patient will need to be scheduled there for all scans until  insurance changes or [facility] starts accepting her current one.

## 2023-05-05 ENCOUNTER — HOSPITAL ENCOUNTER (OUTPATIENT)
Dept: CT IMAGING | Facility: HOSPITAL | Age: 38
Discharge: HOME | End: 2023-05-05
Payer: COMMERCIAL

## 2023-05-05 DIAGNOSIS — R93.89: ICD-10-CM

## 2023-05-05 DIAGNOSIS — J98.51 MEDIASTINITIS: ICD-10-CM

## 2023-05-05 DIAGNOSIS — R22.1 MASS OF RIGHT SIDE OF NECK: ICD-10-CM

## 2023-05-05 PROCEDURE — 25510000001 IOPAMIDOL PER 1 ML: Performed by: NURSE PRACTITIONER

## 2023-05-05 PROCEDURE — 70491 CT SOFT TISSUE NECK W/DYE: CPT

## 2023-05-05 PROCEDURE — 71260 CT THORAX DX C+: CPT

## 2023-05-05 RX ADMIN — IOPAMIDOL 100 ML: 755 INJECTION, SOLUTION INTRAVENOUS at 14:06

## 2023-05-11 ENCOUNTER — OFFICE VISIT (OUTPATIENT)
Dept: SURGERY | Facility: CLINIC | Age: 38
End: 2023-05-11
Payer: COMMERCIAL

## 2023-05-11 VITALS
BODY MASS INDEX: 22.22 KG/M2 | WEIGHT: 150 LBS | HEART RATE: 74 BPM | RESPIRATION RATE: 10 BRPM | HEIGHT: 69 IN | OXYGEN SATURATION: 97 % | SYSTOLIC BLOOD PRESSURE: 96 MMHG | DIASTOLIC BLOOD PRESSURE: 60 MMHG

## 2023-05-11 DIAGNOSIS — J32.9 SINUSITIS, UNSPECIFIED CHRONICITY, UNSPECIFIED LOCATION: Primary | ICD-10-CM

## 2023-05-11 NOTE — PROGRESS NOTES
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    Subjective   HISTORY OF PRESENTING ILLNESS:   Wilda Mayo is a 37 y.o. female who does not have significant medical problems.  She was in her usual state of health until 4/10/2023 when she started having right chest pain radiating to her neck and scapular region. There was no precipitating events.  The pain progressively worsen and prompted her to come to the ER on 4/11/2023.  The pain was exacerbated with coughing and her anterior neck was tender to touch.  She denied fever, chills, rigors.  No prior report of similar pain.  She denied history of neck or chest surgery.  No prior instrumentation or endoscopic intervention.  She denied unintentional weight loss or diagnosis of cancer.  She denied smoking, vaping, drug abuse or alcohol intake in excess.    She works for [employer].  She moved to [place] in 2022 from [place].  She is originally from [place] and  her  2 years ago who is from [place].  They have 1 year kid who is healthy.  She is otherwise very active and independent in her activities of daily living. Since 2020, she was getting evaluation for asthma.  She is currently on fluticasone and albuterol inhaler.  She denied reflux symptoms.     In the ER, she was found to have mild leukocytosis and CT of the chest was obtained to rule out pulmonary embolism.  There was no evidence of pulmonary embolism but she was found to have abnormal gas collection to the right of the esophagus at the level of the thoracic inlet with surrounding soft tissue inflammation which extend into the posterior mediastinum adjacent to the esophagus.  This was concerning for esophageal perforation.    Esophagram did not show any evidence of esophageal perforation.  CT scan of the neck again demonstrated complex collection with air  next to the posterior esophagus towards the right side.    Though she did not have evidence of esophageal perforation on esophagram.  I discussed her case with the radiologist. The CT scan findings were concerning for abscess in the deep cervical space.  I discussed the findings with the patient and her  and recommended pan endoscopy and neck exploration which she underwent on 4/11/2023.    Intraoperatively:   1.  No evidence of tracheal cyst or injury.  2.  No evidence of oropharyngeal abnormality, esophageal cyst, diverticulum or mucosal abnormality.  3.  Edematous tissue planes suggestive of deep space inflammation.  4.  No evidence of purulent collection.  5.  3 x 2 cm mass lying anteriomedial to the carotid artery at the level of the neck base.  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  6.    She was extubated, had good cough and voice.           She was discharged home after her white count normalized on antibiotics for a week.  The pathology result from the neck mass revealed thymic tissue. She was advised to follow-up with us with CT scan of the neck.  She called our office due to pain around her neck and she was concerned about something wrong.  She requested an appointment. She complained of mild pain around the upper half of the incision.  There were no signs and symptoms concerning for local or systemic infection.  I recommended following up with CT scan of the neck and chest which was done on 5/5/2023.  Scan reported resolution of previously noted inflammation in the neck and supra mediastinum.  There was 1.8 x 1.5 cm gas collection along the right posteriolateral aspect of the trachea suggestive of tracheal diverticulum.    She came to clinic to discuss the CT scan findings.  She still had mild pain in her neck. She denied fever or chest pain. She reported dyspnea with any physical activity for many years. She does not regularly use her inhaler. She denies a sore throat, but  "reported positional neck pain. She inquired about an opacity seen in her sinus on CT scan. She is experiencing sinus symptoms as well as headaches.    Past Medical History:   Diagnosis Date   • Asthma        Past Surgical History:   Procedure Laterality Date   • ESOPHAGUS SURGERY N/A 04/11/2023    Procedure: EXPLORATION OF NECK, EXCISION OF NECK MASS, EGD, AND FLEXIBLE BRONCHOSCOPY;  Surgeon: Hemanth La MD;  Location: [facility] OR;  Service: Thoracic;  Laterality: N/A;       Family History   Problem Relation Age of Onset   • Cancer Mother    • Cancer Paternal Aunt        Social History     Socioeconomic History   • Marital status:    Tobacco Use   • Smoking status: Never     Passive exposure: Never   • Smokeless tobacco: Never   Vaping Use   • Vaping Use: Never used   Substance and Sexual Activity   • Alcohol use: Never   • Drug use: Never   • Sexual activity: Yes     Partners: Male     Birth control/protection: None         Current Outpatient Medications:   •  albuterol sulfate  (90 Base) MCG/ACT inhaler, Inhale 2 puffs Every 4 (Four) Hours As Needed for Wheezing., Disp: , Rfl:   •  Fluticasone-Salmeterol (ADVAIR/WIXELA) 250-50 MCG/ACT DISKUS, Inhale 2 (Two) Times a Day., Disp: , Rfl:   •  albuterol (PROVENTIL) (2.5 MG/3ML) 0.083% nebulizer solution, Take 2.5 mg by nebulization Every 4 (Four) Hours As Needed for Wheezing or Shortness of Air., Disp: 90 mL, Rfl: 0  •  phenol (CHLORASEPTIC) 1.4 % liquid liquid, Apply 1 spray to the mouth or throat Every 2 (Two) Hours As Needed (sore throat)., Disp: , Rfl:      Allergies   Allergen Reactions   • Vancomycin Rash             Objective    OBJECTIVE:     VITAL SIGNS:  BP 96/60   Pulse 74   Resp 10   Ht 175.3 cm (69\")   Wt 68 kg (150 lb)   SpO2 97%   BMI 22.15 kg/m²     PHYSICAL EXAM:  Constitutional:       Appearance: Normal appearance.   HENT:      Head: Normocephalic.      Right Ear: External ear normal.      Left Ear: External ear normal.      " Nose: Nose normal.      Mouth/Throat: No obvious deformity     Neck incision clean, dry, intact.  No swelling.  Mild tenderness at the right submandibular region.  Eyes:      Conjunctiva/sclera: Conjunctivae normal.   Cardiovascular:      Rate and Rhythm: Normal rate.      Pulses: Normal pulses.   Pulmonary:      Effort: Pulmonary effort is normal.  Abdominal:      Palpations: Abdomen is soft.   Musculoskeletal:         General: Normal range of motion.      Cervical back: Normal range of motion.   Skin:     General: Skin is warm.   Neurological:      General: No focal deficit present.      Mental Status: He is alert and oriented to person, place, and time.     LAB RESULTS:  I have reviewed all the available laboratory results in the chart.    RESULTS REVIEW:  I have reviewed the patient's all relevant laboratory and imaging findings.         ASSESSMENT & PLAN:  Wilda Mayo is a 37 y.o. female with significant medical conditions as mentioned above presented to my clinic.    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      She had an unusual presentation of sudden neck pain associated with leukocytosis, neck tenderness and swelling.  The imaging findings were concerning for cervical deep space abscess and mediastinitis likely due to hollow viscus perforation.  I took her to the operating room for panendoscopy.  I did not encounter any mucosal abnormality, evidence of pharyngeal or esophageal perforation.  I did not see any esophageal or tracheal diverticulum.  Due to concern for neck abscess, I performed exploration.  To my surprise, there was no purulent fluid collection.  She had edematous plane which was suggestive of inflammation.  She was kept on antibiotics postoperatively and her leukocytosis improved.  We postulated that she had likely contained perforation from unclear hollow viscus that led to cervical  inflammation and mediastinitis.     She has recovered well from the procedure.  She has some incisional pain but there is no concerning signs or symptoms. We reviewed the results of the patient's chest CT scan, EGD, and flexible bronchoscopy. Her chest was clear and neck inflammation has resolved.  The CT scan reported gas collection along the right trachea without any significant inflammation.  I doubt she has a tracheal diverticulum.     I will provide a work note for lifting restriction of no more than 20 pounds for 2 weeks. I will refer the patient to ENT for sinus related symptoms and CT scan findings of sinusitis. She will have a repeat CT scan in 6 months for surveillance.    I discussed the patients findings and my recommendations with the patient. The patient was given adequate time to ask questions and all questions were answered to patient satisfaction.    Hemanth La MD  Thoracic Surgeon  [facility] and [place]        Dictated utilizing Dragon dictation    I spent 45 minutes caring for Wilda on this date of service. This time includes time spent by me in the following activities:preparing for the visit, reviewing tests, obtaining and/or reviewing a separately obtained history, performing a medically appropriate examination and/or evaluation , counseling and educating the patient/family/caregiver, ordering medications, tests, or procedures, referring and communicating with other health care professionals , documenting information in the medical record, independently interpreting results and communicating that information with the patient/family/caregiver, and care coordination and more than half the time was spent in direct face to face evaluation and decision making.     Transcribed from ambient dictation for Hemanth La MD by Audrey Madrid, Quality .    Patient verbalized consent to the visit recording.  I have personally performed the services described in  this document as transcribed by the above individual, and it is both accurate and complete. Hemanth La MD  5/11/2023

## 2023-05-12 DIAGNOSIS — J98.51 MEDIASTINITIS: Primary | ICD-10-CM

## 2023-05-12 NOTE — PROGRESS NOTES
THORACIC SURGERY CLINIC FOLLOW  UP NOTE    REASON FOR VISIT: Postop visit after neck exploration    Subjective   HISTORY OF PRESENTING ILLNESS:   Wilda Mayo is a 37 y.o. female who does not have significant medical problems.  She was in her usual state of health until 4/10/2023 when she started having right chest pain radiating to her neck and scapular region. There was no precipitating events.  The pain progressively worsen and prompted her to come to the ER on 4/11/2023.  The pain was exacerbated with coughing and her anterior neck was tender to touch.  She denied fever, chills, rigors.  No prior report of similar pain.  She denied history of neck or chest surgery.  No prior instrumentation or endoscopic intervention.  She denied unintentional weight loss or diagnosis of cancer.  She denied smoking, vaping, drug abuse or alcohol intake in excess.    She works for [employer].  She moved to [place] in 2022 from [place].  She is originally from [place] and  her  2 years ago who is from [place].  They have 1 year kid who is healthy.  She is otherwise very active and independent in her activities of daily living. Since 2020, she was getting evaluation for asthma.  She is currently on fluticasone and albuterol inhaler.  She denied reflux symptoms.     In the ER, she was found to have mild leukocytosis and CT of the chest was obtained to rule out pulmonary embolism.  There was no evidence of pulmonary embolism but she was found to have abnormal gas collection to the right of the esophagus at the level of the thoracic inlet with surrounding soft tissue inflammation which extend into the posterior mediastinum adjacent to the esophagus.  This was concerning for esophageal perforation.    Esophagram did not show any evidence of esophageal perforation.  CT scan of the neck again demonstrated complex collection with air next to the posterior esophagus towards the right side.    Though she did not have evidence of  esophageal perforation on esophagram.  I discussed her case with the radiologist. The CT scan findings were concerning for abscess in the deep cervical space.  I discussed the findings with the patient and her  and recommended pan endoscopy and neck exploration which she underwent on 4/11/2023.    Intraoperatively:   1.  No evidence of tracheal cyst or injury.  2.  No evidence of oropharyngeal abnormality, esophageal cyst, diverticulum or mucosal abnormality.  3.  Edematous tissue planes suggestive of deep space inflammation.  4.  No evidence of purulent collection.  5.  3 x 2 cm mass lying anteriomedial to the carotid artery at the level of the neck base.  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  6.    She was extubated, had good cough and voice.           She was discharged home after her white count normalized on antibiotics for a week.  The pathology result from the neck mass revealed thymic tissue. She was advised to follow-up with us with CT scan of the neck.  She called our office due to pain around her neck and she was concerned about something wrong.  She requested an appointment.    She complained of mild pain around the upper half of the incision.  She denied fever, chills, rigors, difficulty swallowing, shortness of breath, chest pain, drainage from the incision.    Past Medical History:   Diagnosis Date   • Asthma        Past Surgical History:   Procedure Laterality Date   • ESOPHAGUS SURGERY N/A 04/11/2023    Procedure: EXPLORATION OF NECK, EXCISION OF NECK MASS, EGD, AND FLEXIBLE BRONCHOSCOPY;  Surgeon: Hemanth La MD;  Location: [facility];  Service: Thoracic;  Laterality: N/A;       Family History   Problem Relation Age of Onset   • Cancer Mother    • Cancer Paternal Aunt        Social History     Socioeconomic History   • Marital status:    Tobacco Use   • Smoking status: Never     Passive exposure: Never   • Smokeless tobacco: Never   Vaping Use   • Vaping Use:  "Never used   Substance and Sexual Activity   • Alcohol use: Never   • Drug use: Never   • Sexual activity: Yes     Partners: Male     Birth control/protection: None         Current Outpatient Medications:   •  albuterol (PROVENTIL) (2.5 MG/3ML) 0.083% nebulizer solution, Take 2.5 mg by nebulization Every 4 (Four) Hours As Needed for Wheezing or Shortness of Air., Disp: 90 mL, Rfl: 0  •  albuterol sulfate  (90 Base) MCG/ACT inhaler, Inhale 2 puffs Every 4 (Four) Hours As Needed for Wheezing., Disp: , Rfl:   •  Fluticasone-Salmeterol (ADVAIR/WIXELA) 250-50 MCG/ACT DISKUS, Inhale 2 (Two) Times a Day., Disp: , Rfl:   •  phenol (CHLORASEPTIC) 1.4 % liquid liquid, Apply 1 spray to the mouth or throat Every 2 (Two) Hours As Needed (sore throat)., Disp: , Rfl:      Allergies   Allergen Reactions   • Vancomycin Rash             Objective    OBJECTIVE:     VITAL SIGNS:  /68 (BP Location: Right arm, Patient Position: Sitting, Cuff Size: Adult)   Pulse 74   Temp 97.6 °F (36.4 °C) (Infrared)   Resp 13   Ht 175.3 cm (69\")   Wt 68 kg (150 lb)   LMP 04/08/2023 (Exact Date)   SpO2 100%   BMI 22.15 kg/m²     PHYSICAL EXAM:  Constitutional:       Appearance: Normal appearance.   HENT:      Head: Normocephalic.      Right Ear: External ear normal.      Left Ear: External ear normal.      Nose: Nose normal.      Mouth/Throat: No obvious deformity     Pharynx: Oropharynx is clear.   Eyes:      Conjunctiva/sclera: Conjunctivae normal.   Cardiovascular:      Rate and Rhythm: Normal rate.      Pulses: Normal pulses.   Pulmonary:      Effort: Pulmonary effort is normal.  Incision clean, dry, intact.  Abdominal:      Palpations: Abdomen is soft.   Musculoskeletal:         General: Normal range of motion.      Cervical back: Normal range of motion.   Skin:     General: Skin is warm.   Neurological:      General: No focal deficit present.      Mental Status: He is alert and oriented to person, place, and time.     LAB " RESULTS:  I have reviewed all the available laboratory results in the chart.    RESULTS REVIEW:  I have reviewed the patient's all relevant laboratory and imaging findings.         ASSESSMENT & PLAN:  Wilda Mayo is a 37 y.o. female with significant medical conditions as mentioned above presented to my clinic.    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    She had an unusual presentation of sudden neck pain associated with leukocytosis, neck tenderness and swelling.  The imaging findings were concerning for cervical deep space abscess and mediastinitis likely due to hollow viscus perforation.  I took her to the operating room for panendoscopy.  I did not encounter any mucosal abnormality, evidence of pharyngeal or esophageal perforation.  I did not see any esophageal or tracheal diverticulum.  Due to concern for neck abscess, I performed exploration.  To my surprise, there was no purulent fluid collection.  She had edematous plane which was suggestive of inflammation.  She was kept on antibiotics postoperatively and her leukocytosis improved.  We postulated that she had likely contained perforation from unclear hollow viscus that led to cervical inflammation and mediastinitis.    She has recovered well from the procedure.  She has some incisional pain but there is no concerning signs or symptoms.  I recommended following up in a week with CT chest and neck.    I discussed the patients findings and my recommendations with the patient. The patient was given adequate time to ask questions and all questions were answered to patient satisfaction. Thank you for this consult and allowing us to participate in the care of your patient.      Hemanth La MD  Thoracic Surgeon  [facility] and [place]        Dictated utilizing Dragon dictation    I spent 40 minutes caring for Wilda on this date of service. This  time includes time spent by me in the following activities:preparing for the visit, reviewing tests, obtaining and/or reviewing a separately obtained history, performing a medically appropriate examination and/or evaluation , counseling and educating the patient/family/caregiver, ordering medications, tests, or procedures, referring and communicating with other health care professionals , documenting information in the medical record, independently interpreting results and communicating that information with the patient/family/caregiver, and care coordination and more than half the time was spent in direct face to face evaluation and decision making.

## 2023-08-24 ENCOUNTER — APPOINTMENT (OUTPATIENT)
Dept: GENERAL RADIOLOGY | Facility: HOSPITAL | Age: 38
DRG: 178 | End: 2023-08-24
Payer: COMMERCIAL

## 2023-08-24 ENCOUNTER — APPOINTMENT (OUTPATIENT)
Dept: CT IMAGING | Facility: HOSPITAL | Age: 38
DRG: 178 | End: 2023-08-24
Payer: COMMERCIAL

## 2023-08-24 ENCOUNTER — HOSPITAL ENCOUNTER (INPATIENT)
Facility: HOSPITAL | Age: 38
LOS: 1 days | Discharge: HOME | DRG: 178 | End: 2023-08-30
Attending: EMERGENCY MEDICINE | Admitting: INTERNAL MEDICINE
Payer: COMMERCIAL

## 2023-08-24 ENCOUNTER — TELEPHONE (OUTPATIENT)
Dept: SURGERY | Facility: CLINIC | Age: 38
End: 2023-08-24

## 2023-08-24 DIAGNOSIS — M54.2 NECK PAIN: ICD-10-CM

## 2023-08-24 DIAGNOSIS — J98.51 MEDIASTINITIS: ICD-10-CM

## 2023-08-24 DIAGNOSIS — R93.89: ICD-10-CM

## 2023-08-24 DIAGNOSIS — J18.9 COMMUNITY ACQUIRED PNEUMONIA, UNSPECIFIED LATERALITY: Primary | ICD-10-CM

## 2023-08-24 LAB
ANION GAP SERPL CALCULATED.3IONS-SCNC: 8.6 MMOL/L (ref 5–15)
BASOPHILS # BLD AUTO: 0.06 10*3/MM3 (ref 0–0.2)
BASOPHILS NFR BLD AUTO: 0.4 % (ref 0–1.5)
BUN SERPL-MCNC: 5 MG/DL (ref 6–20)
BUN/CREAT SERPL: 7.7 (ref 7–25)
CALCIUM SPEC-SCNC: 9.1 MG/DL (ref 8.6–10.5)
CHLORIDE SERPL-SCNC: 105 MMOL/L (ref 98–107)
CO2 SERPL-SCNC: 24.4 MMOL/L (ref 22–29)
CREAT SERPL-MCNC: 0.65 MG/DL (ref 0.57–1)
D-LACTATE SERPL-SCNC: 1 MMOL/L (ref 0.5–2)
DEPRECATED RDW RBC AUTO: 41.9 FL (ref 37–54)
EGFRCR SERPLBLD CKD-EPI 2021: 117.9 ML/MIN/1.73
EOSINOPHIL # BLD AUTO: 0.76 10*3/MM3 (ref 0–0.4)
EOSINOPHIL NFR BLD AUTO: 4.6 % (ref 0.3–6.2)
ERYTHROCYTE [DISTWIDTH] IN BLOOD BY AUTOMATED COUNT: 12.7 % (ref 12.3–15.4)
GEN 5 2HR TROPONIN T REFLEX: <6 NG/L
GLUCOSE SERPL-MCNC: 108 MG/DL (ref 65–99)
HCG SERPL QL: NEGATIVE
HCT VFR BLD AUTO: 39.2 % (ref 34–46.6)
HGB BLD-MCNC: 12.8 G/DL (ref 12–15.9)
IMM GRANULOCYTES # BLD AUTO: 0.07 10*3/MM3 (ref 0–0.05)
IMM GRANULOCYTES NFR BLD AUTO: 0.4 % (ref 0–0.5)
LYMPHOCYTES # BLD AUTO: 2 10*3/MM3 (ref 0.7–3.1)
LYMPHOCYTES NFR BLD AUTO: 12 % (ref 19.6–45.3)
MCH RBC QN AUTO: 29.6 PG (ref 26.6–33)
MCHC RBC AUTO-ENTMCNC: 32.7 G/DL (ref 31.5–35.7)
MCV RBC AUTO: 90.5 FL (ref 79–97)
MONOCYTES # BLD AUTO: 1.08 10*3/MM3 (ref 0.1–0.9)
MONOCYTES NFR BLD AUTO: 6.5 % (ref 5–12)
NEUTROPHILS NFR BLD AUTO: 12.65 10*3/MM3 (ref 1.7–7)
NEUTROPHILS NFR BLD AUTO: 76.1 % (ref 42.7–76)
NRBC BLD AUTO-RTO: 0 /100 WBC (ref 0–0.2)
PLATELET # BLD AUTO: 347 10*3/MM3 (ref 140–450)
PMV BLD AUTO: 9.6 FL (ref 6–12)
POTASSIUM SERPL-SCNC: 3.8 MMOL/L (ref 3.5–5.2)
PROCALCITONIN SERPL-MCNC: 0.08 NG/ML (ref 0–0.25)
QT INTERVAL: 343 MS
RBC # BLD AUTO: 4.33 10*6/MM3 (ref 3.77–5.28)
SARS-COV-2 RNA RESP QL NAA+PROBE: NOT DETECTED
SODIUM SERPL-SCNC: 138 MMOL/L (ref 136–145)
TROPONIN T DELTA: NORMAL
TROPONIN T SERPL HS-MCNC: <6 NG/L
WBC NRBC COR # BLD: 16.62 10*3/MM3 (ref 3.4–10.8)

## 2023-08-24 PROCEDURE — G0378 HOSPITAL OBSERVATION PER HR: HCPCS

## 2023-08-24 PROCEDURE — 80048 BASIC METABOLIC PNL TOTAL CA: CPT | Performed by: EMERGENCY MEDICINE

## 2023-08-24 PROCEDURE — 87635 SARS-COV-2 COVID-19 AMP PRB: CPT | Performed by: EMERGENCY MEDICINE

## 2023-08-24 PROCEDURE — 87040 BLOOD CULTURE FOR BACTERIA: CPT | Performed by: EMERGENCY MEDICINE

## 2023-08-24 PROCEDURE — 71275 CT ANGIOGRAPHY CHEST: CPT

## 2023-08-24 PROCEDURE — 99223 1ST HOSP IP/OBS HIGH 75: CPT | Performed by: NURSE PRACTITIONER

## 2023-08-24 PROCEDURE — 25010000002 CEFTRIAXONE PER 250 MG: Performed by: EMERGENCY MEDICINE

## 2023-08-24 PROCEDURE — 25010000002 MORPHINE PER 10 MG: Performed by: SURGERY

## 2023-08-24 PROCEDURE — 84145 PROCALCITONIN (PCT): CPT | Performed by: EMERGENCY MEDICINE

## 2023-08-24 PROCEDURE — 93005 ELECTROCARDIOGRAM TRACING: CPT | Performed by: EMERGENCY MEDICINE

## 2023-08-24 PROCEDURE — 36415 COLL VENOUS BLD VENIPUNCTURE: CPT

## 2023-08-24 PROCEDURE — 93010 ELECTROCARDIOGRAM REPORT: CPT | Performed by: STUDENT IN AN ORGANIZED HEALTH CARE EDUCATION/TRAINING PROGRAM

## 2023-08-24 PROCEDURE — 83605 ASSAY OF LACTIC ACID: CPT | Performed by: EMERGENCY MEDICINE

## 2023-08-24 PROCEDURE — 25010000002 PIPERACILLIN SOD-TAZOBACTAM PER 1 G: Performed by: INTERNAL MEDICINE

## 2023-08-24 PROCEDURE — 84484 ASSAY OF TROPONIN QUANT: CPT | Performed by: EMERGENCY MEDICINE

## 2023-08-24 PROCEDURE — 74220 X-RAY XM ESOPHAGUS 1CNTRST: CPT

## 2023-08-24 PROCEDURE — 25010000002 ONDANSETRON PER 1 MG: Performed by: INTERNAL MEDICINE

## 2023-08-24 PROCEDURE — 25010000002 AZITHROMYCIN PER 500 MG: Performed by: EMERGENCY MEDICINE

## 2023-08-24 PROCEDURE — 25510000001 IOPAMIDOL PER 1 ML: Performed by: EMERGENCY MEDICINE

## 2023-08-24 PROCEDURE — 0 DIATRIZOATE MEGLUMINE & SODIUM PER 1 ML: Performed by: EMERGENCY MEDICINE

## 2023-08-24 PROCEDURE — 99285 EMERGENCY DEPT VISIT HI MDM: CPT

## 2023-08-24 PROCEDURE — 85025 COMPLETE CBC W/AUTO DIFF WBC: CPT | Performed by: EMERGENCY MEDICINE

## 2023-08-24 PROCEDURE — 84703 CHORIONIC GONADOTROPIN ASSAY: CPT | Performed by: EMERGENCY MEDICINE

## 2023-08-24 RX ORDER — BISACODYL 10 MG
10 SUPPOSITORY, RECTAL RECTAL DAILY PRN
Status: DISCONTINUED | OUTPATIENT
Start: 2023-08-24 | End: 2023-08-24

## 2023-08-24 RX ORDER — ONDANSETRON 2 MG/ML
4 INJECTION INTRAMUSCULAR; INTRAVENOUS EVERY 6 HOURS PRN
Status: DISCONTINUED | OUTPATIENT
Start: 2023-08-24 | End: 2023-08-30 | Stop reason: HOSPADM

## 2023-08-24 RX ORDER — SODIUM CHLORIDE 0.9 % (FLUSH) 0.9 %
10 SYRINGE (ML) INJECTION EVERY 12 HOURS SCHEDULED
Status: DISCONTINUED | OUTPATIENT
Start: 2023-08-24 | End: 2023-08-30 | Stop reason: HOSPADM

## 2023-08-24 RX ORDER — MONTELUKAST SODIUM 10 MG/1
10 TABLET ORAL NIGHTLY
COMMUNITY

## 2023-08-24 RX ORDER — SODIUM CHLORIDE 9 MG/ML
50 INJECTION, SOLUTION INTRAVENOUS CONTINUOUS
Status: DISCONTINUED | OUTPATIENT
Start: 2023-08-24 | End: 2023-08-30 | Stop reason: HOSPADM

## 2023-08-24 RX ORDER — ONDANSETRON 2 MG/ML
4 INJECTION INTRAMUSCULAR; INTRAVENOUS ONCE
Status: COMPLETED | OUTPATIENT
Start: 2023-08-24 | End: 2023-08-24

## 2023-08-24 RX ORDER — POLYETHYLENE GLYCOL 3350 17 G/17G
17 POWDER, FOR SOLUTION ORAL DAILY PRN
Status: DISCONTINUED | OUTPATIENT
Start: 2023-08-24 | End: 2023-08-24

## 2023-08-24 RX ORDER — HYDROCODONE BITARTRATE AND ACETAMINOPHEN 5; 325 MG/1; MG/1
1 TABLET ORAL EVERY 4 HOURS PRN
Status: DISCONTINUED | OUTPATIENT
Start: 2023-08-24 | End: 2023-08-24

## 2023-08-24 RX ORDER — MORPHINE SULFATE 2 MG/ML
2 INJECTION, SOLUTION INTRAMUSCULAR; INTRAVENOUS EVERY 4 HOURS PRN
Status: DISCONTINUED | OUTPATIENT
Start: 2023-08-24 | End: 2023-08-30 | Stop reason: HOSPADM

## 2023-08-24 RX ORDER — DIAZEPAM 5 MG/ML
2.5 INJECTION, SOLUTION INTRAMUSCULAR; INTRAVENOUS EVERY 6 HOURS PRN
Status: DISCONTINUED | OUTPATIENT
Start: 2023-08-24 | End: 2023-08-30 | Stop reason: HOSPADM

## 2023-08-24 RX ORDER — ALBUTEROL SULFATE 2.5 MG/3ML
2.5 SOLUTION RESPIRATORY (INHALATION) EVERY 6 HOURS PRN
Status: DISCONTINUED | OUTPATIENT
Start: 2023-08-24 | End: 2023-08-30 | Stop reason: HOSPADM

## 2023-08-24 RX ORDER — AMOXICILLIN 250 MG
2 CAPSULE ORAL 2 TIMES DAILY
Status: DISCONTINUED | OUTPATIENT
Start: 2023-08-24 | End: 2023-08-24

## 2023-08-24 RX ORDER — SODIUM CHLORIDE 9 MG/ML
40 INJECTION, SOLUTION INTRAVENOUS AS NEEDED
Status: DISCONTINUED | OUTPATIENT
Start: 2023-08-24 | End: 2023-08-30 | Stop reason: HOSPADM

## 2023-08-24 RX ORDER — BISACODYL 5 MG/1
5 TABLET, DELAYED RELEASE ORAL DAILY PRN
Status: DISCONTINUED | OUTPATIENT
Start: 2023-08-24 | End: 2023-08-24

## 2023-08-24 RX ORDER — SODIUM CHLORIDE 0.9 % (FLUSH) 0.9 %
10 SYRINGE (ML) INJECTION AS NEEDED
Status: DISCONTINUED | OUTPATIENT
Start: 2023-08-24 | End: 2023-08-30 | Stop reason: HOSPADM

## 2023-08-24 RX ORDER — ACETAMINOPHEN 650 MG/1
650 SUPPOSITORY RECTAL ONCE
Status: COMPLETED | OUTPATIENT
Start: 2023-08-24 | End: 2023-08-24

## 2023-08-24 RX ADMIN — MORPHINE SULFATE 2 MG: 2 INJECTION, SOLUTION INTRAMUSCULAR; INTRAVENOUS at 22:33

## 2023-08-24 RX ADMIN — ONDANSETRON 4 MG: 2 INJECTION INTRAMUSCULAR; INTRAVENOUS at 16:21

## 2023-08-24 RX ADMIN — SODIUM CHLORIDE 1000 ML: 9 INJECTION, SOLUTION INTRAVENOUS at 10:21

## 2023-08-24 RX ADMIN — ACETAMINOPHEN 650 MG: 650 SUPPOSITORY RECTAL at 14:59

## 2023-08-24 RX ADMIN — AZITHROMYCIN MONOHYDRATE 500 MG: 500 INJECTION, POWDER, LYOPHILIZED, FOR SOLUTION INTRAVENOUS at 15:28

## 2023-08-24 RX ADMIN — DIATRIZOATE MEGLUMINE AND DIATRIZOATE SODIUM 120 ML: 660; 100 LIQUID ORAL; RECTAL at 14:19

## 2023-08-24 RX ADMIN — ONDANSETRON 4 MG: 2 INJECTION INTRAMUSCULAR; INTRAVENOUS at 22:34

## 2023-08-24 RX ADMIN — TAZOBACTAM SODIUM AND PIPERACILLIN SODIUM 3.38 G: 375; 3 INJECTION, SOLUTION INTRAVENOUS at 18:22

## 2023-08-24 RX ADMIN — SODIUM CHLORIDE 100 ML/HR: 9 INJECTION, SOLUTION INTRAVENOUS at 17:34

## 2023-08-24 RX ADMIN — CEFTRIAXONE 2000 MG: 2 INJECTION, POWDER, FOR SOLUTION INTRAMUSCULAR; INTRAVENOUS at 14:57

## 2023-08-24 RX ADMIN — IOPAMIDOL 95 ML: 755 INJECTION, SOLUTION INTRAVENOUS at 11:24

## 2023-08-24 NOTE — ED PROVIDER NOTES
EMERGENCY DEPARTMENT ENCOUNTER    Room Number:  13/13  PCP: Provider, No Known  Historian: Patient,       HPI:  Chief Complaint: Chest pain, neck pain, cough  A complete HPI/ROS/PMH/PSH/SH/FH are unobtainable due to: None    Context: Wilda Mayo is a 37 y.o. female who presents to the ED via private vehicle for evaluation of recent travel to [place] for 5 days, returned on Tuesday.  Had a fever of 104 while there.  Has developed cough and congestion.  Also having some right-sided chest discomfort and mild shortness of breath worse with deep breathing.  However, her symptoms of the chest pain and neck pain are similar to an episode of an infectious/inflammatory process in her chest back in April that required surgery.  They were concerned that maybe this was recurring.  She has not had any residual high fevers.  No recent sick contacts.  Has had nausea but no vomiting.  Has not taken anything for the pain.      MEDICAL RECORD REVIEW    External (non-ED) record review: Op note reviewed from April 11, 2023 with Dr. 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    Asthma 04/12/2023     Resolved Ambulatory Problems     Diagnosis Date Noted    No Resolved Ambulatory Problems     Past Medical History:   Diagnosis Date    Pneumonia 8/24/2023         PAST SURGICAL HISTORY  Past Surgical History:   Procedure Laterality Date    ESOPHAGUS SURGERY N/A 04/11/2023    Procedure: EXPLORATION OF NECK, EXCISION OF NECK MASS, EGD, AND FLEXIBLE BRONCHOSCOPY;  Surgeon: Hemanth La MD;  Location: [facility] OR;  Service: Thoracic;  Laterality:  N/A;         FAMILY HISTORY  Family History   Problem Relation Age of Onset    Cancer Mother     Cancer Paternal Aunt          SOCIAL HISTORY  Social History     Socioeconomic History    Marital status:    Tobacco Use    Smoking status: Never     Passive exposure: Never    Smokeless tobacco: Never   Vaping Use    Vaping Use: Never used   Substance and Sexual Activity    Alcohol use: Never    Drug use: Never    Sexual activity: Yes     Partners: Male     Birth control/protection: None         ALLERGIES  Vancomycin        REVIEW OF SYSTEMS  Review of Systems     All systems reviewed and negative except for those discussed in HPI.       PHYSICAL EXAM    I have reviewed the triage vital signs and nursing notes.    ED Triage Vitals   Temp Heart Rate Resp BP SpO2   08/24/23 0927 08/24/23 0927 08/24/23 0932 08/24/23 0932 08/24/23 0927   99.6 °F (37.6 °C) (!) 128 18 114/74 95 %      Temp src Heart Rate Source Patient Position BP Location FiO2 (%)   08/24/23 0927 08/24/23 0927 -- -- --   Tympanic Monitor          Physical Exam  General: No acute distress, nontoxic  HEENT: Mucous membranes moist, atraumatic, EOMI  Neck: Full ROM, right-sided well-healing surgical scar, trachea midline  Pulm: Symmetric chest rise, nonlabored, lungs CTAB  Cardiovascular: Mild regular rhythm tachycardia, intact distal pulses  GI: Soft, nontender, nondistended, no rebound, no guarding, bowel sounds present  MSK: Full ROM, no deformity  Skin: Warm, dry  Neuro: Awake, alert, oriented x 4, GCS 15, moving all extremities, no focal deficits  Psych: Calm, cooperative        LAB RESULTS  Recent Results (from the past 24 hour(s))   COVID-19, [facility]HOUSE CEPHEID/CHINYERE NP SWAB IN TRANSPORT MEDIA 8-12 HR TAT - Swab, Nasopharynx    Collection Time: 08/24/23 10:05 AM    Specimen: Nasopharynx; Swab   Result Value Ref Range    COVID19 Not Detected Not Detected - Ref. Range   Basic Metabolic Panel    Collection Time: 08/24/23 10:09 AM    Specimen: Blood    Result Value Ref Range    Glucose 108 (H) 65 - 99 mg/dL    BUN 5 (L) 6 - 20 mg/dL    Creatinine 0.65 0.57 - 1.00 mg/dL    Sodium 138 136 - 145 mmol/L    Potassium 3.8 3.5 - 5.2 mmol/L    Chloride 105 98 - 107 mmol/L    CO2 24.4 22.0 - 29.0 mmol/L    Calcium 9.1 8.6 - 10.5 mg/dL    BUN/Creatinine Ratio 7.7 7.0 - 25.0    Anion Gap 8.6 5.0 - 15.0 mmol/L    eGFR 117.9 >60.0 mL/min/1.73   hCG, Serum, Qualitative    Collection Time: 08/24/23 10:09 AM    Specimen: Blood   Result Value Ref Range    HCG Qualitative Negative Negative   High Sensitivity Troponin T    Collection Time: 08/24/23 10:09 AM    Specimen: Blood   Result Value Ref Range    HS Troponin T <6 <10 ng/L   Procalcitonin    Collection Time: 08/24/23 10:09 AM    Specimen: Blood   Result Value Ref Range    Procalcitonin 0.08 0.00 - 0.25 ng/mL   CBC Auto Differential    Collection Time: 08/24/23 10:09 AM    Specimen: Blood   Result Value Ref Range    WBC 16.62 (H) 3.40 - 10.80 10*3/mm3    RBC 4.33 3.77 - 5.28 10*6/mm3    Hemoglobin 12.8 12.0 - 15.9 g/dL    Hematocrit 39.2 34.0 - 46.6 %    MCV 90.5 79.0 - 97.0 fL    MCH 29.6 26.6 - 33.0 pg    MCHC 32.7 31.5 - 35.7 g/dL    RDW 12.7 12.3 - 15.4 %    RDW-SD 41.9 37.0 - 54.0 fl    MPV 9.6 6.0 - 12.0 fL    Platelets 347 140 - 450 10*3/mm3    Neutrophil % 76.1 (H) 42.7 - 76.0 %    Lymphocyte % 12.0 (L) 19.6 - 45.3 %    Monocyte % 6.5 5.0 - 12.0 %    Eosinophil % 4.6 0.3 - 6.2 %    Basophil % 0.4 0.0 - 1.5 %    Immature Grans % 0.4 0.0 - 0.5 %    Neutrophils, Absolute 12.65 (H) 1.70 - 7.00 10*3/mm3    Lymphocytes, Absolute 2.00 0.70 - 3.10 10*3/mm3    Monocytes, Absolute 1.08 (H) 0.10 - 0.90 10*3/mm3    Eosinophils, Absolute 0.76 (H) 0.00 - 0.40 10*3/mm3    Basophils, Absolute 0.06 0.00 - 0.20 10*3/mm3    Immature Grans, Absolute 0.07 (H) 0.00 - 0.05 10*3/mm3    nRBC 0.0 0.0 - 0.2 /100 WBC   ECG 12 Lead Chest Pain    Collection Time: 08/24/23 10:17 AM   Result Value Ref Range    QT Interval 343 ms   High Sensitivity  Troponin T 2Hr    Collection Time: 08/24/23 12:29 PM    Specimen: Blood   Result Value Ref Range    HS Troponin T <6 <10 ng/L    Troponin T Delta         Ordered the above labs and independently interpreted results. My findings will be discussed in the medical decision making section below        RADIOLOGY  FL Esophagram Complete Single Contrast    Result Date: 8/24/2023  ESOPHAGRAM 8/24/2023  HISTORY: Evaluate for esophageal leak.   image of the chest shows nothing acute.  Single contrast esophagram was performed with Gastrografin. No aspiration is seen. Normal swallowing mechanism is demonstrated. No esophageal strictures, reflux, hiatal hernia or evidence of esophagitis is seen. There is no evidence of contrast leak. The upper thoracic esophagus appears deviated minimally to the left and there may be some minimal mass effect on the rightward aspect of the esophagus.      1. No evidence of esophageal perforation.  Fluoroscopy time 56 seconds 66 images 809 dose area product  This report was finalized on 8/24/2023 2:50 PM by Dr. Reed Wharton M.D.      CT Angiogram Chest Pulmonary Embolism    Result Date: 8/24/2023  CT ANGIOGRAM OF THE CHEST. MULTIPLE CORONAL, SAGITTAL, AND 3-D RECONSTRUCTIONS.  HISTORY: 37-year-old female with chest pain and cough.  TECHNIQUE: Radiation dose reduction techniques were utilized, including automated exposure control and exposure modulation based on body size. CT angiogram of the chest was performed following the administration of IV contrast. Multiple coronal, sagittal, and 3-D reconstruction images were obtained. Compared with chest CTA and neck CT 04/11/2023. Also correlated with esophagram 04/11/2023.  FINDINGS: There is no evidence for pulmonary thromboemboli. Again seen is a complex air-containing mass/collection in the superior right paraesophageal region at the thoracic inlet and superior mediastinum. The margins were better seen on the neck CT and appear without  significant change since the chest CTA performed the same day. There is mass effect on the on the trachea and esophagus to the left. The measurements are approximately 3.1 x 2.6 cm which is grossly unchanged. The esophageal wall is indistinct and there is indistinct increased density throughout the mediastinum and sherwin without discrete pathologically enlarged nodes. There are no pleural or pericardial effusions. There is mild asymmetric bronchial thickening at the right lower lobe which was not definitively present previously and there are new ill-defined mixed-density peribronchial airspace opacities in the perihilar region of the left lower lobe and at both lung bases. There is also ill-defined ground-glass opacification at the right lower lobe.      Outside facility neck CT 5/5/2023 has become available for comparison. This collection was predominantly air-filled on the outside facility neck CT. A RECURRENT INFECTED RIGHT PARATRACHEAL CYST IS SUSPECTED TO BE THE ETIOLOGY FOR THESE FINDINGS.   1. Persistent approximately 3.1 x 2.6 cm complex collection/mass containing air in the superior right paraesophageal region. As discussed previously, an infectious or inflammatory etiology is suspected, but the etiology is uncertain and malignancy cannot be entirely excluded. There is mass effect on the trachea and esophagus to the left. No definite esophageal leak was seen on the previous esophagram. Thoracic surgery consultation is recommended. 2. The new bilateral lower lobe airspace opacities as described likely represent bronchiolitis/developing pneumonia and aspiration is a consideration. 3. There is no evidence for pulmonary thromboemboli.         Ordered the above noted radiological studies.  Independently interpreted by me and my independent review of findings can be found in the ED Course.  See dictation for official radiology interpretation.      PROCEDURES    Critical Care  Performed by: Kane Wood,  MD  Authorized by: Kane Wood MD     Critical care provider statement:     Critical care time (minutes):  33    Critical care time was exclusive of:  Separately billable procedures and treating other patients    Critical care was necessary to treat or prevent imminent or life-threatening deterioration of the following conditions:  Sepsis (mediastinal mass with mass effect, sepsis with pneumonia)    Critical care was time spent personally by me on the following activities:  Development of treatment plan with patient or surrogate, discussions with consultants, evaluation of patient's response to treatment, examination of patient, obtaining history from patient or surrogate, ordering and performing treatments and interventions, ordering and review of laboratory studies, ordering and review of radiographic studies, pulse oximetry, re-evaluation of patient's condition and review of old charts    Care discussed with: admitting provider      Care discussed with comment:  Dr. Estrada, admitting; ANNETTE Sellers, Thoracic Surgery    EKG - Per my independent interpretation:    EKG Time: 1017  Rhythm/Rate: Sinus rhythm with rate of 97  Normal axis  Normal intervals  Nonspecific T wave abnormalities, T wave inversion noted in lead III   No STEMI       No emergent changes with slightly increased rate as compared to April 11, 2023      MEDICATIONS GIVEN IN ER    Medications   cefTRIAXone (ROCEPHIN) 2,000 mg in sodium chloride 0.9 % 100 mL IVPB-VTB (2,000 mg Intravenous New Bag 8/24/23 1457)   azithromycin (ZITHROMAX) 500 mg in sodium chloride 0.9 % 250 mL IVPB-VTB (has no administration in time range)   acetaminophen (TYLENOL) suppository 650 mg (has no administration in time range)   sodium chloride 0.9 % bolus 1,000 mL (0 mL Intravenous Stopped 8/24/23 1229)   iopamidol (ISOVUE-370) 76 % injection 100 mL (95 mL Intravenous Given by Other 8/24/23 1124)   diatrizoate meglumine-sodium (GASTROGRAFIN) 66-10 % oral solution  240 mL (120 mL Rectal Given 8/24/23 1419)         PROGRESS, DATA ANALYSIS, CONSULTS, AND MEDICAL DECISION MAKING    Please note that this section constitutes my independent interpretation of clinical data including lab results, radiology, EKG's.  This constitutes my independent professional opinion regarding differential diagnosis and management of this patient.  It may include any factors such as history from outside sources, review of external records, social determinants of health, management of medications, response to those treatments, and discussions with other providers.    Differential Diagnosis and Plan: Initial concern for viral process including possibly COVID, community-acquired pneumonia, PE, pneumothorax, recurrent inflammatory process in the mediastinum, dehydration, renal failure, electrolyte abnormalities, among others.  Plan for labs, CT scan of the chest, viral swab, and reevaluation with results.    Additional sources:  - Discussed/ obtained information from independent historians:       - Chronic or social conditions impacting care:      - Shared decision making:  Patient and  at bedside fully updated on and in agreement with the course and plan moving forward    ED Course as of 08/24/23 1459   Thu Aug 24, 2023   1032 WBC(!): 16.62  12.7 four months ago [DC]   1032 Hemoglobin: 12.8 [DC]   1032 Platelets: 347 [DC]   1040 HCG Qualitative: Negative [DC]   1052 Procalcitonin: 0.08 [DC]   1052 HS Troponin T: <6 [DC]   1052 Glucose(!): 108 [DC]   1052 BUN(!): 5 [DC]   1052 Creatinine: 0.65 [DC]   1052 Sodium: 138 [DC]   1052 Potassium: 3.8 [DC]   1107 COVID19: Not Detected [DC]   1138 CT Angiogram Chest Pulmonary Embolism  Per my independent interpretation of the CT angio of the chest, no evidence of pneumothorax, no overt obvious pneumonia, no overt obvious pulmonary embolism although subtle of PE could be missed, will defer to final radiology report [DC]   1226 CT Angiogram Chest Pulmonary  Embolism  Radiology report reviewed, no evidence of PE.  Complex air-containing mass/collection in the superior right paraesophageal region of the thoracic inlet and superior mediastinum.  There is mass effect on the trachea and esophagus to the left.  Dimensions of 3.1 x 2.6 cm grossly unchanged.  Esophageal wall is indistinct and there is indistinct increased density throughout the mediastinum and sherwin without discrete pathologically enlarged nodes.  Mild asymmetric bronchial thickening at the right lower lobe not definitively present previously and there is a new ill-defined mixed density parabronchial airspace opacity in the perihilar region of the left lower lobe and at both lung bases, also ill-defined groundglass opacification of the right lower lobe. [DC]   1230 Procalcitonin: 0.08 [DC]   1230 HS Troponin T: <6 [DC]   1315 Discussed with Thoracic Surgery APRN, she will discuss with Dr. La [DC]   1322 Esophagram ordered by thoracic surgery, they will evaluate at bedside [DC]   1421 Discussed with Dr. Estrada, A, discussed patient's clinical course and findings today, treatment modalities, thoracic surgery consult and evaluation, and need for hospitalization. [DC]      ED Course User Index  [DC] Kane Wood MD       Hospitalization Considered?: yes    Orders Placed During This Visit:  Orders Placed This Encounter   Procedures    COVID-19,[facility]-HOUSE CEPHEID/CHINYERE NP SWAB IN TRANSPORT MEDIA 8-12 HR TAT - Swab, Nasopharynx    Blood Culture - Blood,    Blood Culture - Blood,    CT Angiogram Chest Pulmonary Embolism    FL Esophagram Complete Single Contrast    Basic Metabolic Panel    hCG, Serum, Qualitative    High Sensitivity Troponin T    Procalcitonin    CBC Auto Differential    High Sensitivity Troponin T 2Hr    Lactic Acid, Plasma    NPO Diet NPO Type: Strict NPO    Inpatient Thoracic Surgery Consult    [facility] (on-call MD unless specified) Details    ECG 12 Lead Chest Pain    Initiate Observation  Status    CBC & Differential       Additional orders considered but not placed:      Independent interpretation of labs, radiology studies, and discussions with consultants: See ED Course        AS OF 14:59 EDT VITALS:    BP - 118/77  HR - 103  TEMP - (!) 101.4 °F (38.6 °C) (Oral)  02 SATS - 100%        DIAGNOSIS  Final diagnoses:   Community acquired pneumonia, unspecified laterality   Mediastinitis         DISPOSITION  HOSPITALIZATION    Discussed treatment plan and reason for hospitalization with pt/family and hospitalizing physician.  Pt/family voiced understanding of the plan for hospitalization for further testing/treatment as needed.                 --    Please note that portions of this were completed with a voice recognition program.       Note Disclaimer: At [facility], we believe that sharing information builds trust and better relationships. You are receiving this note because you are receiving care at [facility] or recently visited. It is possible you will see health information before a provider has talked with you about it. This kind of information can be easy to misunderstand. To help you fully understand what it means for your health, we urge you to discuss this note with your provider.           Kane Wood MD  08/24/23 1500

## 2023-08-24 NOTE — ED NOTES
Nursing report ED to floor  [facility] Farida Mayo  37 y.o.  female    HPI :   Chief Complaint   Patient presents with    Cough    URI       Admitting doctor:   Juan Estrada MD    Admitting diagnosis:   The primary encounter diagnosis was Community acquired pneumonia, unspecified laterality. A diagnosis of Mediastinitis was also pertinent to this visit.    Code status:   Current Code Status       Date Active Code Status Order ID Comments User Context       Prior            Allergies:   Vancomycin    Isolation:   No active isolations    Intake and Output    Intake/Output Summary (Last 24 hours) at 8/24/2023 1450  Last data filed at 8/24/2023 1229  Gross per 24 hour   Intake 1000 ml   Output --   Net 1000 ml       Weight:       08/24/23  0927   Weight: 68.5 kg (151 lb)       Most recent vitals:   Vitals:    08/24/23 1331 08/24/23 1425 08/24/23 1426 08/24/23 1432   BP: 118/77      Pulse: 98 95 103    Resp:    18   Temp:    (!) 101.4 °F (38.6 °C)   TempSrc:    Oral   SpO2: 100% 100% 100%    Weight:       Height:           Active LDAs/IV Access:   Lines, Drains & Airways       Active LDAs       Name Placement date Placement time Site Days    Peripheral IV 08/24/23 1008 Right Antecubital 08/24/23  1008  Antecubital  less than 1                    Labs (abnormal labs have a star):   Labs Reviewed   BASIC METABOLIC PANEL - Abnormal; Notable for the following components:       Result Value    Glucose 108 (*)     BUN 5 (*)     All other components within normal limits    Narrative:     GFR Normal >60  Chronic Kidney Disease <60  Kidney Failure <15     CBC WITH AUTO DIFFERENTIAL - Abnormal; Notable for the following components:    WBC 16.62 (*)     Neutrophil % 76.1 (*)     Lymphocyte % 12.0 (*)     Neutrophils, Absolute 12.65 (*)     Monocytes, Absolute 1.08 (*)     Eosinophils, Absolute 0.76 (*)     Immature Grans, Absolute 0.07 (*)     All other components within normal limits   COVID-19,[facility] IN-HOUSE CEPHEID/CHINYERE, NP  "SWAB IN TRANSPORT MEDIA 8-12 HR TAT - Normal    Narrative:     Fact sheet for providers: https://www.fda.gov/media/460953/download     Fact sheet for patients: https://www.fda.gov/media/984529/download   HCG, SERUM, QUALITATIVE - Normal   TROPONIN - Normal    Narrative:     High Sensitive Troponin T Reference Range:  <10.0 ng/L- Negative Female for AMI  <15.0 ng/L- Negative Male for AMI  >=10 - Abnormal Female indicating possible myocardial injury.  >=15 - Abnormal Male indicating possible myocardial injury.   Clinicians would have to utilize clinical acumen, EKG, Troponin, and serial changes to determine if it is an Acute Myocardial Infarction or myocardial injury due to an underlying chronic condition.        PROCALCITONIN - Normal    Narrative:     As a Marker for Sepsis (Non-Neonates):    1. <0.5 ng/mL represents a low risk of severe sepsis and/or septic shock.  2. >2 ng/mL represents a high risk of severe sepsis and/or septic shock.    As a Marker for Lower Respiratory Tract Infections that require antibiotic therapy:    PCT on Admission    Antibiotic Therapy       6-12 Hrs later    >0.5                Strongly Recommended  >0.25 - <0.5        Recommended   0.1 - 0.25          Discouraged              Remeasure/reassess PCT  <0.1                Strongly Discouraged     Remeasure/reassess PCT    As 28 day mortality risk marker: \"Change in Procalcitonin Result\" (>80% or <=80%) if Day 0 (or Day 1) and Day 4 values are available. Refer to http://www.[employer]-pct-calculator.com    Change in PCT <=80%  A decrease of PCT levels below or equal to 80% defines a positive change in PCT test result representing a higher risk for 28-day all-cause mortality of patients diagnosed with severe sepsis for septic shock.    Change in PCT >80%  A decrease of PCT levels of more than 80% defines a negative change in PCT result representing a lower risk for 28-day all-cause mortality of patients diagnosed with severe sepsis or septic " shock.      BLOOD CULTURE   BLOOD CULTURE   HIGH SENSITIVITIY TROPONIN T 2HR    Narrative:     High Sensitive Troponin T Reference Range:  <10.0 ng/L- Negative Female for AMI  <15.0 ng/L- Negative Male for AMI  >=10 - Abnormal Female indicating possible myocardial injury.  >=15 - Abnormal Male indicating possible myocardial injury.   Clinicians would have to utilize clinical acumen, EKG, Troponin, and serial changes to determine if it is an Acute Myocardial Infarction or myocardial injury due to an underlying chronic condition.        LACTIC ACID, PLASMA   CBC AND DIFFERENTIAL    Narrative:     The following orders were created for panel order CBC & Differential.  Procedure                               Abnormality         Status                     ---------                               -----------         ------                     CBC Auto Differential[[id number]]        Abnormal            Final result                 Please view results for these tests on the individual orders.       EKG:   ECG 12 Lead Chest Pain   Final Result   HEART RATE= 97  bpm   RR Interval= 619  ms   OR Interval= 141  ms   P Horizontal Axis= 14  deg   P Front Axis= 57  deg   QRSD Interval= 82  ms   QT Interval= 343  ms   QRS Axis= 66  deg   T Wave Axis= -16  deg   - BORDERLINE ECG -   Sinus rhythm   Borderline T abnormalities, inferior leads   When compared with ECG of 11-Apr-2023 12:42:17,   No significant change   Electronically Signed By: David Pierre ([facility]) 24-Aug-2023 14:30:12   Date and Time of Study: 2023-08-24 10:17:45          Meds given in ED:   Medications   cefTRIAXone (ROCEPHIN) 2,000 mg in sodium chloride 0.9 % 100 mL IVPB-VTB (has no administration in time range)   azithromycin (ZITHROMAX) 500 mg in sodium chloride 0.9 % 250 mL IVPB-VTB (has no administration in time range)   acetaminophen (TYLENOL) suppository 650 mg (has no administration in time range)   sodium chloride 0.9 % bolus 1,000 mL (0 mL Intravenous Stopped  8/24/23 1229)   iopamidol (ISOVUE-370) 76 % injection 100 mL (95 mL Intravenous Given by Other 8/24/23 1124)   diatrizoate meglumine-sodium (GASTROGRAFIN) 66-10 % oral solution 240 mL (120 mL Rectal Given 8/24/23 1419)       Imaging results:  CT Angiogram Chest Pulmonary Embolism    Result Date: 8/24/2023  Outside facility neck CT 5/5/2023 has become available for comparison. This collection was predominantly air-filled on the outside facility neck CT. A RECURRENT INFECTED RIGHT PARATRACHEAL CYST IS SUSPECTED TO BE THE ETIOLOGY FOR THESE FINDINGS.   1. Persistent approximately 3.1 x 2.6 cm complex collection/mass containing air in the superior right paraesophageal region. As discussed previously, an infectious or inflammatory etiology is suspected, but the etiology is uncertain and malignancy cannot be entirely excluded. There is mass effect on the trachea and esophagus to the left. No definite esophageal leak was seen on the previous esophagram. Thoracic surgery consultation is recommended. 2. The new bilateral lower lobe airspace opacities as described likely represent bronchiolitis/developing pneumonia and aspiration is a consideration. 3. There is no evidence for pulmonary thromboemboli.         Ambulatory status:   ax1    Social issues:   Social History     Socioeconomic History    Marital status:    Tobacco Use    Smoking status: Never     Passive exposure: Never    Smokeless tobacco: Never   Vaping Use    Vaping Use: Never used   Substance and Sexual Activity    Alcohol use: Never    Drug use: Never    Sexual activity: Yes     Partners: Male     Birth control/protection: None       NIH Stroke Scale:       Registered Nurse, RN  08/24/23 14:50 EDT

## 2023-08-24 NOTE — TELEPHONE ENCOUNTER
Provider: MICHAEL  Caller: ROSCOE VERDUZCO  Relationship to Patient: SPOUSE  Phone Number: [phone]  Reason for Call: WANTED DR RODRIGUEZ AWARE PT IS HEADED TO ER DUE TO THE SAME SYMPTOMS WHEN SHE VISITED.

## 2023-08-24 NOTE — CONSULTS
CONSULT NOTE    Patient Identification:  Wilda Mayo  37 y.o.  female  1985  [id number]            Requesting physician: Hospitalist    Reason for Consultation: Pneumonia    CC:     History of Present Illness:  37-year-old female present to the emergency room recent travel to [place] 5 days ago returned with fever of 104 cough and congestion.  Also reports right-sided chest discomfort with shortness of breath.  Apparently she had a similar episode with chest pain and neck pain related to an episode of infectious inflammatory process in April where it was noted that she had paratracheal gas collection with mediastinitis for which she required thoracic surgery OR with right neck exploration excision of parotid neck mass that appeared to be lipoma.  It was felt that patient had subclinical hypopharyngeal perforation that may have seizure that caused inflammation into the deep cervical space and superior mediastinitis.  Patient also required broad-spectrum antibiotics per IV's recommendation.      Review of Systems  As above rest is negative  Past Medical History:  Past Medical History:   Diagnosis Date    Asthma     Pneumonia 8/24/2023       Past Surgical History:  Past Surgical History:   Procedure Laterality Date    ESOPHAGUS SURGERY N/A 04/11/2023    Procedure: EXPLORATION OF NECK, EXCISION OF NECK MASS, EGD, AND FLEXIBLE BRONCHOSCOPY;  Surgeon: Hemanth La MD;  Location: [facility];  Service: Thoracic;  Laterality: N/A;        Home Meds:  Medications Prior to Admission   Medication Sig Dispense Refill Last Dose    albuterol sulfate  (90 Base) MCG/ACT inhaler Inhale 2 puffs Every 4 (Four) Hours As Needed for Wheezing.       Fluticasone-Salmeterol (ADVAIR/WIXELA) 250-50 MCG/ACT DISKUS Inhale 1 puff 2 (Two) Times a Day.   8/24/2023    montelukast (SINGULAIR) 10 MG tablet Take 1 tablet by mouth Every Night.          Allergies:  Allergies   Allergen Reactions    Vancomycin Rash       Social  "History:   Social History     Socioeconomic History    Marital status:    Tobacco Use    Smoking status: Never     Passive exposure: Never    Smokeless tobacco: Never   Vaping Use    Vaping Use: Never used   Substance and Sexual Activity    Alcohol use: Never    Drug use: Never    Sexual activity: Yes     Partners: Male     Birth control/protection: None       Family History:  Family History   Problem Relation Age of Onset    Cancer Mother     Cancer Paternal Aunt        Physical Exam:  BP 93/57   Pulse 97   Temp 98.9 °F (37.2 °C) (Oral)   Resp 18   Ht 175.3 cm (69\")   Wt 65.3 kg (143 lb 15.4 oz)   LMP 08/17/2023 (Approximate)   SpO2 96%   BMI 21.26 kg/m²  Body mass index is 21.26 kg/m². 96% 65.3 kg (143 lb 15.4 oz)  Physical Exam  Patient is examined using the personal protective equipment as per guidelines from infection control for this particular patient as enacted.  Hand hygiene was performed before and after patient interaction.  Well-developed normal body habitus  Eyes normal conjunctivae and pupils reactive to light  ENT Mallampati between 3 and 4 normal nasal exam  Neck midline trachea no thyromegaly  Chest diminished breath sounds occasional rhonchi bilaterally in the bases  CVS regular rate and rhythm no lower extremity edema  Abdomen soft nontender no hepatosplenomegaly  CNS intact normal sensory exam  Skin no rashes no nodules  Psych oriented to time place and person normal memory  Musculoskeletal no cyanosis no clubbing normal range of motion        LABS:  Lab Results   Component Value Date    CALCIUM 9.1 08/24/2023     Results from last 7 days   Lab Units 08/24/23  1009   SODIUM mmol/L 138   POTASSIUM mmol/L 3.8   CHLORIDE mmol/L 105   CO2 mmol/L 24.4   BUN mg/dL 5*   CREATININE mg/dL 0.65   GLUCOSE mg/dL 108*   CALCIUM mg/dL 9.1   WBC 10*3/mm3 16.62*   HEMOGLOBIN g/dL 12.8   PLATELETS 10*3/mm3 347   PROCALCITONIN ng/mL 0.08     Lab Results   Component Value Date    TROPONINT <6 " 08/24/2023     Results from last 7 days   Lab Units 08/24/23  1229 08/24/23  1009   HSTROP T ng/L <6 <6         Results from last 7 days   Lab Units 08/24/23  1452 08/24/23  1009   PROCALCITONIN ng/mL  --  0.08   LACTATE mmol/L 1.0  --                      Lab Results   Component Value Date    TSH 1.860 04/11/2023     Estimated Creatinine Clearance: 124.5 mL/min (by C-G formula based on SCr of 0.65 mg/dL).         Imaging: I personally visualized the images of scans/x-rays performed within last 3 days.      Assessment:  Pneumonia  Right paraesophageal collection/questionable abscess  Leukocytosis  History of mediastinitis  History of asthma      Recommendations:  At this time we have a young female with known history of mediastinitis with possible recurrence.  Will initiate broad-spectrum antibiotic Zosyn.  She has some allergy to vancomycin therefore will hold off and see what ID has to suggest  Consult infectious disease to guide antibiotic management  Thoracic surgery is already been consulted  She is currently on room air with minimal pneumonia noted on CT chest with minimal pneumonia symptoms  We will follow-up after thoracic surgery's recommendation              Mora Friedman MD  8/24/2023  17:16 EDT      Much of this encounter note is an electronic transcription/translation of spoken language to printed text using Dragon Software.

## 2023-08-24 NOTE — H&P
Patient Name:  Wilda Mayo  YOB: 1985  MRN:  [id number]  Admit Date:  8/24/2023  Patient Care Team:  Provider, No Known as PCP - General      Subjective   History Present Illness     Chief Complaint   Patient presents with    Cough    URI         History of Present Illness  This is a 37-year-old female, presents to the emergency room, has past medical history significant for asthma, pneumonia, mediastinitis, complains of right-sided chest discomfort, mild shortness of breath, she also complains of chest and neck pain, similar to episode of an infectious/inflammatory process in her chest back in April that required surgical intervention by thoracic surgery.  Patient will be admitted to hospitalist service for further work-up of symptoms.        Review of Systems     Review of Systems   Constitutional: Negative for activity change and appetite change.   HENT: Negative for congestion and dental problem.    Eyes: Negative for discharge and itching.   Respiratory: Negative for apnea and chest tightness.    Cardiovascular: Negative for palpitations.   Gastrointestinal: Negative for abdominal distention and abdominal pain.   Endocrine: Negative for cold intolerance and heat intolerance.   Genitourinary: Negative for difficulty urinating and frequency.   Musculoskeletal: Negative for arthralgias and back pain.   Skin: Negative for color change and pallor.   Allergic/Immunologic: Negative for environmental allergies and food allergies.   Neurological: Negative for dizziness and facial asymmetry.   Hematological: Negative for adenopathy. Does not bruise/bleed easily.   Psychiatric/Behavioral: Negative for agitation and suicidal ideas.       Personal History     Past Medical History:   Diagnosis Date    Asthma     Pneumonia 8/24/2023     Past Surgical History:   Procedure Laterality Date    ESOPHAGUS SURGERY N/A 04/11/2023    Procedure: EXPLORATION OF NECK, EXCISION OF NECK MASS, EGD, AND FLEXIBLE  BRONCHOSCOPY;  Surgeon: Hemanth La MD;  Location: [facility] MAIN OR;  Service: Thoracic;  Laterality: N/A;     Family History   Problem Relation Age of Onset    Cancer Mother     Cancer Paternal Aunt      Social History     Tobacco Use    Smoking status: Never     Passive exposure: Never    Smokeless tobacco: Never   Vaping Use    Vaping Use: Never used   Substance Use Topics    Alcohol use: Never    Drug use: Never     No current facility-administered medications on file prior to encounter.     Current Outpatient Medications on File Prior to Encounter   Medication Sig Dispense Refill    albuterol sulfate  (90 Base) MCG/ACT inhaler Inhale 2 puffs Every 4 (Four) Hours As Needed for Wheezing.      Fluticasone-Salmeterol (ADVAIR/WIXELA) 250-50 MCG/ACT DISKUS Inhale 1 puff 2 (Two) Times a Day.      montelukast (SINGULAIR) 10 MG tablet Take 1 tablet by mouth Every Night.       Allergies   Allergen Reactions    Vancomycin Rash       Objective    Objective     Vital Signs  Temp:  [98.4 °F (36.9 °C)-101.4 °F (38.6 °C)] 98.9 °F (37.2 °C)  Heart Rate:  [] 97  Resp:  [18] 18  BP: ()/(57-77) 93/57  SpO2:  [95 %-100 %] 96 %  on   ;   Device (Oxygen Therapy): room air  Body mass index is 21.26 kg/m².    Physical Exam  General, awake and alert.  Head and ENT, normocephalic and atraumatic.  Neck scar noted  Lungs, symmetric expansion, equal air entry bilaterally.  Heart, regular rate and rhythm.  Abdomen, soft and nontender.  Extremities, no clubbing or cyanosis.  Neuro, no focal deficits.  Skin: Warm and no rash.  Psych, normal mood and affect.  Musculoskeletal, joint examination is grossly normal.    Results Review:  I reviewed the patient's new clinical results.  I reviewed the patient's new imaging results and agree with the interpretation.  I reviewed the patient's other test results and agree with the interpretation  I personally viewed and interpreted the patient's EKG/Telemetry data  Discussed with ED  provider.    Lab Results (last 24 hours)       Procedure Component Value Units Date/Time    COVID-19,[facility] IN-HOUSE CEPHEID/CHINYERE NP SWAB IN TRANSPORT MEDIA 8-12 HR TAT - Swab, Nasopharynx [[id number]]  (Normal) Collected: 08/24/23 1005    Specimen: Swab from Nasopharynx Updated: 08/24/23 1055     COVID19 Not Detected    Narrative:      Fact sheet for providers: https://www.fda.gov/media/654526/download     Fact sheet for patients: https://www.fda.gov/media/813321/download    CBC & Differential [[id number]]  (Abnormal) Collected: 08/24/23 1009    Specimen: Blood Updated: 08/24/23 1023    Narrative:      The following orders were created for panel order CBC & Differential.  Procedure                               Abnormality         Status                     ---------                               -----------         ------                     CBC Auto Differential[[id number]]        Abnormal            Final result                 Please view results for these tests on the individual orders.    Basic Metabolic Panel [[id number]]  (Abnormal) Collected: 08/24/23 1009    Specimen: Blood Updated: 08/24/23 1043     Glucose 108 mg/dL      BUN 5 mg/dL      Creatinine 0.65 mg/dL      Sodium 138 mmol/L      Potassium 3.8 mmol/L      Chloride 105 mmol/L      CO2 24.4 mmol/L      Calcium 9.1 mg/dL      BUN/Creatinine Ratio 7.7     Anion Gap 8.6 mmol/L      eGFR 117.9 mL/min/1.73     Narrative:      GFR Normal >60  Chronic Kidney Disease <60  Kidney Failure <15      hCG, Serum, Qualitative [[id number]]  (Normal) Collected: 08/24/23 1009    Specimen: Blood Updated: 08/24/23 1038     HCG Qualitative Negative    High Sensitivity Troponin T [[id number]]  (Normal) Collected: 08/24/23 1009    Specimen: Blood Updated: 08/24/23 1050     HS Troponin T <6 ng/L     Narrative:      High Sensitive Troponin T Reference Range:  <10.0 ng/L- Negative Female for AMI  <15.0 ng/L- Negative Male for AMI  >=10 - Abnormal Female indicating possible  "myocardial injury.  >=15 - Abnormal Male indicating possible myocardial injury.   Clinicians would have to utilize clinical acumen, EKG, Troponin, and serial changes to determine if it is an Acute Myocardial Infarction or myocardial injury due to an underlying chronic condition.         Procalcitonin [[id number]]  (Normal) Collected: 08/24/23 1009    Specimen: Blood Updated: 08/24/23 1050     Procalcitonin 0.08 ng/mL     Narrative:      As a Marker for Sepsis (Non-Neonates):    1. <0.5 ng/mL represents a low risk of severe sepsis and/or septic shock.  2. >2 ng/mL represents a high risk of severe sepsis and/or septic shock.    As a Marker for Lower Respiratory Tract Infections that require antibiotic therapy:    PCT on Admission    Antibiotic Therapy       6-12 Hrs later    >0.5                Strongly Recommended  >0.25 - <0.5        Recommended   0.1 - 0.25          Discouraged              Remeasure/reassess PCT  <0.1                Strongly Discouraged     Remeasure/reassess PCT    As 28 day mortality risk marker: \"Change in Procalcitonin Result\" (>80% or <=80%) if Day 0 (or Day 1) and Day 4 values are available. Refer to http://www.[facility]-pct-calculator.com    Change in PCT <=80%  A decrease of PCT levels below or equal to 80% defines a positive change in PCT test result representing a higher risk for 28-day all-cause mortality of patients diagnosed with severe sepsis for septic shock.    Change in PCT >80%  A decrease of PCT levels of more than 80% defines a negative change in PCT result representing a lower risk for 28-day all-cause mortality of patients diagnosed with severe sepsis or septic shock.       CBC Auto Differential [[id number]]  (Abnormal) Collected: 08/24/23 1009    Specimen: Blood Updated: 08/24/23 1023     WBC 16.62 10*3/mm3      RBC 4.33 10*6/mm3      Hemoglobin 12.8 g/dL      Hematocrit 39.2 %      MCV 90.5 fL      MCH 29.6 pg      MCHC 32.7 g/dL      RDW 12.7 %      RDW-SD 41.9 fl      MPV 9.6 " fL      Platelets 347 10*3/mm3      Neutrophil % 76.1 %      Lymphocyte % 12.0 %      Monocyte % 6.5 %      Eosinophil % 4.6 %      Basophil % 0.4 %      Immature Grans % 0.4 %      Neutrophils, Absolute 12.65 10*3/mm3      Lymphocytes, Absolute 2.00 10*3/mm3      Monocytes, Absolute 1.08 10*3/mm3      Eosinophils, Absolute 0.76 10*3/mm3      Basophils, Absolute 0.06 10*3/mm3      Immature Grans, Absolute 0.07 10*3/mm3      nRBC 0.0 /100 WBC     High Sensitivity Troponin T 2Hr [[id number]] Collected: 08/24/23 1229    Specimen: Blood Updated: 08/24/23 1301     HS Troponin T <6 ng/L      Troponin T Delta --     Comment: Unable to calculate.       Narrative:      High Sensitive Troponin T Reference Range:  <10.0 ng/L- Negative Female for AMI  <15.0 ng/L- Negative Male for AMI  >=10 - Abnormal Female indicating possible myocardial injury.  >=15 - Abnormal Male indicating possible myocardial injury.   Clinicians would have to utilize clinical acumen, EKG, Troponin, and serial changes to determine if it is an Acute Myocardial Infarction or myocardial injury due to an underlying chronic condition.         Blood Culture - Blood, Arm, Left [[id number]] Collected: 08/24/23 1452    Specimen: Blood from Arm, Left Updated: 08/24/23 1515    Lactic Acid, Plasma [[id number]]  (Normal) Collected: 08/24/23 1452    Specimen: Blood from Arm, Left Updated: 08/24/23 1532     Lactate 1.0 mmol/L     Blood Culture - Blood, Arm, Left [[id number]] Collected: 08/24/23 1456    Specimen: Blood from Arm, Left Updated: 08/24/23 1515            Imaging Results (Last 24 Hours)       Procedure Component Value Units Date/Time    FL Esophagram Complete Single Contrast [[id number]] Collected: 08/24/23 1447     Updated: 08/24/23 1455    Narrative:      ESOPHAGRAM 8/24/2023     HISTORY: Evaluate for esophageal leak.      image of the chest shows nothing acute.     Single contrast esophagram was performed with Gastrografin. No  aspiration is seen. Normal  swallowing mechanism is demonstrated. No  esophageal strictures, reflux, hiatal hernia or evidence of esophagitis  is seen. There is no evidence of contrast leak. The upper thoracic  esophagus appears deviated minimally to the left and there may be some  minimal mass effect on the rightward aspect of the esophagus.       Impression:      1. No evidence of esophageal perforation.     Fluoroscopy time 56 seconds 66 images 809 dose area product     This report was finalized on 8/24/2023 2:50 PM by Dr. Reed Wharton M.D.       CT Angiogram Chest Pulmonary Embolism [[id number]] Collected: 08/24/23 1209     Updated: 08/24/23 1214    Narrative:      CT ANGIOGRAM OF THE CHEST. MULTIPLE CORONAL, SAGITTAL, AND 3-D  RECONSTRUCTIONS.     HISTORY: 37-year-old female with chest pain and cough.     TECHNIQUE: Radiation dose reduction techniques were utilized, including  automated exposure control and exposure modulation based on body size.   CT angiogram of the chest was performed following the administration of  IV contrast. Multiple coronal, sagittal, and 3-D reconstruction images  were obtained. Compared with chest CTA and neck CT 04/11/2023. Also  correlated with esophagram 04/11/2023.     FINDINGS: There is no evidence for pulmonary thromboemboli. Again seen  is a complex air-containing mass/collection in the superior right  paraesophageal region at the thoracic inlet and superior mediastinum.  The margins were better seen on the neck CT and appear without  significant change since the chest CTA performed the same day. There is  mass effect on the on the trachea and esophagus to the left. The  measurements are approximately 3.1 x 2.6 cm which is grossly unchanged.  The esophageal wall is indistinct and there is indistinct increased  density throughout the mediastinum and sherwin without discrete  pathologically enlarged nodes. There are no pleural or pericardial  effusions. There is mild asymmetric bronchial thickening at  the right  lower lobe which was not definitively present previously and there are  new ill-defined mixed-density peribronchial airspace opacities in the  perihilar region of the left lower lobe and at both lung bases. There is  also ill-defined ground-glass opacification at the right lower lobe.       Impression:      Outside facility neck CT 5/5/2023 has become available for  comparison. This collection was predominantly air-filled on the outside  facility neck CT. A RECURRENT INFECTED RIGHT PARATRACHEAL CYST IS  SUSPECTED TO BE THE ETIOLOGY FOR THESE FINDINGS.        1. Persistent approximately 3.1 x 2.6 cm complex collection/mass  containing air in the superior right paraesophageal region. As discussed  previously, an infectious or inflammatory etiology is suspected, but the  etiology is uncertain and malignancy cannot be entirely excluded. There  is mass effect on the trachea and esophagus to the left. No definite  esophageal leak was seen on the previous esophagram. Thoracic surgery  consultation is recommended.  2. The new bilateral lower lobe airspace opacities as described likely  represent bronchiolitis/developing pneumonia and aspiration is a  consideration.  3. There is no evidence for pulmonary thromboemboli.                         ECG 12 Lead Chest Pain   Final Result   HEART RATE= 97  bpm   RR Interval= 619  ms   NE Interval= 141  ms   P Horizontal Axis= 14  deg   P Front Axis= 57  deg   QRSD Interval= 82  ms   QT Interval= 343  ms   QRS Axis= 66  deg   T Wave Axis= -16  deg   - BORDERLINE ECG -   Sinus rhythm   Borderline T abnormalities, inferior leads   When compared with ECG of 11-Apr-2023 12:42:17,   No significant change   Electronically Signed By: David Pierre ([facility]) 24-Aug-2023 14:30:12   Date and Time of Study: 2023-08-24 10:17:45           Assessment/Plan     Active Hospital Problems    Diagnosis  POA    **Pneumonia [J18.9]  Yes    Neck pain [M54.2]  Yes    Asthma [J45.909]  Yes    Paratracheal  gas collection [R93.89]  Yes      Resolved Hospital Problems   No resolved problems to display.       Assessment and plan  1.  Complex collection/mass containing air in the right superior paraesophageal region.  There is mass effect on trachea and esophagus on the left.  Thoracic surgery consulted for further input and management.    2.  Pneumonia, initiate antibiotics, patient would benefit from pulmonary evaluation.  Infectious disease consultation will also be requested.    3.  Paraesophageal mass, keep patient n.p.o. with no sips, ice chips or oral meds.    4.  History of asthma, chronic condition, does not appear to be in exacerbation.    5.  CODE STATUS is full code.  Further plans based on hospital course.       Juan Estrada MD  [facility]  08/24/23  16:47 EDT

## 2023-08-24 NOTE — CONSULTS
Inpatient Thoracic Surgery Consult  Consult performed by: Beverly Araya APRN  Consult ordered by: Kane Wood MD        Patient Care Team:  Provider, No Known as PCP - General    Chief Complaint   Patient presents with    Cough    URI       Subjective     History of Present Illness    Wilda Mayo is a 37-year-old female who presents to [facility] complaining of a progressive cough with pain radiating from her anterior chest posteriorly.  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 Hemanth La.  At that time there was no evidence of fluid collection, esophageal or tracheal diverticulum or abnormality.  She did have an edematous plane which was suggestive of inflammation.  The patient was kept on antibiotics postoperatively and her leukocytosis improved.  There is suspicion that she likely can a contained perforation from unclear hollow viscus that led to cervical inflammation and mediastinitis.  The patient recovered well from her procedure.  She actually traveled to [place] recently.  She has had no recent sick contacts.  She developed a fever over the last couple of days and has had a cough which is nonproductive but causes significant pain in her chest which radiates posteriorly.  She represented to the emergency department today.  CT angiogram demonstrated no evidence of thromboembolus but there was evidence of recurrence of this paraesophageal mass/abnormality with mass effect on the esophagus and trachea.  Additionally, the patient appears to have developing pneumonia, as well.  The patient reports some mild nausea but no vomiting.  She denies difficulty with swallowing.  She reports no hemoptysis.  She was seen in the emergency department.  Her significant other is at bedside.    Review of Systems   Constitutional:  Positive for activity change,  diaphoresis and fever.   HENT:  Negative for trouble swallowing.    Respiratory:  Positive for cough. Negative for shortness of breath, wheezing and stridor.    Cardiovascular:  Positive for chest pain. Negative for palpitations and leg swelling.   Gastrointestinal:  Positive for nausea. Negative for constipation, diarrhea and vomiting.   Genitourinary: Negative.    Skin: Negative.       Past Medical History:   Diagnosis Date    Asthma     Pneumonia 8/24/2023     Past Surgical History:   Procedure Laterality Date    ESOPHAGUS SURGERY N/A 04/11/2023    Procedure: EXPLORATION OF NECK, EXCISION OF NECK MASS, EGD, AND FLEXIBLE BRONCHOSCOPY;  Surgeon: Hemanth La MD;  Location: [facility] MAIN OR;  Service: Thoracic;  Laterality: N/A;     Family History   Problem Relation Age of Onset    Cancer Mother     Cancer Paternal Aunt      Social History     Socioeconomic History    Marital status:    Tobacco Use    Smoking status: Never     Passive exposure: Never    Smokeless tobacco: Never   Vaping Use    Vaping Use: Never used   Substance and Sexual Activity    Alcohol use: Never    Drug use: Never    Sexual activity: Yes     Partners: Male     Birth control/protection: None     (Not in a hospital admission)    Allergies   Allergen Reactions    Vancomycin Rash       Objective      Vital Signs  Temp:  [98.4 °F (36.9 °C)-99.6 °F (37.6 °C)] 98.4 °F (36.9 °C)  Heart Rate:  [] 92  Resp:  [18] 18  BP: (101-114)/(68-74) 108/69    Intake & Output (last day)         08/23 0701  08/24 0700 08/24 0701  08/25 0700    IV Piggyback  1000    Total Intake(mL/kg)  1000 (14.6)    Net  +1000                  Physical Exam  Vitals reviewed.   Constitutional:       Appearance: She is ill-appearing.   HENT:      Head: Normocephalic and atraumatic.   Eyes:      General: No scleral icterus.     Conjunctiva/sclera: Conjunctivae normal.   Cardiovascular:      Rate and Rhythm: Normal rate and regular rhythm.   Pulmonary:      Effort:  Pulmonary effort is normal.      Breath sounds: No wheezing, rhonchi or rales.   Abdominal:      General: Bowel sounds are normal.      Palpations: Abdomen is soft. There is no mass.      Tenderness: There is no abdominal tenderness.   Musculoskeletal:      Cervical back: Neck supple.   Skin:     General: Skin is warm and dry.   Neurological:      General: No focal deficit present.      Mental Status: She is alert. Mental status is at baseline.   Psychiatric:         Mood and Affect: Mood normal.         Behavior: Behavior normal.         Thought Content: Thought content normal.         Judgment: Judgment normal.       Results Review:    I reviewed the patient's new clinical results.  I reviewed the patient's new imaging results and agree with the interpretation.  I reviewed the patient's other test results and agree with the interpretation  Discussed with patient, RN, Dr. La and Dr. Velasquez.    Imaging Results (Last 24 Hours)       Procedure Component Value Units Date/Time    FL Esophagram Complete Single Contrast [[id number]] Resulted: 08/24/23 1346     Updated: 08/24/23 1420    CT Angiogram Chest Pulmonary Embolism [[id number]] Collected: 08/24/23 1209     Updated: 08/24/23 1214    Narrative:      CT ANGIOGRAM OF THE CHEST. MULTIPLE CORONAL, SAGITTAL, AND 3-D  RECONSTRUCTIONS.     HISTORY: 37-year-old female with chest pain and cough.     TECHNIQUE: Radiation dose reduction techniques were utilized, including  automated exposure control and exposure modulation based on body size.   CT angiogram of the chest was performed following the administration of  IV contrast. Multiple coronal, sagittal, and 3-D reconstruction images  were obtained. Compared with chest CTA and neck CT 04/11/2023. Also  correlated with esophagram 04/11/2023.     FINDINGS: There is no evidence for pulmonary thromboemboli. Again seen  is a complex air-containing mass/collection in the superior right  paraesophageal region at the thoracic inlet  and superior mediastinum.  The margins were better seen on the neck CT and appear without  significant change since the chest CTA performed the same day. There is  mass effect on the on the trachea and esophagus to the left. The  measurements are approximately 3.1 x 2.6 cm which is grossly unchanged.  The esophageal wall is indistinct and there is indistinct increased  density throughout the mediastinum and sherwin without discrete  pathologically enlarged nodes. There are no pleural or pericardial  effusions. There is mild asymmetric bronchial thickening at the right  lower lobe which was not definitively present previously and there are  new ill-defined mixed-density peribronchial airspace opacities in the  perihilar region of the left lower lobe and at both lung bases. There is  also ill-defined ground-glass opacification at the right lower lobe.       Impression:      Outside facility neck CT 5/5/2023 has become available for  comparison. This collection was predominantly air-filled on the outside  facility neck CT. A RECURRENT INFECTED RIGHT PARATRACHEAL CYST IS  SUSPECTED TO BE THE ETIOLOGY FOR THESE FINDINGS.        1. Persistent approximately 3.1 x 2.6 cm complex collection/mass  containing air in the superior right paraesophageal region. As discussed  previously, an infectious or inflammatory etiology is suspected, but the  etiology is uncertain and malignancy cannot be entirely excluded. There  is mass effect on the trachea and esophagus to the left. No definite  esophageal leak was seen on the previous esophagram. Thoracic surgery  consultation is recommended.  2. The new bilateral lower lobe airspace opacities as described likely  represent bronchiolitis/developing pneumonia and aspiration is a  consideration.  3. There is no evidence for pulmonary thromboemboli.                     Lab Results:  Lab Results (last 24 hours)       Procedure Component Value Units Date/Time    High Sensitivity Troponin T 2Hr  [[id number]] Collected: 08/24/23 1229    Specimen: Blood Updated: 08/24/23 1301     HS Troponin T <6 ng/L      Troponin T Delta --     Comment: Unable to calculate.       Narrative:      High Sensitive Troponin T Reference Range:  <10.0 ng/L- Negative Female for AMI  <15.0 ng/L- Negative Male for AMI  >=10 - Abnormal Female indicating possible myocardial injury.  >=15 - Abnormal Male indicating possible myocardial injury.   Clinicians would have to utilize clinical acumen, EKG, Troponin, and serial changes to determine if it is an Acute Myocardial Infarction or myocardial injury due to an underlying chronic condition.         COVID-19,[facility] KIAN IN-HOUSE CEPHEID/CHINYERE NP SWAB IN TRANSPORT MEDIA 8-12 HR TAT - Swab, Nasopharynx [[id number]]  (Normal) Collected: 08/24/23 1005    Specimen: Swab from Nasopharynx Updated: 08/24/23 1055     COVID19 Not Detected    Narrative:      Fact sheet for providers: https://www.fda.gov/media/953064/download     Fact sheet for patients: https://www.fda.gov/media/424107/download    High Sensitivity Troponin T [[id number]]  (Normal) Collected: 08/24/23 1009    Specimen: Blood Updated: 08/24/23 1050     HS Troponin T <6 ng/L     Narrative:      High Sensitive Troponin T Reference Range:  <10.0 ng/L- Negative Female for AMI  <15.0 ng/L- Negative Male for AMI  >=10 - Abnormal Female indicating possible myocardial injury.  >=15 - Abnormal Male indicating possible myocardial injury.   Clinicians would have to utilize clinical acumen, EKG, Troponin, and serial changes to determine if it is an Acute Myocardial Infarction or myocardial injury due to an underlying chronic condition.         Procalcitonin [[id number]]  (Normal) Collected: 08/24/23 1009    Specimen: Blood Updated: 08/24/23 1050     Procalcitonin 0.08 ng/mL     Narrative:      As a Marker for Sepsis (Non-Neonates):    1. <0.5 ng/mL represents a low risk of severe sepsis and/or septic shock.  2. >2 ng/mL represents a high risk of severe  "sepsis and/or septic shock.    As a Marker for Lower Respiratory Tract Infections that require antibiotic therapy:    PCT on Admission    Antibiotic Therapy       6-12 Hrs later    >0.5                Strongly Recommended  >0.25 - <0.5        Recommended   0.1 - 0.25          Discouraged              Remeasure/reassess PCT  <0.1                Strongly Discouraged     Remeasure/reassess PCT    As 28 day mortality risk marker: \"Change in Procalcitonin Result\" (>80% or <=80%) if Day 0 (or Day 1) and Day 4 values are available. Refer to http://www.[facility]-pct-calculator.com    Change in PCT <=80%  A decrease of PCT levels below or equal to 80% defines a positive change in PCT test result representing a higher risk for 28-day all-cause mortality of patients diagnosed with severe sepsis for septic shock.    Change in PCT >80%  A decrease of PCT levels of more than 80% defines a negative change in PCT result representing a lower risk for 28-day all-cause mortality of patients diagnosed with severe sepsis or septic shock.       Basic Metabolic Panel [[id number]]  (Abnormal) Collected: 08/24/23 1009    Specimen: Blood Updated: 08/24/23 1043     Glucose 108 mg/dL      BUN 5 mg/dL      Creatinine 0.65 mg/dL      Sodium 138 mmol/L      Potassium 3.8 mmol/L      Chloride 105 mmol/L      CO2 24.4 mmol/L      Calcium 9.1 mg/dL      BUN/Creatinine Ratio 7.7     Anion Gap 8.6 mmol/L      eGFR 117.9 mL/min/1.73     Narrative:      GFR Normal >60  Chronic Kidney Disease <60  Kidney Failure <15      hCG, Serum, Qualitative [[id number]]  (Normal) Collected: 08/24/23 1009    Specimen: Blood Updated: 08/24/23 1038     HCG Qualitative Negative    CBC & Differential [[id number]]  (Abnormal) Collected: 08/24/23 1009    Specimen: Blood Updated: 08/24/23 1023    Narrative:      The following orders were created for panel order CBC & Differential.  Procedure                               Abnormality         Status                     ---------     "                           -----------         ------                     CBC Auto Differential[[id number]]        Abnormal            Final result                 Please view results for these tests on the individual orders.    CBC Auto Differential [[id number]]  (Abnormal) Collected: 08/24/23 1009    Specimen: Blood Updated: 08/24/23 1023     WBC 16.62 10*3/mm3      RBC 4.33 10*6/mm3      Hemoglobin 12.8 g/dL      Hematocrit 39.2 %      MCV 90.5 fL      MCH 29.6 pg      MCHC 32.7 g/dL      RDW 12.7 %      RDW-SD 41.9 fl      MPV 9.6 fL      Platelets 347 10*3/mm3      Neutrophil % 76.1 %      Lymphocyte % 12.0 %      Monocyte % 6.5 %      Eosinophil % 4.6 %      Basophil % 0.4 %      Immature Grans % 0.4 %      Neutrophils, Absolute 12.65 10*3/mm3      Lymphocytes, Absolute 2.00 10*3/mm3      Monocytes, Absolute 1.08 10*3/mm3      Eosinophils, Absolute 0.76 10*3/mm3      Basophils, Absolute 0.06 10*3/mm3      Immature Grans, Absolute 0.07 10*3/mm3      nRBC 0.0 /100 WBC                 Assessment & Plan       Pneumonia    Paraesophageal mass    Assessment & Plan    Independently reviewed the CT angiogram which demonstrates a complex collection/mass containing air in the right superior paraesophageal region.  This measures 3.1 x 2.6 cm.  There is mass effect on the trachea and esophagus to the left.  New bilateral lower lobe airspace opacities demonstrated bronchiolitis/developing pneumonia with aspiration is a consideration.  No evidence of pulmonary thromboemboli.  Esophagram was also independently reviewed which demonstrates no evidence of leak/extravasation.    Paraesophageal mass: This appears to have a mass effect on the trachea and esophagus which both have a shift to the left.  This is also evident on esophagram but there is no evidence of perforation.  We will plan a CT of the soft tissues of the neck for further evaluation.  We will keep the patient strict n.p.o. with no sips, ice chips or oral  meds.    Pneumonia: Cannot rule out aspiration secondary to this compression of the esophagus.  Antibiotic treatment per infectious disease service.    I discussed the patients findings and our recommendations with patient, family, nursing staff, and Dr. Velasquez.    Thank you for this consult and allowing us to participate in the care of your patient.  We will follow along with you during this hospitalization.       ANNETTE Waller  [facility]  08/24/23  14:24 EDT    Greater than 72 minutes was spent reviewing the patient's chart, radiographic imaging, labs, provider notes, assessing the patient and developing a plan of care which was discussed with the patient/family and other providers.

## 2023-08-24 NOTE — Clinical Note
[facility] EMERGENCY DEPARTMENT  [address] [place] [address]  Phone: [phone]    Noe Otis accompanied Wilda Mayo to the emergency department on 8/24/2023. They may return to work on 08/25/2023.        Thank you for choosing [facility].    Hernan Hernandez RN

## 2023-08-24 NOTE — PROGRESS NOTES
Clinical Pharmacy Services: Medication History    Wilda Mayo is a 37 y.o. female presenting to [facility] for   Chief Complaint   Patient presents with    Cough    URI       She  has a past medical history of Asthma and Pneumonia (8/24/2023).    Allergies as of 08/24/2023 - Reviewed 08/24/2023   Allergen Reaction Noted    Vancomycin Rash 04/12/2023       Medication information was obtained from: Patient   Pharmacy and Phone Number:     Prior to Admission Medications       Prescriptions Last Dose Informant Patient Reported? Taking?    albuterol sulfate  (90 Base) MCG/ACT inhaler  Self Yes Yes    Inhale 2 puffs Every 4 (Four) Hours As Needed for Wheezing.    Fluticasone-Salmeterol (ADVAIR/WIXELA) 250-50 MCG/ACT DISKUS 8/24/2023 Self Yes Yes    Inhale 1 puff 2 (Two) Times a Day.    montelukast (SINGULAIR) 10 MG tablet  Self Yes Yes    Take 1 tablet by mouth Every Night.              Medication notes:     This medication list is complete to the best of my knowledge as of 8/24/2023    Please call if questions.    Herbert Munguia  Medication History Technician   [phone]    8/24/2023 14:47 EDT

## 2023-08-25 ENCOUNTER — PREP FOR SURGERY (OUTPATIENT)
Dept: OTHER | Facility: HOSPITAL | Age: 38
End: 2023-08-25
Payer: COMMERCIAL

## 2023-08-25 ENCOUNTER — APPOINTMENT (OUTPATIENT)
Dept: CT IMAGING | Facility: HOSPITAL | Age: 38
DRG: 178 | End: 2023-08-25
Payer: COMMERCIAL

## 2023-08-25 PROBLEM — B34.8 INFECTION DUE TO PARAINFLUENZA VIRUS 4: Status: ACTIVE | Noted: 2023-08-25

## 2023-08-25 LAB
ALBUMIN SERPL-MCNC: 3.2 G/DL (ref 3.5–5.2)
ALBUMIN/GLOB SERPL: 1.2 G/DL
ALP SERPL-CCNC: 78 U/L (ref 39–117)
ALT SERPL W P-5'-P-CCNC: 28 U/L (ref 1–33)
ANION GAP SERPL CALCULATED.3IONS-SCNC: 13.7 MMOL/L (ref 5–15)
AST SERPL-CCNC: 12 U/L (ref 1–32)
B PARAPERT DNA SPEC QL NAA+PROBE: NOT DETECTED
B PERT DNA SPEC QL NAA+PROBE: NOT DETECTED
BASOPHILS # BLD AUTO: 0.06 10*3/MM3 (ref 0–0.2)
BASOPHILS NFR BLD AUTO: 0.3 % (ref 0–1.5)
BILIRUB SERPL-MCNC: 0.4 MG/DL (ref 0–1.2)
BUN SERPL-MCNC: 8 MG/DL (ref 6–20)
BUN/CREAT SERPL: 12.5 (ref 7–25)
C PNEUM DNA NPH QL NAA+NON-PROBE: NOT DETECTED
CALCIUM SPEC-SCNC: 8.4 MG/DL (ref 8.6–10.5)
CHLORIDE SERPL-SCNC: 108 MMOL/L (ref 98–107)
CO2 SERPL-SCNC: 20.3 MMOL/L (ref 22–29)
CREAT SERPL-MCNC: 0.64 MG/DL (ref 0.57–1)
DEPRECATED RDW RBC AUTO: 40 FL (ref 37–54)
EGFRCR SERPLBLD CKD-EPI 2021: 118.4 ML/MIN/1.73
EOSINOPHIL # BLD AUTO: 0.29 10*3/MM3 (ref 0–0.4)
EOSINOPHIL NFR BLD AUTO: 1.6 % (ref 0.3–6.2)
ERYTHROCYTE [DISTWIDTH] IN BLOOD BY AUTOMATED COUNT: 12.3 % (ref 12.3–15.4)
FLUAV SUBTYP SPEC NAA+PROBE: NOT DETECTED
FLUBV RNA ISLT QL NAA+PROBE: NOT DETECTED
GLOBULIN UR ELPH-MCNC: 2.7 GM/DL
GLUCOSE SERPL-MCNC: 84 MG/DL (ref 65–99)
HADV DNA SPEC NAA+PROBE: NOT DETECTED
HCOV 229E RNA SPEC QL NAA+PROBE: NOT DETECTED
HCOV HKU1 RNA SPEC QL NAA+PROBE: NOT DETECTED
HCOV NL63 RNA SPEC QL NAA+PROBE: NOT DETECTED
HCOV OC43 RNA SPEC QL NAA+PROBE: NOT DETECTED
HCT VFR BLD AUTO: 33.7 % (ref 34–46.6)
HGB BLD-MCNC: 11.3 G/DL (ref 12–15.9)
HMPV RNA NPH QL NAA+NON-PROBE: NOT DETECTED
HPIV1 RNA ISLT QL NAA+PROBE: NOT DETECTED
HPIV2 RNA SPEC QL NAA+PROBE: NOT DETECTED
HPIV3 RNA NPH QL NAA+PROBE: NOT DETECTED
HPIV4 P GENE NPH QL NAA+PROBE: DETECTED
IMM GRANULOCYTES # BLD AUTO: 0.11 10*3/MM3 (ref 0–0.05)
IMM GRANULOCYTES NFR BLD AUTO: 0.6 % (ref 0–0.5)
LYMPHOCYTES # BLD AUTO: 1.76 10*3/MM3 (ref 0.7–3.1)
LYMPHOCYTES NFR BLD AUTO: 10 % (ref 19.6–45.3)
M PNEUMO IGG SER IA-ACNC: NOT DETECTED
MCH RBC QN AUTO: 30.1 PG (ref 26.6–33)
MCHC RBC AUTO-ENTMCNC: 33.5 G/DL (ref 31.5–35.7)
MCV RBC AUTO: 89.9 FL (ref 79–97)
MONOCYTES # BLD AUTO: 1.14 10*3/MM3 (ref 0.1–0.9)
MONOCYTES NFR BLD AUTO: 6.4 % (ref 5–12)
NEUTROPHILS NFR BLD AUTO: 14.32 10*3/MM3 (ref 1.7–7)
NEUTROPHILS NFR BLD AUTO: 81.1 % (ref 42.7–76)
NRBC BLD AUTO-RTO: 0 /100 WBC (ref 0–0.2)
PLATELET # BLD AUTO: 346 10*3/MM3 (ref 140–450)
PMV BLD AUTO: 9.5 FL (ref 6–12)
POTASSIUM SERPL-SCNC: 3.6 MMOL/L (ref 3.5–5.2)
PROT SERPL-MCNC: 5.9 G/DL (ref 6–8.5)
RBC # BLD AUTO: 3.75 10*6/MM3 (ref 3.77–5.28)
RHINOVIRUS RNA SPEC NAA+PROBE: NOT DETECTED
RSV RNA NPH QL NAA+NON-PROBE: NOT DETECTED
SARS-COV-2 RNA NPH QL NAA+NON-PROBE: NOT DETECTED
SODIUM SERPL-SCNC: 142 MMOL/L (ref 136–145)
WBC NRBC COR # BLD: 17.68 10*3/MM3 (ref 3.4–10.8)

## 2023-08-25 PROCEDURE — 85025 COMPLETE CBC W/AUTO DIFF WBC: CPT | Performed by: INTERNAL MEDICINE

## 2023-08-25 PROCEDURE — G0378 HOSPITAL OBSERVATION PER HR: HCPCS

## 2023-08-25 PROCEDURE — 25010000002 PIPERACILLIN SOD-TAZOBACTAM PER 1 G: Performed by: INTERNAL MEDICINE

## 2023-08-25 PROCEDURE — 94799 UNLISTED PULMONARY SVC/PX: CPT

## 2023-08-25 PROCEDURE — 99223 1ST HOSP IP/OBS HIGH 75: CPT | Performed by: INTERNAL MEDICINE

## 2023-08-25 PROCEDURE — 94640 AIRWAY INHALATION TREATMENT: CPT

## 2023-08-25 PROCEDURE — 94761 N-INVAS EAR/PLS OXIMETRY MLT: CPT

## 2023-08-25 PROCEDURE — 80053 COMPREHEN METABOLIC PANEL: CPT | Performed by: INTERNAL MEDICINE

## 2023-08-25 PROCEDURE — 70491 CT SOFT TISSUE NECK W/DYE: CPT

## 2023-08-25 PROCEDURE — 25510000001 IOPAMIDOL 61 % SOLUTION: Performed by: HOSPITALIST

## 2023-08-25 PROCEDURE — 0202U NFCT DS 22 TRGT SARS-COV-2: CPT | Performed by: INTERNAL MEDICINE

## 2023-08-25 PROCEDURE — 99232 SBSQ HOSP IP/OBS MODERATE 35: CPT | Performed by: NURSE PRACTITIONER

## 2023-08-25 RX ORDER — DOXYCYCLINE 100 MG/1
100 CAPSULE ORAL EVERY 12 HOURS SCHEDULED
Status: DISCONTINUED | OUTPATIENT
Start: 2023-08-25 | End: 2023-08-25

## 2023-08-25 RX ORDER — IPRATROPIUM BROMIDE AND ALBUTEROL SULFATE 2.5; .5 MG/3ML; MG/3ML
3 SOLUTION RESPIRATORY (INHALATION)
Status: DISCONTINUED | OUTPATIENT
Start: 2023-08-25 | End: 2023-08-29

## 2023-08-25 RX ADMIN — IPRATROPIUM BROMIDE AND ALBUTEROL SULFATE 3 ML: 2.5; .5 SOLUTION RESPIRATORY (INHALATION) at 19:13

## 2023-08-25 RX ADMIN — IPRATROPIUM BROMIDE AND ALBUTEROL SULFATE 3 ML: 2.5; .5 SOLUTION RESPIRATORY (INHALATION) at 15:54

## 2023-08-25 RX ADMIN — Medication 10 ML: at 22:24

## 2023-08-25 RX ADMIN — IOPAMIDOL 75 ML: 612 INJECTION, SOLUTION INTRAVENOUS at 11:19

## 2023-08-25 RX ADMIN — PIPERACILLIN SODIUM AND TAZOBACTAM SODIUM 3.38 G: 3; .375 INJECTION, SOLUTION INTRAVENOUS at 16:09

## 2023-08-25 RX ADMIN — SODIUM CHLORIDE 100 ML/HR: 9 INJECTION, SOLUTION INTRAVENOUS at 15:16

## 2023-08-25 RX ADMIN — Medication 10 ML: at 10:02

## 2023-08-25 RX ADMIN — PIPERACILLIN SODIUM AND TAZOBACTAM SODIUM 3.38 G: 3; .375 INJECTION, SOLUTION INTRAVENOUS at 00:44

## 2023-08-25 RX ADMIN — PIPERACILLIN SODIUM AND TAZOBACTAM SODIUM 3.38 G: 3; .375 INJECTION, SOLUTION INTRAVENOUS at 09:45

## 2023-08-25 NOTE — CONSULTS
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 History is obtained from the patient and review of the old medical records which I summarize/synthesize as follows: She presented to the ER on 8/24/23 with cough, fever, and chest pain. She reports a feeling like a \"balloon\" in her throat and chest. No alleviating factors.     Back in April 2023 she was found to have a paraesophageal mass for which she underwent neck exploration likely related to a contained perforation. She was diagnosed with mediastinitis. Our group saw her during that admission and treated her with Zosyn in-house followed by Augmentin for 5 days at discharge. There were no cultures to guide therapy.    Back to this admission, she says her symptoms began about 4 days ago. She had been in [place] visiting her mother who has lung cancer. She felt ill the last day of her trip there. She has a son at home who has been sick with what sounds like an upper respiratory infection. She says her symptoms this admission feel quite similar to last time.     Labs were notable for a WBC 16 and procal 0.08. PCR negative for COVID-19. CT showed Persistent approximately 3.1 x 2.6 cm complex collection/mass containing air in the superior right paraesophageal region. There was also mention of new BLL airspace consolidation possibly aspiration related. Esophagram was negative for evidence of leak. CT surgery and pulmonary following. ID also asked to weigh in. She has been given ceftriaxone, azithromycin, doxycycline, and Zosyn so far. CT of the neck is pending.     She is also report some loose stools.     Past Medical History:   Diagnosis Date    Asthma     Pneumonia " 8/24/2023       Past Surgical History:   Procedure Laterality Date    ESOPHAGUS SURGERY N/A 04/11/2023    Procedure: EXPLORATION OF NECK, EXCISION OF NECK MASS, EGD, AND FLEXIBLE BRONCHOSCOPY;  Surgeon: Hemanth La MD;  Location: [facility];  Service: Thoracic;  Laterality: N/A;       Social History:  Moved to [place] from [place] about 6 years ago ([place]); moved to [place] end of 2022  Lives w/ son and     Antibiotic allergies and intolerances:    Vancomycin - infusion related rash in April 2023    Medications:    Current Facility-Administered Medications:     albuterol (PROVENTIL) nebulizer solution 0.083% 2.5 mg/3mL, 2.5 mg, Nebulization, Q6H PRN, Juan Estrada MD    diazePAM (VALIUM) injection 2.5 mg, 2.5 mg, Intravenous, Q6H PRN, Beverly Araya APRN    doxycycline (VIBRAMYCIN) 100 mg in sodium chloride 0.9 % 100 mL IVPB-VTB, 100 mg, Intravenous, Q12H, Juan Estrada MD    morphine injection 2 mg, 2 mg, Intravenous, Q4H PRN, Hemanth La MD, 2 mg at 08/24/23 2233    ondansetron (ZOFRAN) injection 4 mg, 4 mg, Intravenous, Q6H PRN, Juan Estrada MD, 4 mg at 08/24/23 2234    piperacillin-tazobactam (ZOSYN) 3.375 g in iso-osmotic dextrose 50 ml (premix), 3.375 g, Intravenous, Q8H, Mora Friedman MD, 3.375 g at 08/25/23 0044    sodium chloride 0.9 % flush 10 mL, 10 mL, Intravenous, Q12H, Juan Estrada MD    sodium chloride 0.9 % flush 10 mL, 10 mL, Intravenous, PRN, Juan Estrada MD    sodium chloride 0.9 % infusion 40 mL, 40 mL, Intravenous, PRNSean Abhishek, MD    sodium chloride 0.9 % infusion, 100 mL/hr, Intravenous, Continuous, Juan Estrada MD, Last Rate: 100 mL/hr at 08/25/23 0005, 100 mL/hr at 08/25/23 0005      Objective   Vital Signs   Temp:  [98.4 °F (36.9 °C)-101.4 °F (38.6 °C)] 99.7 °F (37.6 °C)  Heart Rate:  [] 94  Resp:  [18] 18  BP: ()/(47-77) 87/47    Physical Exam:   General: awake, alert, NAD, very nice  Eyes: no scleral icterus  ENT: no  "thrush  Neck: healed incision  Cardiovascular: NR  Respiratory: BLL rales w/ severe coughing with deep inspiration  GI: Abdomen is soft, not tender, + bowel sounds in all four quadrants  :  no Keating catheter  Skin: No rashes  Neurological: Alert and oriented x 3  Psychiatric: Normal mood and affect   Vasc: PIV w/o erythema    Labs:     Lab Results   Component Value Date    WBC 17.68 (H) 08/25/2023    HGB 11.3 (L) 08/25/2023    HCT 33.7 (L) 08/25/2023    MCV 89.9 08/25/2023     08/25/2023       Lab Results   Component Value Date    GLUCOSE 108 (H) 08/24/2023    BUN 5 (L) 08/24/2023    CREATININE 0.65 08/24/2023    BCR 7.7 08/24/2023    CO2 24.4 08/24/2023    CALCIUM 9.1 08/24/2023    ALBUMIN 4.7 04/11/2023    AST 12 04/11/2023    ALT 13 04/11/2023     No results found for: CRP    HCG negative  Procal 0.08    Microbiology:  8/24 COVID: negative  8/24 BCx: pending      Radiology:  Esophogram: \"No evidence of esophageal perforation. \"    CTA Chest:   \"1. Persistent approximately 3.1 x 2.6 cm complex collection/mass   containing air in the superior right paraesophageal region. As discussed   previously, an infectious or inflammatory etiology is suspected, but the   etiology is uncertain and malignancy cannot be entirely excluded. There   is mass effect on the trachea and esophagus to the left. No definite   esophageal leak was seen on the previous esophagram. Thoracic surgery   consultation is recommended.   2. The new bilateral lower lobe airspace opacities as described likely   represent bronchiolitis/developing pneumonia and aspiration is a   consideration.   3. There is no evidence for pulmonary thromboemboli. \"    CT Neck: pending    ASSESSMENT/PLAN:  Right paraesophageal fluid collection with mass effect  Bilateral lower lobe infiltrates  Fever in adult  Diarrhea    Symptoms could certainly be similar to last admission and related to the collection seen on CT chest. CT neck is pending, and I will follow-up " that result. I will continue her on Zosyn and doxycycline for now while awaiting more info.     Also, her young son has respiratory virus so will check an RPP (without COVID-19 since it was already done and is negative) as she could have a viral URI causing her current fever and lower lobe findings on CT chest.     If loose stools persist, will check stool studies given recent travel.     ID will follow. Case and its management will be discussed w/ thoracic surgery team.     ADDENDUM: RPP positive for parainfluenza. Precautions per infection control. I will stop her doxycycline. I'll continue Zosyn for now while awaiting CT scan and thoracic surgery update.

## 2023-08-25 NOTE — PROGRESS NOTES
"DAILY PROGRESS NOTE  [facility]    Patient Identification:  Name: Wilda Mayo  Age: 37 y.o.  Sex: female  :  1985  MRN: [id number]         Primary Care Physician: Provider, No Known    Subjective:  Interval History: She is hungry and wants some food.    Objective:    Scheduled Meds:piperacillin-tazobactam, 3.375 g, Intravenous, Q8H  sodium chloride, 10 mL, Intravenous, Q12H      Continuous Infusions:sodium chloride, 100 mL/hr, Last Rate: 100 mL/hr (23 1323)        Vital signs in last 24 hours:  Temp:  [98.6 °F (37 °C)-100 °F (37.8 °C)] 100 °F (37.8 °C)  Heart Rate:  [] 89  Resp:  [18] 18  BP: ()/(47-70) 96/59    Intake/Output:    Intake/Output Summary (Last 24 hours) at 2023 1450  Last data filed at 2023 0500  Gross per 24 hour   Intake 100 ml   Output --   Net 100 ml       Exam:  BP 96/59   Pulse 89   Temp 100 °F (37.8 °C)   Resp 18   Ht 175.3 cm (69\")   Wt 65.3 kg (143 lb 15.4 oz)   LMP 2023 (Approximate)   SpO2 96%   BMI 21.26 kg/m²     General Appearance:    Alert, cooperative, no distress   Head:    Normocephalic, without obvious abnormality, atraumatic   Eyes:       Throat:   Lips, tongue, gums normal   Neck:   Supple, symmetrical, trachea midline, no JVD   Lungs:     Clear to auscultation bilaterally, respirations unlabored   Chest Wall:    No tenderness or deformity    Heart:    Regular rate and rhythm, S1 and S2 normal, no murmur,no  Rub or gallop   Abdomen:     Soft, nontender, bowel sounds active, no masses, no organomegaly    Extremities:   Extremities normal, atraumatic, no cyanosis or edema   Pulses:      Skin:   Skin is warm and dry,  no rashes or palpable lesions   Neurologic:   no focal deficits noted      Lab Results (last 72 hours)       Procedure Component Value Units Date/Time    Respiratory Panel PCR w/COVID-19(SARS-CoV-2) KIAN/DARIELA/HECTOR/PAD/COR/MAD/EMILE In-House, NP Swab in UTM/VTM, 3-4 HR TAT - Swab, Nasopharynx " [[id number]]  (Abnormal) Collected: 08/25/23 0949    Specimen: Swab from Nasopharynx Updated: 08/25/23 1147     ADENOVIRUS, PCR Not Detected     Coronavirus 229E Not Detected     Coronavirus HKU1 Not Detected     Coronavirus NL63 Not Detected     Coronavirus OC43 Not Detected     COVID19 Not Detected     Human Metapneumovirus Not Detected     Human Rhinovirus/Enterovirus Not Detected     Influenza A PCR Not Detected     Influenza B PCR Not Detected     Parainfluenza Virus 1 Not Detected     Parainfluenza Virus 2 Not Detected     Parainfluenza Virus 3 Not Detected     Parainfluenza Virus 4 Detected     RSV, PCR Not Detected     Bordetella pertussis pcr Not Detected     Bordetella parapertussis PCR Not Detected     Chlamydophila pneumoniae PCR Not Detected     Mycoplasma pneumo by PCR Not Detected    Narrative:      In the setting of a positive respiratory panel with a viral infection PLUS a negative procalcitonin without other underlying concern for bacterial infection, consider observing off antibiotics or discontinuation of antibiotics and continue supportive care. If the respiratory panel is positive for atypical bacterial infection (Bordetella pertussis, Chlamydophila pneumoniae, or Mycoplasma pneumoniae), consider antibiotic de-escalation to target atypical bacterial infection.    Comprehensive Metabolic Panel [[id number]]  (Abnormal) Collected: 08/25/23 0724    Specimen: Blood Updated: 08/25/23 0823     Glucose 84 mg/dL      BUN 8 mg/dL      Creatinine 0.64 mg/dL      Sodium 142 mmol/L      Potassium 3.6 mmol/L      Chloride 108 mmol/L      CO2 20.3 mmol/L      Calcium 8.4 mg/dL      Total Protein 5.9 g/dL      Albumin 3.2 g/dL      ALT (SGPT) 28 U/L      AST (SGOT) 12 U/L      Alkaline Phosphatase 78 U/L      Total Bilirubin 0.4 mg/dL      Globulin 2.7 gm/dL      A/G Ratio 1.2 g/dL      BUN/Creatinine Ratio 12.5     Anion Gap 13.7 mmol/L      eGFR 118.4 mL/min/1.73     Narrative:      GFR Normal >60  Chronic  Kidney Disease <60  Kidney Failure <15      CBC Auto Differential [[id number]]  (Abnormal) Collected: 08/25/23 0724    Specimen: Blood Updated: 08/25/23 0803     WBC 17.68 10*3/mm3      RBC 3.75 10*6/mm3      Hemoglobin 11.3 g/dL      Hematocrit 33.7 %      MCV 89.9 fL      MCH 30.1 pg      MCHC 33.5 g/dL      RDW 12.3 %      RDW-SD 40.0 fl      MPV 9.5 fL      Platelets 346 10*3/mm3      Neutrophil % 81.1 %      Lymphocyte % 10.0 %      Monocyte % 6.4 %      Eosinophil % 1.6 %      Basophil % 0.3 %      Immature Grans % 0.6 %      Neutrophils, Absolute 14.32 10*3/mm3      Lymphocytes, Absolute 1.76 10*3/mm3      Monocytes, Absolute 1.14 10*3/mm3      Eosinophils, Absolute 0.29 10*3/mm3      Basophils, Absolute 0.06 10*3/mm3      Immature Grans, Absolute 0.11 10*3/mm3      nRBC 0.0 /100 WBC     Lactic Acid, Plasma [[id number]]  (Normal) Collected: 08/24/23 1452    Specimen: Blood from Arm, Left Updated: 08/24/23 1532     Lactate 1.0 mmol/L     Blood Culture - Blood, Arm, Left [[id number]] Collected: 08/24/23 1452    Specimen: Blood from Arm, Left Updated: 08/24/23 1515    Blood Culture - Blood, Arm, Left [[id number]] Collected: 08/24/23 1456    Specimen: Blood from Arm, Left Updated: 08/24/23 1515    High Sensitivity Troponin T 2Hr [[id number]] Collected: 08/24/23 1229    Specimen: Blood Updated: 08/24/23 1301     HS Troponin T <6 ng/L      Troponin T Delta --     Comment: Unable to calculate.       Narrative:      High Sensitive Troponin T Reference Range:  <10.0 ng/L- Negative Female for AMI  <15.0 ng/L- Negative Male for AMI  >=10 - Abnormal Female indicating possible myocardial injury.  >=15 - Abnormal Male indicating possible myocardial injury.   Clinicians would have to utilize clinical acumen, EKG, Troponin, and serial changes to determine if it is an Acute Myocardial Infarction or myocardial injury due to an underlying chronic condition.         COVID-19,[facility] KIAN IN-HOUSE CEPHEID/CHINYERE NP SWAB IN TRANSPORT MEDIA  "8-12 HR TAT - Swab, Nasopharynx [[id number]]  (Normal) Collected: 08/24/23 1005    Specimen: Swab from Nasopharynx Updated: 08/24/23 1055     COVID19 Not Detected    Narrative:      Fact sheet for providers: https://www.fda.gov/media/197099/download     Fact sheet for patients: https://www.fda.gov/media/298902/download    High Sensitivity Troponin T [[id number]]  (Normal) Collected: 08/24/23 1009    Specimen: Blood Updated: 08/24/23 1050     HS Troponin T <6 ng/L     Narrative:      High Sensitive Troponin T Reference Range:  <10.0 ng/L- Negative Female for AMI  <15.0 ng/L- Negative Male for AMI  >=10 - Abnormal Female indicating possible myocardial injury.  >=15 - Abnormal Male indicating possible myocardial injury.   Clinicians would have to utilize clinical acumen, EKG, Troponin, and serial changes to determine if it is an Acute Myocardial Infarction or myocardial injury due to an underlying chronic condition.         Procalcitonin [[id number]]  (Normal) Collected: 08/24/23 1009    Specimen: Blood Updated: 08/24/23 1050     Procalcitonin 0.08 ng/mL     Narrative:      As a Marker for Sepsis (Non-Neonates):    1. <0.5 ng/mL represents a low risk of severe sepsis and/or septic shock.  2. >2 ng/mL represents a high risk of severe sepsis and/or septic shock.    As a Marker for Lower Respiratory Tract Infections that require antibiotic therapy:    PCT on Admission    Antibiotic Therapy       6-12 Hrs later    >0.5                Strongly Recommended  >0.25 - <0.5        Recommended   0.1 - 0.25          Discouraged              Remeasure/reassess PCT  <0.1                Strongly Discouraged     Remeasure/reassess PCT    As 28 day mortality risk marker: \"Change in Procalcitonin Result\" (>80% or <=80%) if Day 0 (or Day 1) and Day 4 values are available. Refer to http://[email or web address]    Change in PCT <=80%  A decrease of PCT levels below or equal to 80% defines a positive change in PCT test result " representing a higher risk for 28-day all-cause mortality of patients diagnosed with severe sepsis for septic shock.    Change in PCT >80%  A decrease of PCT levels of more than 80% defines a negative change in PCT result representing a lower risk for 28-day all-cause mortality of patients diagnosed with severe sepsis or septic shock.       Basic Metabolic Panel [[id number]]  (Abnormal) Collected: 08/24/23 1009    Specimen: Blood Updated: 08/24/23 1043     Glucose 108 mg/dL      BUN 5 mg/dL      Creatinine 0.65 mg/dL      Sodium 138 mmol/L      Potassium 3.8 mmol/L      Chloride 105 mmol/L      CO2 24.4 mmol/L      Calcium 9.1 mg/dL      BUN/Creatinine Ratio 7.7     Anion Gap 8.6 mmol/L      eGFR 117.9 mL/min/1.73     Narrative:      GFR Normal >60  Chronic Kidney Disease <60  Kidney Failure <15      hCG, Serum, Qualitative [[id number]]  (Normal) Collected: 08/24/23 1009    Specimen: Blood Updated: 08/24/23 1038     HCG Qualitative Negative    CBC & Differential [[id number]]  (Abnormal) Collected: 08/24/23 1009    Specimen: Blood Updated: 08/24/23 1023    Narrative:      The following orders were created for panel order CBC & Differential.  Procedure                               Abnormality         Status                     ---------                               -----------         ------                     CBC Auto Differential[[id number]]        Abnormal            Final result                 Please view results for these tests on the individual orders.    CBC Auto Differential [[id number]]  (Abnormal) Collected: 08/24/23 1009    Specimen: Blood Updated: 08/24/23 1023     WBC 16.62 10*3/mm3      RBC 4.33 10*6/mm3      Hemoglobin 12.8 g/dL      Hematocrit 39.2 %      MCV 90.5 fL      MCH 29.6 pg      MCHC 32.7 g/dL      RDW 12.7 %      RDW-SD 41.9 fl      MPV 9.6 fL      Platelets 347 10*3/mm3      Neutrophil % 76.1 %      Lymphocyte % 12.0 %      Monocyte % 6.5 %      Eosinophil % 4.6 %      Basophil % 0.4 %       Immature Grans % 0.4 %      Neutrophils, Absolute 12.65 10*3/mm3      Lymphocytes, Absolute 2.00 10*3/mm3      Monocytes, Absolute 1.08 10*3/mm3      Eosinophils, Absolute 0.76 10*3/mm3      Basophils, Absolute 0.06 10*3/mm3      Immature Grans, Absolute 0.07 10*3/mm3      nRBC 0.0 /100 WBC           Data Review:  Results from last 7 days   Lab Units 08/25/23  0724 08/24/23  1009   SODIUM mmol/L 142 138   POTASSIUM mmol/L 3.6 3.8   CHLORIDE mmol/L 108* 105   CO2 mmol/L 20.3* 24.4   BUN mg/dL 8 5*   CREATININE mg/dL 0.64 0.65   GLUCOSE mg/dL 84 108*   CALCIUM mg/dL 8.4* 9.1     Results from last 7 days   Lab Units 08/25/23  0724 08/24/23  1009   WBC 10*3/mm3 17.68* 16.62*   HEMOGLOBIN g/dL 11.3* 12.8   HEMATOCRIT % 33.7* 39.2   PLATELETS 10*3/mm3 346 347             Lab Results   Lab Value Date/Time    TROPONINT <6 08/24/2023 1229    TROPONINT <6 08/24/2023 1009    TROPONINT <6 04/11/2023 1322    TROPONINT <6 04/11/2023 1145         Results from last 7 days   Lab Units 08/25/23  0724   ALK PHOS U/L 78   BILIRUBIN mg/dL 0.4   ALT (SGPT) U/L 28   AST (SGOT) U/L 12             No results found for: POCGLU        Past Medical History:   Diagnosis Date    Asthma     Pneumonia 8/24/2023       Assessment:  Active Hospital Problems    Diagnosis  POA    **Pneumonia [J18.9]  Yes    Infection due to parainfluenza virus 4 [B34.8]  Yes    Neck pain [M54.2]  Yes    Asthma [J45.909]  Yes    Paratracheal gas collection [R93.89]  Yes      Resolved Hospital Problems   No resolved problems to display.       Plan:  Continue with antibiotics per infectious disease.  Follow-up lab and cultures.  Await plans from thoracic surgery.  Pulmonary consult noted.    Aung Barber MD  8/25/2023  14:50 EDT

## 2023-08-25 NOTE — PROGRESS NOTES
"    Chief Complaint: Paraesophageal mass with concern for mediastinitis      Subjective:  Symptoms:  Stable.  She reports chest pain.    Diet:  NPO.  No nausea or vomiting.    Activity level: Returning to normal.    Pain:  She complains of pain that is mild.      Vital Signs:  Temp:  [98.6 °F (37 °C)-100 °F (37.8 °C)] 100 °F (37.8 °C)  Heart Rate:  [] 89  Resp:  [18] 18  BP: ()/(47-70) 96/59    Intake & Output (last day)         08/24 0701  08/25 0700 08/25 0701 08/26 0700    P.O. 0     IV Piggyback 1100     Total Intake(mL/kg) 1100 (16.8)     Net +1100           Urine Unmeasured Occurrence 1 x     Stool Unmeasured Occurrence 1 x             Objective:  General Appearance:  Comfortable, ill-appearing and in no acute distress.    Vital signs: (most recent): Blood pressure 96/59, pulse 89, temperature 100 °F (37.8 °C), resp. rate 18, height 175.3 cm (69\"), weight 65.3 kg (143 lb 15.4 oz), last menstrual period 08/17/2023, SpO2 96 %.  (Hypotensive, low-grade fever).    Output: Producing urine.    Lungs:  Normal effort and normal respiratory rate.  There are wheezes and rhonchi.    Heart: Normal rate.  Regular rhythm.    Abdomen: There is no abdominal tenderness.     Neurological: Patient is alert and oriented to person, place and time.    Skin:  Warm and dry.                Results Review:     I reviewed the patient's new clinical results.  I reviewed the patient's new imaging results and agree with the interpretation.  I reviewed the patient's other test results and agree with the interpretation  Discussed with patient, Dr. La.     Imaging Results (Last 24 Hours)       Procedure Component Value Units Date/Time    CT Soft Tissue Neck With Contrast [[id number]] Collected: 08/25/23 1333     Updated: 08/25/23 1333    Narrative:      CT OF THE NECK WITH CONTRAST 08/25/2023     HISTORY: Follow-up esophageal mass.     Spiral images were obtained from the orbits to the lung apices after  intravenous contrast. The " previous CT of the chest from yesterday is  compared.     Again seen is soft tissue mass in the right paraesophageal region at the  level of the cervicothoracic junction immediately anterior to the spine.  It measures approximately 2.9 cm by approximately 2.7 cm in transverse  dimensions by approximately 3.3 cm in craniocaudal dimension when it  compressesed the posterior aspect of the right thyroid lobe. This mass  contains a small amount of air. There is mass effect on the rightward  aspect of the esophagus. No esophageal perforation was seen on  yesterday's esophagram.     The airway is patent.     No pathologically enlarged lymph nodes are seen in the neck. A few  shotty nodes are seen. Salivary glands appear within normal limits.  Normal vascular enhancement is seen.     There is moderately severe sinusitis involving the bilateral ethmoid,  bilateral maxillary and sphenoid sinuses. Frontal sinus is not included  in the field-of-view.       Impression:      1. Moderate size right periesophageal soft tissue mass at the  cervicothoracic junction as described. This mass contains a small amount  of air and is unchanged from yesterday's study. This could be an  inflammatory or neoplastic mass.  2. No esophageal perforation was seen on yesterday's esophagram.  3. No pathologic lymphadenopathy or other neck masses are seen.  4. Relatively extensive sinusitis.     Radiation dose reduction techniques were utilized, including automated  exposure control and exposure modulation based on body size.          CT Angiogram Chest Pulmonary Embolism [[id number]] Collected: 08/24/23 1209     Updated: 08/25/23 0851    Narrative:      CT ANGIOGRAM OF THE CHEST. MULTIPLE CORONAL, SAGITTAL, AND 3-D  RECONSTRUCTIONS.     HISTORY: 37-year-old female with chest pain and cough.     TECHNIQUE: Radiation dose reduction techniques were utilized, including  automated exposure control and exposure modulation based on body size.   CT angiogram  of the chest was performed following the administration of  IV contrast. Multiple coronal, sagittal, and 3-D reconstruction images  were obtained. Compared with chest CTA and neck CT 04/11/2023. Also  correlated with esophagram 04/11/2023.     FINDINGS: There is no evidence for pulmonary thromboemboli. Again seen  is a complex air-containing mass/collection in the superior right  paraesophageal region at the thoracic inlet and superior mediastinum.  The margins were better seen on the neck CT and appear without  significant change since the chest CTA performed the same day. There is  mass effect on the on the trachea and esophagus to the left. The  measurements are approximately 3.1 x 2.6 cm which is grossly unchanged.  The esophageal wall is indistinct and there is indistinct increased  density throughout the mediastinum and sherwin without discrete  pathologically enlarged nodes. There are no pleural or pericardial  effusions. There is mild asymmetric bronchial thickening at the right  lower lobe which was not definitively present previously and there are  new ill-defined mixed-density peribronchial airspace opacities in the  perihilar region of the left lower lobe and at both lung bases. There is  also ill-defined ground-glass opacification at the right lower lobe.       Impression:      Outside facility neck CT 5/5/2023 has become available for  comparison. This collection was predominantly air-filled on the outside  facility neck CT. A RECURRENT INFECTED RIGHT PARATRACHEAL CYST IS  SUSPECTED TO BE THE ETIOLOGY FOR THESE FINDINGS. THORACIC SURGERY  CONSULTATION IS RECOMMENDED.        1. Persistent or recurrent approximately 3.1 x 2.6 cm complex  collection/mass containing air in the superior right paraesophageal  region. As discussed previously, an infectious or inflammatory etiology  is suspected, but malignancy cannot be entirely excluded. There is mass  effect on the trachea and esophagus to the left. No  definite esophageal  leak was seen on the previous esophagram. Thoracic surgery consultation  is recommended.  2. The new bilateral lower lobe airspace opacities as described likely  represent bronchiolitis/developing pneumonia and aspiration is a  consideration.  3. There is no evidence for pulmonary thromboemboli.           This report was finalized on 8/25/2023 8:47 AM by Dr. Winifred Murcia M.D.               Lab Results:     Lab Results (last 24 hours)       Procedure Component Value Units Date/Time    Respiratory Panel PCR w/COVID-19(SARS-CoV-2) KIAN/DARIELA/HECTOR/PAD/COR/MAD/EMILE In-House, NP Swab in UTM/VTM, 3-4 HR TAT - Swab, Nasopharynx [[id number]]  (Abnormal) Collected: 08/25/23 0949    Specimen: Swab from Nasopharynx Updated: 08/25/23 1147     ADENOVIRUS, PCR Not Detected     Coronavirus 229E Not Detected     Coronavirus HKU1 Not Detected     Coronavirus NL63 Not Detected     Coronavirus OC43 Not Detected     COVID19 Not Detected     Human Metapneumovirus Not Detected     Human Rhinovirus/Enterovirus Not Detected     Influenza A PCR Not Detected     Influenza B PCR Not Detected     Parainfluenza Virus 1 Not Detected     Parainfluenza Virus 2 Not Detected     Parainfluenza Virus 3 Not Detected     Parainfluenza Virus 4 Detected     RSV, PCR Not Detected     Bordetella pertussis pcr Not Detected     Bordetella parapertussis PCR Not Detected     Chlamydophila pneumoniae PCR Not Detected     Mycoplasma pneumo by PCR Not Detected    Narrative:      In the setting of a positive respiratory panel with a viral infection PLUS a negative procalcitonin without other underlying concern for bacterial infection, consider observing off antibiotics or discontinuation of antibiotics and continue supportive care. If the respiratory panel is positive for atypical bacterial infection (Bordetella pertussis, Chlamydophila pneumoniae, or Mycoplasma pneumoniae), consider antibiotic de-escalation to target atypical bacterial infection.     Comprehensive Metabolic Panel [[id number]]  (Abnormal) Collected: 08/25/23 0724    Specimen: Blood Updated: 08/25/23 0823     Glucose 84 mg/dL      BUN 8 mg/dL      Creatinine 0.64 mg/dL      Sodium 142 mmol/L      Potassium 3.6 mmol/L      Chloride 108 mmol/L      CO2 20.3 mmol/L      Calcium 8.4 mg/dL      Total Protein 5.9 g/dL      Albumin 3.2 g/dL      ALT (SGPT) 28 U/L      AST (SGOT) 12 U/L      Alkaline Phosphatase 78 U/L      Total Bilirubin 0.4 mg/dL      Globulin 2.7 gm/dL      A/G Ratio 1.2 g/dL      BUN/Creatinine Ratio 12.5     Anion Gap 13.7 mmol/L      eGFR 118.4 mL/min/1.73     Narrative:      GFR Normal >60  Chronic Kidney Disease <60  Kidney Failure <15      CBC Auto Differential [[id number]]  (Abnormal) Collected: 08/25/23 0724    Specimen: Blood Updated: 08/25/23 0803     WBC 17.68 10*3/mm3      RBC 3.75 10*6/mm3      Hemoglobin 11.3 g/dL      Hematocrit 33.7 %      MCV 89.9 fL      MCH 30.1 pg      MCHC 33.5 g/dL      RDW 12.3 %      RDW-SD 40.0 fl      MPV 9.5 fL      Platelets 346 10*3/mm3      Neutrophil % 81.1 %      Lymphocyte % 10.0 %      Monocyte % 6.4 %      Eosinophil % 1.6 %      Basophil % 0.3 %      Immature Grans % 0.6 %      Neutrophils, Absolute 14.32 10*3/mm3      Lymphocytes, Absolute 1.76 10*3/mm3      Monocytes, Absolute 1.14 10*3/mm3      Eosinophils, Absolute 0.29 10*3/mm3      Basophils, Absolute 0.06 10*3/mm3      Immature Grans, Absolute 0.11 10*3/mm3      nRBC 0.0 /100 WBC     Lactic Acid, Plasma [[id number]]  (Normal) Collected: 08/24/23 1452    Specimen: Blood from Arm, Left Updated: 08/24/23 1532     Lactate 1.0 mmol/L              Assessment & Plan       Pneumonia    Paratracheal gas collection    Asthma    Neck pain    Infection due to parainfluenza virus 4       Assessment & Plan    Respiratory viral panel positive for parainfluenza.  CT of the neck was performed today and demonstrates moderate size right paraesophageal soft tissue mass of the cervical thoracic  junction.  This measures approximately 2.9 cm x 2.7 cm x 3.3 cm.  There is a small amount of air.  There is mass effect on the right lower aspect of the esophagus.  Yesterday's esophagram demonstrated no evidence of leak or extravasation.  Paraesophageal mass: Patient underwent exploratory procedure in April by Dr. La for a similar issue.  She will likely need re-exploration of the neck which can be performed early next week.  White count up today to 17.68 (from 16.6 yesterday).  Discussed with Dr. La in detail.  Due to concern for mediastinitis going to keep her n.p.o. for 1 more day. Continue IV fluids for now.    ANNETTE Waller  [facility]  08/25/23  15:16 EDT    Greater than 35 minutes was spent reviewing the patient's chart, radiographic imaging, labs, provider notes, assessing the patient and developing a plan of care.  This was discussed with the patient and RN.

## 2023-08-25 NOTE — CASE MANAGEMENT/SOCIAL WORK
Discharge Planning Assessment  [facility]     Patient Name: Wilda Mayo  MRN: [id number]  Today's Date: 8/25/2023    Admit Date: 8/24/2023    Plan: Return home with family   Discharge Needs Assessment       Row Name 08/25/23 1334       Living Environment    People in Home child(shawn), dependent;spouse    Name(s) of People in Home  Noe and 2 yr old child    Current Living Arrangements home    Potentially Unsafe Housing Conditions none    Primary Care Provided by self    Provides Primary Care For child(shawn)    Caregiving Concerns 4 yr old    Family Caregiver if Needed spouse    Family Caregiver Names  Noe [phone]    Quality of Family Relationships involved    Able to Return to Prior Arrangements yes       Resource/Environmental Concerns    Resource/Environmental Concerns none    Transportation Concerns none       Food Insecurity    Within the past 12 months, you worried that your food would run out before you got the money to buy more. Never true    Within the past 12 months, the food you bought just didn't last and you didn't have money to get more. Never true       Transition Planning    Patient/Family Anticipates Transition to home with family    Patient/Family Anticipated Services at Transition none    Transportation Anticipated family or friend will provide       Discharge Needs Assessment    Readmission Within the Last 30 Days no previous admission in last 30 days    Equipment Currently Used at Home nebulizer    Concerns to be Addressed denies needs/concerns at this time    Anticipated Changes Related to Illness none    Equipment Needed After Discharge none                   Discharge Plan       Row Name 08/25/23 5089       Plan    Plan Return home with family    Patient/Family in Agreement with Plan yes    Plan Comments Spoke with patient at bedside.  She lives with  Noe [phone] and 2 yr old child.  She is IADL, has a nebulizer, has never used HH.  She does  not have a PCP - given list of [facility] physicians with contact information.  Pharmacy is [facility] on [address].  Patient states  will help her if needed.  She plans to return home at [place].  CCP will follow as needed.  Neil POTTER                  Continued Care and Services - Admitted Since 8/24/2023    Coordination has not been started for this encounter.       Expected Discharge Date and Time       Expected Discharge Date Expected Discharge Time    Aug 26, 2023            Demographic Summary       Row Name 08/25/23 6716       General Information    Admission Type observation    Arrived From home    Referral Source admission list    Reason for Consult discharge planning    Preferred Language Stateless    General Information Comments Is able to understand and speak English                   Functional Status       Row Name 08/25/23 1339       Functional Status    Usual Activity Tolerance good    Current Activity Tolerance good       Functional Status, IADL    Medications independent    Meal Preparation independent    Housekeeping independent    Laundry independent    Shopping independent       Mental Status    General Appearance WDL WDL                          Becky S. [place], RN

## 2023-08-25 NOTE — PROGRESS NOTES
"      [facility]         Dr Velazco Sayied   LOS: 0 days   Patient Care Team:  Provider, No Known as PCP - General    Chief Complaint: Pneumonia with paraesophageal fluid collection questionable abscess other issues as listed below    Interval History: Remains on room air complains of pain around her throat right side of her chest and upper back.    REVIEW OF SYSTEMS:   CARDIOVASCULAR: Chest pain as above  RESPIRATORY: No shortness of breath, cough or sputum.   GASTROINTESTINAL: No anorexia, nausea, vomiting or diarrhea. No abdominal pain or blood.   HEMATOLOGIC: No bleeding or bruising.     Ventilator/Non-Invasive Ventilation Settings (From admission, onward)      None              Vital Signs  Temp:  [98.6 °F (37 °C)-101.4 °F (38.6 °C)] 99.7 °F (37.6 °C)  Heart Rate:  [] 94  Resp:  [18] 18  BP: ()/(47-77) 87/47    Intake/Output Summary (Last 24 hours) at 8/25/2023 1202  Last data filed at 8/25/2023 0500  Gross per 24 hour   Intake 1100 ml   Output --   Net 1100 ml     Flowsheet Rows      Flowsheet Row First Filed Value   Admission Height 175.3 cm (69\") Documented at 08/24/2023 0927   Admission Weight 68.5 kg (151 lb) Documented at 08/24/2023 0927            Physical Exam:  Patient is examined using the personal protective equipment as per guidelines from infection control for this particular patient as enacted.  Hand hygiene was performed before and after patient interaction.   General Appearance:    Alert, cooperative, in no acute distress.  Following simple commands  ENT Mallampati between 3 and 4 no nasal congestion  Neck midline trachea, no thyromegaly   Lungs:      diminished breath sounds mostly clear    Heart:    Regular rhythm and normal rate, normal S1 and S2, no            murmur, no gallop, no rub, no click   Chest Wall:    No abnormalities observed   Abdomen:     Normal bowel sounds, no masses, no organomegaly, soft        nontender, nondistended, no guarding, no rebound       "          tenderness   Extremities:   Moves all extremities well, no edema, no cyanosis, no             redness  CNS no focal neurological deficits normal sensory exam  Skin no rashes no nodules  Musculoskeletal no cyanosis no clubbing normal range of motion     Results Review:        Results from last 7 days   Lab Units 08/25/23  0724 08/24/23  1009   SODIUM mmol/L 142 138   POTASSIUM mmol/L 3.6 3.8   CHLORIDE mmol/L 108* 105   CO2 mmol/L 20.3* 24.4   BUN mg/dL 8 5*   CREATININE mg/dL 0.64 0.65   GLUCOSE mg/dL 84 108*   CALCIUM mg/dL 8.4* 9.1     Results from last 7 days   Lab Units 08/24/23  1229 08/24/23  1009   HSTROP T ng/L <6 <6     Results from last 7 days   Lab Units 08/25/23  0724 08/24/23  1009   WBC 10*3/mm3 17.68* 16.62*   HEMOGLOBIN g/dL 11.3* 12.8   HEMATOCRIT % 33.7* 39.2   PLATELETS 10*3/mm3 346 347                           I reviewed the patient's new clinical results.  I personally viewed and interpreted the patient's chest x-ray.        Medication Review:   doxycycline, 100 mg, Intravenous, Q12H  piperacillin-tazobactam, 3.375 g, Intravenous, Q8H  sodium chloride, 10 mL, Intravenous, Q12H        sodium chloride, 100 mL/hr, Last Rate: 100 mL/hr (08/25/23 0005)        ASSESSMENT:   Pneumonia  Right paraesophageal collection/questionable abscess  Leukocytosis  History of mediastinitis  History of asthma       PLAN:  Continue current antibiotics  Minimal pneumonia noted on CT chest current antibiotics per infectious diseases.  CT neck pending  Thoracic surgery input noted.  She remains on room air  Monitor clinical course closely.      Mora Friedman MD  08/25/23  12:02 EDT

## 2023-08-25 NOTE — PLAN OF CARE
Goal Outcome Evaluation:  Plan of Care Reviewed With: patient        Progress: no change  Outcome Evaluation: Pt remains NPO. VSS. IV fluids persist. Up ad-madelaine. CT scan complete. Await treatment per surgery . WCTM.

## 2023-08-26 LAB
ALBUMIN SERPL-MCNC: 3 G/DL (ref 3.5–5.2)
ALBUMIN/GLOB SERPL: 1 G/DL
ALP SERPL-CCNC: 129 U/L (ref 39–117)
ALT SERPL W P-5'-P-CCNC: 26 U/L (ref 1–33)
ANION GAP SERPL CALCULATED.3IONS-SCNC: 10 MMOL/L (ref 5–15)
AST SERPL-CCNC: 19 U/L (ref 1–32)
BILIRUB SERPL-MCNC: 0.5 MG/DL (ref 0–1.2)
BUN SERPL-MCNC: 8 MG/DL (ref 6–20)
BUN/CREAT SERPL: 14.8 (ref 7–25)
CALCIUM SPEC-SCNC: 8.1 MG/DL (ref 8.6–10.5)
CHLORIDE SERPL-SCNC: 106 MMOL/L (ref 98–107)
CO2 SERPL-SCNC: 23 MMOL/L (ref 22–29)
CREAT SERPL-MCNC: 0.54 MG/DL (ref 0.57–1)
DEPRECATED RDW RBC AUTO: 40.1 FL (ref 37–54)
EGFRCR SERPLBLD CKD-EPI 2021: 123.3 ML/MIN/1.73
ERYTHROCYTE [DISTWIDTH] IN BLOOD BY AUTOMATED COUNT: 12.2 % (ref 12.3–15.4)
GLOBULIN UR ELPH-MCNC: 2.9 GM/DL
GLUCOSE SERPL-MCNC: 77 MG/DL (ref 65–99)
HCT VFR BLD AUTO: 30.9 % (ref 34–46.6)
HGB BLD-MCNC: 10.3 G/DL (ref 12–15.9)
HIV 1+2 AB+HIV1 P24 AG SERPL QL IA: NORMAL
MCH RBC QN AUTO: 30 PG (ref 26.6–33)
MCHC RBC AUTO-ENTMCNC: 33.3 G/DL (ref 31.5–35.7)
MCV RBC AUTO: 90.1 FL (ref 79–97)
PLATELET # BLD AUTO: 333 10*3/MM3 (ref 140–450)
PMV BLD AUTO: 9.7 FL (ref 6–12)
POTASSIUM SERPL-SCNC: 3.7 MMOL/L (ref 3.5–5.2)
PROT SERPL-MCNC: 5.9 G/DL (ref 6–8.5)
RBC # BLD AUTO: 3.43 10*6/MM3 (ref 3.77–5.28)
SODIUM SERPL-SCNC: 139 MMOL/L (ref 136–145)
WBC NRBC COR # BLD: 11.05 10*3/MM3 (ref 3.4–10.8)

## 2023-08-26 PROCEDURE — G0378 HOSPITAL OBSERVATION PER HR: HCPCS

## 2023-08-26 PROCEDURE — 99232 SBSQ HOSP IP/OBS MODERATE 35: CPT | Performed by: INTERNAL MEDICINE

## 2023-08-26 PROCEDURE — 94664 DEMO&/EVAL PT USE INHALER: CPT

## 2023-08-26 PROCEDURE — 25010000002 PIPERACILLIN SOD-TAZOBACTAM PER 1 G: Performed by: INTERNAL MEDICINE

## 2023-08-26 PROCEDURE — 80053 COMPREHEN METABOLIC PANEL: CPT | Performed by: INTERNAL MEDICINE

## 2023-08-26 PROCEDURE — G0432 EIA HIV-1/HIV-2 SCREEN: HCPCS | Performed by: INTERNAL MEDICINE

## 2023-08-26 PROCEDURE — 94799 UNLISTED PULMONARY SVC/PX: CPT

## 2023-08-26 PROCEDURE — 85027 COMPLETE CBC AUTOMATED: CPT | Performed by: INTERNAL MEDICINE

## 2023-08-26 PROCEDURE — 99232 SBSQ HOSP IP/OBS MODERATE 35: CPT | Performed by: SURGERY

## 2023-08-26 PROCEDURE — 94761 N-INVAS EAR/PLS OXIMETRY MLT: CPT

## 2023-08-26 RX ADMIN — IPRATROPIUM BROMIDE AND ALBUTEROL SULFATE 3 ML: 2.5; .5 SOLUTION RESPIRATORY (INHALATION) at 14:59

## 2023-08-26 RX ADMIN — IPRATROPIUM BROMIDE AND ALBUTEROL SULFATE 3 ML: 2.5; .5 SOLUTION RESPIRATORY (INHALATION) at 08:18

## 2023-08-26 RX ADMIN — SODIUM CHLORIDE 100 ML/HR: 9 INJECTION, SOLUTION INTRAVENOUS at 04:36

## 2023-08-26 RX ADMIN — PIPERACILLIN SODIUM AND TAZOBACTAM SODIUM 3.38 G: 3; .375 INJECTION, SOLUTION INTRAVENOUS at 15:58

## 2023-08-26 RX ADMIN — PIPERACILLIN SODIUM AND TAZOBACTAM SODIUM 3.38 G: 3; .375 INJECTION, SOLUTION INTRAVENOUS at 00:06

## 2023-08-26 RX ADMIN — IPRATROPIUM BROMIDE AND ALBUTEROL SULFATE 3 ML: 2.5; .5 SOLUTION RESPIRATORY (INHALATION) at 20:46

## 2023-08-26 RX ADMIN — PIPERACILLIN SODIUM AND TAZOBACTAM SODIUM 3.38 G: 3; .375 INJECTION, SOLUTION INTRAVENOUS at 09:56

## 2023-08-26 RX ADMIN — IPRATROPIUM BROMIDE AND ALBUTEROL SULFATE 3 ML: 2.5; .5 SOLUTION RESPIRATORY (INHALATION) at 11:20

## 2023-08-26 RX ADMIN — PIPERACILLIN SODIUM AND TAZOBACTAM SODIUM 3.38 G: 3; .375 INJECTION, SOLUTION INTRAVENOUS at 23:33

## 2023-08-26 NOTE — PROGRESS NOTES
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  She reports chest pain.    Diet:  NPO.  No nausea or vomiting.    Activity level: Returning to normal.    Pain:  She complains of pain that is mild.      Vital Signs:  Temp:  [97.9 °F (36.6 °C)-100 °F (37.8 °C)] 97.9 °F (36.6 °C)  Heart Rate:  [] 87  Resp:  [18] 18  BP: (90-99)/(53-65) 99/65    Intake & Output (last day)         08/25 0701  08/26 0700 08/26 0701  08/27 0700    P.O.      I.V. (mL/kg) 960 (14.7)     IV Piggyback      Total Intake(mL/kg) 960 (14.7)     Net +960           Urine Unmeasured Occurrence 4 x     Stool Unmeasured Occurrence 1 x             Objective:  General Appearance:  Comfortable, ill-appearing and in no acute distress.    Vital signs: (most recent): Blood pressure 99/65, pulse 87, temperature 97.9 °F (36.6 °C), temperature source Oral, resp. rate 18, height 175.3 cm (69\"), weight 65.3 kg (143 lb 15.4 oz), last menstrual period 08/17/2023, SpO2 100 %.  (Hypotensive, low-grade fever).    Output: Producing urine.    Lungs:  Normal effort and normal respiratory rate.  There are wheezes and rhonchi.    Heart: Normal rate.  Regular rhythm.    Abdomen: There is no abdominal tenderness.     Neurological: Patient is alert and oriented to person, place and time.    Skin:  Warm and dry.              Results Review:     I reviewed the patient's new clinical results.  I reviewed the patient's new imaging results and agree with the interpretation.  I reviewed the patient's other test results and agree with the interpretation    Imaging Results (Last 24 Hours)       Procedure Component Value Units Date/Time    CT Soft Tissue Neck With Contrast [[id number]] Collected: 08/25/23 1333     Updated: 08/25/23 1333    Narrative:      CT OF THE NECK WITH CONTRAST 08/25/2023     HISTORY: Follow-up esophageal mass.     Spiral images were obtained from the orbits to the lung apices after  intravenous " contrast. The previous CT of the chest from yesterday is  compared.     Again seen is soft tissue mass in the right paraesophageal region at the  level of the cervicothoracic junction immediately anterior to the spine.  It measures approximately 2.9 cm by approximately 2.7 cm in transverse  dimensions by approximately 3.3 cm in craniocaudal dimension when it  compressesed the posterior aspect of the right thyroid lobe. This mass  contains a small amount of air. There is mass effect on the rightward  aspect of the esophagus. No esophageal perforation was seen on  yesterday's esophagram.     The airway is patent.     No pathologically enlarged lymph nodes are seen in the neck. A few  shotty nodes are seen. Salivary glands appear within normal limits.  Normal vascular enhancement is seen.     There is moderately severe sinusitis involving the bilateral ethmoid,  bilateral maxillary and sphenoid sinuses. Frontal sinus is not included  in the field-of-view.       Impression:      1. Moderate size right periesophageal soft tissue mass at the  cervicothoracic junction as described. This mass contains a small amount  of air and is unchanged from yesterday's study. This could be an  inflammatory or neoplastic mass.  2. No esophageal perforation was seen on yesterday's esophagram.  3. No pathologic lymphadenopathy or other neck masses are seen.  4. Relatively extensive sinusitis.     Radiation dose reduction techniques were utilized, including automated  exposure control and exposure modulation based on body size.          CT Angiogram Chest Pulmonary Embolism [[id number]] Collected: 08/24/23 1209     Updated: 08/25/23 0851    Narrative:      CT ANGIOGRAM OF THE CHEST. MULTIPLE CORONAL, SAGITTAL, AND 3-D  RECONSTRUCTIONS.     HISTORY: 37-year-old female with chest pain and cough.     TECHNIQUE: Radiation dose reduction techniques were utilized, including  automated exposure control and exposure modulation based on body size.    CT angiogram of the chest was performed following the administration of  IV contrast. Multiple coronal, sagittal, and 3-D reconstruction images  were obtained. Compared with chest CTA and neck CT 04/11/2023. Also  correlated with esophagram 04/11/2023.     FINDINGS: There is no evidence for pulmonary thromboemboli. Again seen  is a complex air-containing mass/collection in the superior right  paraesophageal region at the thoracic inlet and superior mediastinum.  The margins were better seen on the neck CT and appear without  significant change since the chest CTA performed the same day. There is  mass effect on the on the trachea and esophagus to the left. The  measurements are approximately 3.1 x 2.6 cm which is grossly unchanged.  The esophageal wall is indistinct and there is indistinct increased  density throughout the mediastinum and sherwin without discrete  pathologically enlarged nodes. There are no pleural or pericardial  effusions. There is mild asymmetric bronchial thickening at the right  lower lobe which was not definitively present previously and there are  new ill-defined mixed-density peribronchial airspace opacities in the  perihilar region of the left lower lobe and at both lung bases. There is  also ill-defined ground-glass opacification at the right lower lobe.       Impression:      Outside facility neck CT 5/5/2023 has become available for  comparison. This collection was predominantly air-filled on the outside  facility neck CT. A RECURRENT INFECTED RIGHT PARATRACHEAL CYST IS  SUSPECTED TO BE THE ETIOLOGY FOR THESE FINDINGS. THORACIC SURGERY  CONSULTATION IS RECOMMENDED.        1. Persistent or recurrent approximately 3.1 x 2.6 cm complex  collection/mass containing air in the superior right paraesophageal  region. As discussed previously, an infectious or inflammatory etiology  is suspected, but malignancy cannot be entirely excluded. There is mass  effect on the trachea and esophagus to the  left. No definite esophageal  leak was seen on the previous esophagram. Thoracic surgery consultation  is recommended.  2. The new bilateral lower lobe airspace opacities as described likely  represent bronchiolitis/developing pneumonia and aspiration is a  consideration.  3. There is no evidence for pulmonary thromboemboli.           This report was finalized on 8/25/2023 8:47 AM by Dr. Winifred Murcia M.D.               Lab Results:     Lab Results (last 24 hours)       Procedure Component Value Units Date/Time    Comprehensive Metabolic Panel [[id number]]  (Abnormal) Collected: 08/26/23 0520    Specimen: Blood Updated: 08/26/23 0657     Glucose 77 mg/dL      BUN 8 mg/dL      Creatinine 0.54 mg/dL      Sodium 139 mmol/L      Potassium 3.7 mmol/L      Chloride 106 mmol/L      CO2 23.0 mmol/L      Calcium 8.1 mg/dL      Total Protein 5.9 g/dL      Albumin 3.0 g/dL      ALT (SGPT) 26 U/L      AST (SGOT) 19 U/L      Alkaline Phosphatase 129 U/L      Total Bilirubin 0.5 mg/dL      Globulin 2.9 gm/dL      A/G Ratio 1.0 g/dL      BUN/Creatinine Ratio 14.8     Anion Gap 10.0 mmol/L      eGFR 123.3 mL/min/1.73     Narrative:      GFR Normal >60  Chronic Kidney Disease <60  Kidney Failure <15      CBC (No Diff) [[id number]]  (Abnormal) Collected: 08/26/23 0520    Specimen: Blood Updated: 08/26/23 0641     WBC 11.05 10*3/mm3      RBC 3.43 10*6/mm3      Hemoglobin 10.3 g/dL      Hematocrit 30.9 %      MCV 90.1 fL      MCH 30.0 pg      MCHC 33.3 g/dL      RDW 12.2 %      RDW-SD 40.1 fl      MPV 9.7 fL      Platelets 333 10*3/mm3     HIV-1 / O / 2 Ag / Antibody 4th Generation [[id number]] Collected: 08/26/23 0520    Specimen: Blood Updated: 08/26/23 0631    Blood Culture - Blood, Arm, Left [[id number]]  (Normal) Collected: 08/24/23 1452    Specimen: Blood from Arm, Left Updated: 08/25/23 1531     Blood Culture No growth at 24 hours    Blood Culture - Blood, Arm, Left [[id number]]  (Normal) Collected: 08/24/23 1456    Specimen: Blood  from Arm, Left Updated: 08/25/23 1531     Blood Culture No growth at 24 hours    Respiratory Panel PCR w/COVID-19(SARS-CoV-2) KIAN/DARIELA/HECTOR/PAD/COR/MAD/EMILE In-House, NP Swab in UTM/VTM, 3-4 HR TAT - Swab, Nasopharynx [[id number]]  (Abnormal) Collected: 08/25/23 0949    Specimen: Swab from Nasopharynx Updated: 08/25/23 1147     ADENOVIRUS, PCR Not Detected     Coronavirus 229E Not Detected     Coronavirus HKU1 Not Detected     Coronavirus NL63 Not Detected     Coronavirus OC43 Not Detected     COVID19 Not Detected     Human Metapneumovirus Not Detected     Human Rhinovirus/Enterovirus Not Detected     Influenza A PCR Not Detected     Influenza B PCR Not Detected     Parainfluenza Virus 1 Not Detected     Parainfluenza Virus 2 Not Detected     Parainfluenza Virus 3 Not Detected     Parainfluenza Virus 4 Detected     RSV, PCR Not Detected     Bordetella pertussis pcr Not Detected     Bordetella parapertussis PCR Not Detected     Chlamydophila pneumoniae PCR Not Detected     Mycoplasma pneumo by PCR Not Detected    Narrative:      In the setting of a positive respiratory panel with a viral infection PLUS a negative procalcitonin without other underlying concern for bacterial infection, consider observing off antibiotics or discontinuation of antibiotics and continue supportive care. If the respiratory panel is positive for atypical bacterial infection (Bordetella pertussis, Chlamydophila pneumoniae, or Mycoplasma pneumoniae), consider antibiotic de-escalation to target atypical bacterial infection.             Assessment & Plan       Pneumonia    Paratracheal gas collection    Asthma    Neck pain    Infection due to parainfluenza virus 4       Assessment & Plan    Respiratory viral panel positive for parainfluenza.  CT of the neck was performed and demonstrates moderate size right paraesophageal soft tissue mass of the cervical thoracic junction.  This measures approximately 2.9 cm x 2.7 cm x 3.3 cm.  There is a small  amount of air.  There is mass effect on the right lower aspect of the esophagus.  Esophagram demonstrated no evidence of leak or extravasation.  Paraesophageal mass: Patient underwent exploratory procedure in April for a similar issue.    Patient reported that her symptoms started during flight after forceful Valsalva to unclog her ears.  This could be related to similar perforation from tracheal cyst or Montez's dehiscence.  I will plan to do bronchoscopy and EGD early next week.  Leukocytosis improving.  Due to concern for mediastinitis going to keep on IV antibiotics for now.  She can have full liquid diet.  Rest of the care per primary team.    Hemanth La MD  [facility]  08/26/23  08:49 EDT    Greater than 35 minutes was spent reviewing the patient's chart, radiographic imaging, labs, provider notes, assessing the patient and developing a plan of care.  This was discussed with the patient and RN.

## 2023-08-26 NOTE — PROGRESS NOTES
ID NOTE    CC: f/u fever    Subj: RPP positive for paraflu-4. Plans for EGD and bronch Monday to eval paraesophageal fluid collection/mass. Feeling better.     Medications:    Current Facility-Administered Medications:     albuterol (PROVENTIL) nebulizer solution 0.083% 2.5 mg/3mL, 2.5 mg, Nebulization, Q6H PRN, Juan Estrada MD    diazePAM (VALIUM) injection 2.5 mg, 2.5 mg, Intravenous, Q6H PRN, Beverly Araya APRN    ipratropium-albuterol (DUO-NEB) nebulizer solution 3 mL, 3 mL, Nebulization, 4x Daily - RT, Beverly Araya APRN, 3 mL at 08/26/23 0818    morphine injection 2 mg, 2 mg, Intravenous, Q4H PRN, Hemanth La MD, 2 mg at 08/24/23 2233    ondansetron (ZOFRAN) injection 4 mg, 4 mg, Intravenous, Q6H PRN, Juan Estrada MD, 4 mg at 08/24/23 2234    piperacillin-tazobactam (ZOSYN) 3.375 g in iso-osmotic dextrose 50 ml (premix), 3.375 g, Intravenous, Q8H, Mora Friedman MD, Stopped at 08/26/23 0437    sodium chloride 0.9 % flush 10 mL, 10 mL, Intravenous, Q12H, Juan Estrada MD, 10 mL at 08/25/23 2224    sodium chloride 0.9 % flush 10 mL, 10 mL, Intravenous, PRN, Juan Estrada MD    sodium chloride 0.9 % infusion 40 mL, 40 mL, Intravenous, PRN, Juan Estrada MD    sodium chloride 0.9 % infusion, 50 mL/hr, Intravenous, Continuous, Hemanth La MD, Last Rate: 50 mL/hr at 08/26/23 0821, 50 mL/hr at 08/26/23 0821      Objective   Vital Signs   Temp:  [97.9 °F (36.6 °C)-100 °F (37.8 °C)] 97.9 °F (36.6 °C)  Heart Rate:  [] 87  Resp:  [18] 18  BP: (90-99)/(53-65) 99/65    Physical Exam:   General: awake, alert, NAD, very nice  Eyes: no scleral icterus  Neck: healed incision  Cardiovascular: NR  Respiratory: BLL rales a little better  :  no Keating catheter  Skin: No rashes  Neurological: Alert and oriented x 3  Psychiatric: Normal mood and affect     Labs:   CBC, CMP, RPP, and blood cultures reviewed today  Lab Results   Component Value Date    WBC 11.05 (H) 08/26/2023    HGB 10.3 (L)  08/26/2023    HCT 30.9 (L) 08/26/2023    MCV 90.1 08/26/2023     08/26/2023       Lab Results   Component Value Date    GLUCOSE 77 08/26/2023    BUN 8 08/26/2023    CREATININE 0.54 (L) 08/26/2023    BCR 14.8 08/26/2023    CO2 23.0 08/26/2023    CALCIUM 8.1 (L) 08/26/2023    ALBUMIN 3.0 (L) 08/26/2023    AST 19 08/26/2023    ALT 26 08/26/2023     HIV pending  HCG negative  Procal 0.08    Microbiology:  8/24 COVID: negative  8/24 BCx: NGTD  8/25 RPP + paraflu 4    New radiology:  CT Neck:   1. Moderate size right periesophageal soft tissue mass at the   cervicothoracic junction as described. This mass contains a small amount   of air and is unchanged from yesterday's study. This could be an   inflammatory or neoplastic mass.   2. No esophageal perforation was seen on yesterday's esophagram.   3. No pathologic lymphadenopathy or other neck masses are seen.   4. Relatively extensive sinusitis.     ASSESSMENT/PLAN:  Right paraesophageal fluid collection with mass effect  Bilateral lower lobe infiltrates  Fever in adult  Diarrhea  Parainfluenza 4 infection with sinusitis    RPP positive for parainfluenza 4 and this probably accounts for her most recent symptoms. She has a sick child at home so that is the likely contact.     CT neck report reviewed and I reviewed the thoracic surgery note. Plans for bronch and EGD on Monday 8/28. I will continue empiric Zosyn. ID will follow and see again on 8/28.

## 2023-08-26 NOTE — PROGRESS NOTES
"DAILY PROGRESS NOTE  [facility]    Patient Identification:  Name: Wilda Mayo  Age: 37 y.o.  Sex: female  :  1985  MRN: [id number]         Primary Care Physician: Provider, No Known    Subjective:  Interval History: She is hungry and she got some liquids.    Objective:    Scheduled Meds:ipratropium-albuterol, 3 mL, Nebulization, 4x Daily - RT  piperacillin-tazobactam, 3.375 g, Intravenous, Q8H  sodium chloride, 10 mL, Intravenous, Q12H      Continuous Infusions:sodium chloride, 50 mL/hr, Last Rate: 50 mL/hr (23 0821)        Vital signs in last 24 hours:  Temp:  [97.9 °F (36.6 °C)-98.6 °F (37 °C)] 97.9 °F (36.6 °C)  Heart Rate:  [] 87  Resp:  [18] 18  BP: (90-99)/(53-65) 94/58    Intake/Output:    Intake/Output Summary (Last 24 hours) at 2023 1538  Last data filed at 2023 0436  Gross per 24 hour   Intake 960 ml   Output --   Net 960 ml         Exam:  BP 94/58 (BP Location: Left arm, Patient Position: Sitting)   Pulse 87   Temp 97.9 °F (36.6 °C) (Oral)   Resp 18   Ht 175.3 cm (69\")   Wt 65.3 kg (143 lb 15.4 oz)   LMP 2023 (Approximate)   SpO2 100%   BMI 21.26 kg/m²     General Appearance:    Alert, cooperative, no distress   Head:    Normocephalic, without obvious abnormality, atraumatic   Eyes:       Throat:   Lips, tongue, gums normal   Neck:   Supple, symmetrical, trachea midline, no JVD   Lungs:     Clear to auscultation bilaterally, respirations unlabored   Chest Wall:    No tenderness or deformity    Heart:    Regular rate and rhythm, S1 and S2 normal, no murmur,no  Rub or gallop   Abdomen:     Soft, nontender, bowel sounds active, no masses, no organomegaly    Extremities:   Extremities normal, atraumatic, no cyanosis or edema   Pulses:      Skin:   Skin is warm and dry,  no rashes or palpable lesions   Neurologic:   no focal deficits noted      Lab Results (last 72 hours)       Procedure Component Value Units Date/Time    Respiratory Panel " PCR w/COVID-19(SARS-CoV-2) KIAN/DARIELA/HECTOR/PAD/COR/MAD/EMILE In-House, NP Swab in UTM/VTM, 3-4 HR TAT - Swab, Nasopharynx [[id number]]  (Abnormal) Collected: 08/25/23 0949    Specimen: Swab from Nasopharynx Updated: 08/25/23 1147     ADENOVIRUS, PCR Not Detected     Coronavirus 229E Not Detected     Coronavirus HKU1 Not Detected     Coronavirus NL63 Not Detected     Coronavirus OC43 Not Detected     COVID19 Not Detected     Human Metapneumovirus Not Detected     Human Rhinovirus/Enterovirus Not Detected     Influenza A PCR Not Detected     Influenza B PCR Not Detected     Parainfluenza Virus 1 Not Detected     Parainfluenza Virus 2 Not Detected     Parainfluenza Virus 3 Not Detected     Parainfluenza Virus 4 Detected     RSV, PCR Not Detected     Bordetella pertussis pcr Not Detected     Bordetella parapertussis PCR Not Detected     Chlamydophila pneumoniae PCR Not Detected     Mycoplasma pneumo by PCR Not Detected    Narrative:      In the setting of a positive respiratory panel with a viral infection PLUS a negative procalcitonin without other underlying concern for bacterial infection, consider observing off antibiotics or discontinuation of antibiotics and continue supportive care. If the respiratory panel is positive for atypical bacterial infection (Bordetella pertussis, Chlamydophila pneumoniae, or Mycoplasma pneumoniae), consider antibiotic de-escalation to target atypical bacterial infection.    Comprehensive Metabolic Panel [[id number]]  (Abnormal) Collected: 08/25/23 0724    Specimen: Blood Updated: 08/25/23 0823     Glucose 84 mg/dL      BUN 8 mg/dL      Creatinine 0.64 mg/dL      Sodium 142 mmol/L      Potassium 3.6 mmol/L      Chloride 108 mmol/L      CO2 20.3 mmol/L      Calcium 8.4 mg/dL      Total Protein 5.9 g/dL      Albumin 3.2 g/dL      ALT (SGPT) 28 U/L      AST (SGOT) 12 U/L      Alkaline Phosphatase 78 U/L      Total Bilirubin 0.4 mg/dL      Globulin 2.7 gm/dL      A/G Ratio 1.2 g/dL       BUN/Creatinine Ratio 12.5     Anion Gap 13.7 mmol/L      eGFR 118.4 mL/min/1.73     Narrative:      GFR Normal >60  Chronic Kidney Disease <60  Kidney Failure <15      CBC Auto Differential [[id number]]  (Abnormal) Collected: 08/25/23 0724    Specimen: Blood Updated: 08/25/23 0803     WBC 17.68 10*3/mm3      RBC 3.75 10*6/mm3      Hemoglobin 11.3 g/dL      Hematocrit 33.7 %      MCV 89.9 fL      MCH 30.1 pg      MCHC 33.5 g/dL      RDW 12.3 %      RDW-SD 40.0 fl      MPV 9.5 fL      Platelets 346 10*3/mm3      Neutrophil % 81.1 %      Lymphocyte % 10.0 %      Monocyte % 6.4 %      Eosinophil % 1.6 %      Basophil % 0.3 %      Immature Grans % 0.6 %      Neutrophils, Absolute 14.32 10*3/mm3      Lymphocytes, Absolute 1.76 10*3/mm3      Monocytes, Absolute 1.14 10*3/mm3      Eosinophils, Absolute 0.29 10*3/mm3      Basophils, Absolute 0.06 10*3/mm3      Immature Grans, Absolute 0.11 10*3/mm3      nRBC 0.0 /100 WBC     Lactic Acid, Plasma [[id number]]  (Normal) Collected: 08/24/23 1452    Specimen: Blood from Arm, Left Updated: 08/24/23 1532     Lactate 1.0 mmol/L     Blood Culture - Blood, Arm, Left [[id number]] Collected: 08/24/23 1452    Specimen: Blood from Arm, Left Updated: 08/24/23 1515    Blood Culture - Blood, Arm, Left [[id number]] Collected: 08/24/23 1456    Specimen: Blood from Arm, Left Updated: 08/24/23 1515    High Sensitivity Troponin T 2Hr [[id number]] Collected: 08/24/23 1229    Specimen: Blood Updated: 08/24/23 1301     HS Troponin T <6 ng/L      Troponin T Delta --     Comment: Unable to calculate.       Narrative:      High Sensitive Troponin T Reference Range:  <10.0 ng/L- Negative Female for AMI  <15.0 ng/L- Negative Male for AMI  >=10 - Abnormal Female indicating possible myocardial injury.  >=15 - Abnormal Male indicating possible myocardial injury.   Clinicians would have to utilize clinical acumen, EKG, Troponin, and serial changes to determine if it is an Acute Myocardial Infarction or myocardial  "injury due to an underlying chronic condition.         COVID-19,[facility] KIAN IN-HOUSE CEPHEID/CHINYERE NP SWAB IN TRANSPORT MEDIA 8-12 HR TAT - Swab, Nasopharynx [[id number]]  (Normal) Collected: 08/24/23 1005    Specimen: Swab from Nasopharynx Updated: 08/24/23 1055     COVID19 Not Detected    Narrative:      Fact sheet for providers: https://www.fda.gov/media/833114/download     Fact sheet for patients: https://www.fda.gov/media/284583/download    High Sensitivity Troponin T [[id number]]  (Normal) Collected: 08/24/23 1009    Specimen: Blood Updated: 08/24/23 1050     HS Troponin T <6 ng/L     Narrative:      High Sensitive Troponin T Reference Range:  <10.0 ng/L- Negative Female for AMI  <15.0 ng/L- Negative Male for AMI  >=10 - Abnormal Female indicating possible myocardial injury.  >=15 - Abnormal Male indicating possible myocardial injury.   Clinicians would have to utilize clinical acumen, EKG, Troponin, and serial changes to determine if it is an Acute Myocardial Infarction or myocardial injury due to an underlying chronic condition.         Procalcitonin [[id number]]  (Normal) Collected: 08/24/23 1009    Specimen: Blood Updated: 08/24/23 1050     Procalcitonin 0.08 ng/mL     Narrative:      As a Marker for Sepsis (Non-Neonates):    1. <0.5 ng/mL represents a low risk of severe sepsis and/or septic shock.  2. >2 ng/mL represents a high risk of severe sepsis and/or septic shock.    As a Marker for Lower Respiratory Tract Infections that require antibiotic therapy:    PCT on Admission    Antibiotic Therapy       6-12 Hrs later    >0.5                Strongly Recommended  >0.25 - <0.5        Recommended   0.1 - 0.25          Discouraged              Remeasure/reassess PCT  <0.1                Strongly Discouraged     Remeasure/reassess PCT    As 28 day mortality risk marker: \"Change in Procalcitonin Result\" (>80% or <=80%) if Day 0 (or Day 1) and Day 4 values are available. Refer to " [email or web address]-pct-[email or web address]    Change in PCT <=80%  A decrease of PCT levels below or equal to 80% defines a positive change in PCT test result representing a higher risk for 28-day all-cause mortality of patients diagnosed with severe sepsis for septic shock.    Change in PCT >80%  A decrease of PCT levels of more than 80% defines a negative change in PCT result representing a lower risk for 28-day all-cause mortality of patients diagnosed with severe sepsis or septic shock.       Basic Metabolic Panel [[id number]]  (Abnormal) Collected: 08/24/23 1009    Specimen: Blood Updated: 08/24/23 1043     Glucose 108 mg/dL      BUN 5 mg/dL      Creatinine 0.65 mg/dL      Sodium 138 mmol/L      Potassium 3.8 mmol/L      Chloride 105 mmol/L      CO2 24.4 mmol/L      Calcium 9.1 mg/dL      BUN/Creatinine Ratio 7.7     Anion Gap 8.6 mmol/L      eGFR 117.9 mL/min/1.73     Narrative:      GFR Normal >60  Chronic Kidney Disease <60  Kidney Failure <15      hCG, Serum, Qualitative [[id number]]  (Normal) Collected: 08/24/23 1009    Specimen: Blood Updated: 08/24/23 1038     HCG Qualitative Negative    CBC & Differential [[id number]]  (Abnormal) Collected: 08/24/23 1009    Specimen: Blood Updated: 08/24/23 1023    Narrative:      The following orders were created for panel order CBC & Differential.  Procedure                               Abnormality         Status                     ---------                               -----------         ------                     CBC Auto Differential[[id number]]        Abnormal            Final result                 Please view results for these tests on the individual orders.    CBC Auto Differential [[id number]]  (Abnormal) Collected: 08/24/23 1009    Specimen: Blood Updated: 08/24/23 1023     WBC 16.62 10*3/mm3      RBC 4.33 10*6/mm3      Hemoglobin 12.8 g/dL      Hematocrit 39.2 %      MCV 90.5 fL      MCH 29.6 pg      MCHC 32.7 g/dL      RDW 12.7 %      RDW-SD 41.9 fl      MPV 9.6  fL      Platelets 347 10*3/mm3      Neutrophil % 76.1 %      Lymphocyte % 12.0 %      Monocyte % 6.5 %      Eosinophil % 4.6 %      Basophil % 0.4 %      Immature Grans % 0.4 %      Neutrophils, Absolute 12.65 10*3/mm3      Lymphocytes, Absolute 2.00 10*3/mm3      Monocytes, Absolute 1.08 10*3/mm3      Eosinophils, Absolute 0.76 10*3/mm3      Basophils, Absolute 0.06 10*3/mm3      Immature Grans, Absolute 0.07 10*3/mm3      nRBC 0.0 /100 WBC           Data Review:  Results from last 7 days   Lab Units 08/26/23  0520 08/25/23  0724 08/24/23  1009   SODIUM mmol/L 139 142 138   POTASSIUM mmol/L 3.7 3.6 3.8   CHLORIDE mmol/L 106 108* 105   CO2 mmol/L 23.0 20.3* 24.4   BUN mg/dL 8 8 5*   CREATININE mg/dL 0.54* 0.64 0.65   GLUCOSE mg/dL 77 84 108*   CALCIUM mg/dL 8.1* 8.4* 9.1       Results from last 7 days   Lab Units 08/26/23  0520 08/25/23  0724 08/24/23  1009   WBC 10*3/mm3 11.05* 17.68* 16.62*   HEMOGLOBIN g/dL 10.3* 11.3* 12.8   HEMATOCRIT % 30.9* 33.7* 39.2   PLATELETS 10*3/mm3 333 346 347               Lab Results   Lab Value Date/Time    TROPONINT <6 08/24/2023 1229    TROPONINT <6 08/24/2023 1009    TROPONINT <6 04/11/2023 1322    TROPONINT <6 04/11/2023 1145         Results from last 7 days   Lab Units 08/26/23  0520 08/25/23  0724   ALK PHOS U/L 129* 78   BILIRUBIN mg/dL 0.5 0.4   ALT (SGPT) U/L 26 28   AST (SGOT) U/L 19 12               No results found for: POCGLU        Past Medical History:   Diagnosis Date    Asthma     Pneumonia 8/24/2023       Assessment:  Active Hospital Problems    Diagnosis  POA    **Pneumonia [J18.9]  Yes    Infection due to parainfluenza virus 4 [B34.8]  Yes    Neck pain [M54.2]  Yes    Asthma [J45.909]  Yes    Paratracheal gas collection [R93.89]  Yes      Resolved Hospital Problems   No resolved problems to display.       Plan:  Continue with antibiotics per infectious disease.  Follow-up lab and cultures.   thoracic surgery consult noted.  Plans for bronchoscopy and EGD next  week.  Pulmonary consult noted.    Aung Barber MD  8/26/2023  15:38 EDT

## 2023-08-27 LAB
ANION GAP SERPL CALCULATED.3IONS-SCNC: 8 MMOL/L (ref 5–15)
BASOPHILS # BLD AUTO: 0.05 10*3/MM3 (ref 0–0.2)
BASOPHILS NFR BLD AUTO: 0.7 % (ref 0–1.5)
BUN SERPL-MCNC: 5 MG/DL (ref 6–20)
BUN/CREAT SERPL: 8.9 (ref 7–25)
CALCIUM SPEC-SCNC: 8.2 MG/DL (ref 8.6–10.5)
CHLORIDE SERPL-SCNC: 106 MMOL/L (ref 98–107)
CO2 SERPL-SCNC: 25 MMOL/L (ref 22–29)
CREAT SERPL-MCNC: 0.56 MG/DL (ref 0.57–1)
DEPRECATED RDW RBC AUTO: 40.1 FL (ref 37–54)
EGFRCR SERPLBLD CKD-EPI 2021: 122.2 ML/MIN/1.73
EOSINOPHIL # BLD AUTO: 0.76 10*3/MM3 (ref 0–0.4)
EOSINOPHIL NFR BLD AUTO: 10.4 % (ref 0.3–6.2)
ERYTHROCYTE [DISTWIDTH] IN BLOOD BY AUTOMATED COUNT: 12.2 % (ref 12.3–15.4)
GLUCOSE SERPL-MCNC: 95 MG/DL (ref 65–99)
HCT VFR BLD AUTO: 29.1 % (ref 34–46.6)
HGB BLD-MCNC: 9.5 G/DL (ref 12–15.9)
IMM GRANULOCYTES # BLD AUTO: 0.03 10*3/MM3 (ref 0–0.05)
IMM GRANULOCYTES NFR BLD AUTO: 0.4 % (ref 0–0.5)
LYMPHOCYTES # BLD AUTO: 1.8 10*3/MM3 (ref 0.7–3.1)
LYMPHOCYTES NFR BLD AUTO: 24.6 % (ref 19.6–45.3)
MCH RBC QN AUTO: 29.4 PG (ref 26.6–33)
MCHC RBC AUTO-ENTMCNC: 32.6 G/DL (ref 31.5–35.7)
MCV RBC AUTO: 90.1 FL (ref 79–97)
MONOCYTES # BLD AUTO: 0.6 10*3/MM3 (ref 0.1–0.9)
MONOCYTES NFR BLD AUTO: 8.2 % (ref 5–12)
NEUTROPHILS NFR BLD AUTO: 4.08 10*3/MM3 (ref 1.7–7)
NEUTROPHILS NFR BLD AUTO: 55.7 % (ref 42.7–76)
NRBC BLD AUTO-RTO: 0 /100 WBC (ref 0–0.2)
PLATELET # BLD AUTO: 334 10*3/MM3 (ref 140–450)
PMV BLD AUTO: 9.5 FL (ref 6–12)
POTASSIUM SERPL-SCNC: 3.7 MMOL/L (ref 3.5–5.2)
RBC # BLD AUTO: 3.23 10*6/MM3 (ref 3.77–5.28)
SODIUM SERPL-SCNC: 139 MMOL/L (ref 136–145)
WBC NRBC COR # BLD: 7.32 10*3/MM3 (ref 3.4–10.8)

## 2023-08-27 PROCEDURE — 25010000002 PIPERACILLIN SOD-TAZOBACTAM PER 1 G: Performed by: INTERNAL MEDICINE

## 2023-08-27 PROCEDURE — 94799 UNLISTED PULMONARY SVC/PX: CPT

## 2023-08-27 PROCEDURE — 99232 SBSQ HOSP IP/OBS MODERATE 35: CPT | Performed by: SURGERY

## 2023-08-27 PROCEDURE — 80048 BASIC METABOLIC PNL TOTAL CA: CPT | Performed by: HOSPITALIST

## 2023-08-27 PROCEDURE — 94761 N-INVAS EAR/PLS OXIMETRY MLT: CPT

## 2023-08-27 PROCEDURE — 85025 COMPLETE CBC W/AUTO DIFF WBC: CPT | Performed by: HOSPITALIST

## 2023-08-27 PROCEDURE — G0378 HOSPITAL OBSERVATION PER HR: HCPCS

## 2023-08-27 PROCEDURE — 94664 DEMO&/EVAL PT USE INHALER: CPT

## 2023-08-27 PROCEDURE — 99232 SBSQ HOSP IP/OBS MODERATE 35: CPT | Performed by: INTERNAL MEDICINE

## 2023-08-27 RX ADMIN — IPRATROPIUM BROMIDE AND ALBUTEROL SULFATE 3 ML: 2.5; .5 SOLUTION RESPIRATORY (INHALATION) at 16:02

## 2023-08-27 RX ADMIN — PIPERACILLIN SODIUM AND TAZOBACTAM SODIUM 3.38 G: 3; .375 INJECTION, SOLUTION INTRAVENOUS at 08:22

## 2023-08-27 RX ADMIN — PIPERACILLIN SODIUM AND TAZOBACTAM SODIUM 3.38 G: 3; .375 INJECTION, SOLUTION INTRAVENOUS at 16:07

## 2023-08-27 RX ADMIN — IPRATROPIUM BROMIDE AND ALBUTEROL SULFATE 3 ML: 2.5; .5 SOLUTION RESPIRATORY (INHALATION) at 12:13

## 2023-08-27 RX ADMIN — IPRATROPIUM BROMIDE AND ALBUTEROL SULFATE 3 ML: 2.5; .5 SOLUTION RESPIRATORY (INHALATION) at 08:29

## 2023-08-27 RX ADMIN — Medication 10 ML: at 08:22

## 2023-08-27 RX ADMIN — IPRATROPIUM BROMIDE AND ALBUTEROL SULFATE 3 ML: 2.5; .5 SOLUTION RESPIRATORY (INHALATION) at 20:27

## 2023-08-27 NOTE — PROGRESS NOTES
[facility]  [phone]  Dr. Lamont Robles     Subjective:  LOS: 0    Chief Complaint:  SOB and chest pain     Patient was doing well this morning.  She denies any acute concerns or complaints.  Discussed current plan of care she had no additional questions.    Objective   Vital Signs past 24hrs  Temp range: Temp (24hrs), Av °F (36.7 °C), Min:97.9 °F (36.6 °C), Max:98.2 °F (36.8 °C)    BP range: BP: ()/(58-67) 91/58  Pulse range: Heart Rate:  [75-98] 79  Resp rate range: Resp:  [18] 18  Device (Oxygen Therapy): room air   Oxygen range:SpO2:  [97 %-100 %] 100 %     Physical Exam  Constitutional:       Appearance: Normal appearance.   HENT:      Head: Normocephalic and atraumatic.   Eyes:      Extraocular Movements: Extraocular movements intact.      Pupils: Pupils are equal, round, and reactive to light.   Cardiovascular:      Rate and Rhythm: Normal rate and regular rhythm.      Heart sounds: No murmur heard.  Pulmonary:      Effort: Pulmonary effort is normal. No respiratory distress.      Breath sounds: Normal breath sounds. No wheezing or rales.   Abdominal:      General: Abdomen is flat.      Palpations: Abdomen is soft.      Tenderness: There is no abdominal tenderness.   Musculoskeletal:         General: No swelling. Normal range of motion.      Cervical back: Normal range of motion. No tenderness.   Skin:     General: Skin is warm and dry.      Findings: No rash.   Neurological:      General: No focal deficit present.      Mental Status: She is alert and oriented to person, place, and time.   Psychiatric:         Mood and Affect: Mood normal.         Behavior: Behavior normal.     Results Review:    I have reviewed the laboratory and imaging data since the last note by [facility] physician.  My annotations are noted in assessment and plan.      Result Review:  I have personally reviewed the results from last note by [facility] physician to 2023 09:44 EDT and agree with these  findings:  [x]  Laboratory list / accordion  [x]  Microbiology  [x]  Radiology  [x]  EKG/Telemetry   [x]  Cardiology/Vascular   [x]  Pathology  [x]  Old records  []  Other:    Medication Review:  I have reviewed the current MAR.  My annotations are noted in assessment and plan.    ipratropium-albuterol, 3 mL, Nebulization, 4x Daily - RT  piperacillin-tazobactam, 3.375 g, Intravenous, Q8H  sodium chloride, 10 mL, Intravenous, Q12H        sodium chloride, 50 mL/hr, Last Rate: 50 mL/hr (08/26/23 0821)      Lines, Drains & Airways       Active LDAs       Name Placement date Placement time Site Days    Peripheral IV 08/24/23 1008 Right Antecubital 08/24/23  1008  Antecubital  2                  No active isolations  Diet Orders (active) (From admission, onward)       Start     Ordered    08/26/23 0804  Diet: Liquid Diets; Full Liquid; Texture: Regular Texture (IDDSI 7); Fluid Consistency: Thin (IDDSI 0)  Diet Effective Now         08/26/23 0803                      Assessment  Possible Aspiration with Pneumonia   Parainfluenza +  Right paraesophageal mass   Leukocytosis  History of recent mediastinitis  History of asthma    Plan  - Continue current antibiotics with stop date   - CT chest showing the paraesophageal mass and some ill definied bilateral scattered opacities in the lung bases and bronchial thickening of RLL, possibly related to aspiration  - CT neck showing Moderate size right periesophageal soft tissue mass containing small amount of air  - Thoracic surgery following and planning bronch with EGD early next week  - on room air  - supportive care for parainfluenza   - will follow along peripherally and await results of bronch by CTS this week      Lamont Robles DO   08/27/23  09:44 EDT      Part of this note may be an electronic transcription/translation of spoken language to printed text using the Dragon Dictation System.

## 2023-08-27 NOTE — PROGRESS NOTES
ID NOTE    CC: f/u fever    Subj: Fever resolved. Feeling better. Cultures negative. WBC normal. Her  and son brought her flowers.     Medications:    Current Facility-Administered Medications:     albuterol (PROVENTIL) nebulizer solution 0.083% 2.5 mg/3mL, 2.5 mg, Nebulization, Q6H PRN, Juan Estrada MD    diazePAM (VALIUM) injection 2.5 mg, 2.5 mg, Intravenous, Q6H PRN, Beverly Araya APRN    ipratropium-albuterol (DUO-NEB) nebulizer solution 3 mL, 3 mL, Nebulization, 4x Daily - RT, Beverly Araya APRN, 3 mL at 08/27/23 1213    morphine injection 2 mg, 2 mg, Intravenous, Q4H PRN, Hemanth La MD, 2 mg at 08/24/23 2233    ondansetron (ZOFRAN) injection 4 mg, 4 mg, Intravenous, Q6H PRN, Juan Estrada MD, 4 mg at 08/24/23 2234    piperacillin-tazobactam (ZOSYN) 3.375 g in iso-osmotic dextrose 50 ml (premix), 3.375 g, Intravenous, Q8H, Mora Friedman MD, Last Rate: 0 mL/hr at 08/27/23 0644, 3.375 g at 08/27/23 0822    sodium chloride 0.9 % flush 10 mL, 10 mL, Intravenous, Q12H, Juan Estrada MD, 10 mL at 08/27/23 0822    sodium chloride 0.9 % flush 10 mL, 10 mL, Intravenous, PRN, Juan Estrada MD    sodium chloride 0.9 % infusion 40 mL, 40 mL, Intravenous, PRNSean Abhishek, MD    sodium chloride 0.9 % infusion, 50 mL/hr, Intravenous, ContinuousBessie Nabeel, MD, Last Rate: 50 mL/hr at 08/26/23 0821, 50 mL/hr at 08/26/23 0821      Objective   Vital Signs   Temp:  [97.9 °F (36.6 °C)-98.2 °F (36.8 °C)] 98.2 °F (36.8 °C)  Heart Rate:  [75-98] 77  Resp:  [18] 18  BP: ()/(58-67) 91/58    Physical Exam:   General: awake, alert, NAD, very nice  Eyes: no scleral icterus  Neck: healed incision  Cardiovascular: NR  Respiratory: BLL rales improved  Skin: No rashes  Neurological: Alert and oriented x 3  Psychiatric: Normal mood and affect     Labs:   CBC, BMP, and blood cultures reviewed today  Lab Results   Component Value Date    WBC 7.32 08/27/2023    HGB 9.5 (L) 08/27/2023    HCT 29.1 (L)  08/27/2023    MCV 90.1 08/27/2023     08/27/2023     Lab Results   Component Value Date    GLUCOSE 95 08/27/2023    CALCIUM 8.2 (L) 08/27/2023     08/27/2023    K 3.7 08/27/2023    CO2 25.0 08/27/2023     08/27/2023    BUN 5 (L) 08/27/2023    CREATININE 0.56 (L) 08/27/2023    EGFR 122.2 08/27/2023    BCR 8.9 08/27/2023    ANIONGAP 8.0 08/27/2023     HIV negative  HCG negative  Procal 0.08    Microbiology:  8/24 COVID: negative  8/24 BCx: NGTD  8/25 RPP + paraflu 4    Prior Radiology:  CT Neck:   1. Moderate size right periesophageal soft tissue mass at the cervicothoracic junction as described. This mass contains a small amount of air and is unchanged from yesterday's study. This could be an inflammatory or neoplastic mass.   2. No esophageal perforation was seen on yesterday's esophagram.   3. No pathologic lymphadenopathy or other neck masses are seen.   4. Relatively extensive sinusitis.     ASSESSMENT/PLAN:  Right paraesophageal fluid collection with mass effect  Bilateral lower lobe infiltrates  Fever in adult - resolved  Diarrhea  Parainfluenza 4 infection with sinusitis    RPP positive for parainfluenza 4 and this probably accounts for her most recent symptoms. She has a sick child at home so that is the likely contact. This is a self-limited illness.     Noted plans for bronch and EGD on Monday 8/28. I will continue empiric Zosyn with duration TBD. WBC has normalized on Zosyn. ID will follow.

## 2023-08-27 NOTE — PROGRESS NOTES
Name: Wilda Mayo ADMIT: 2023   : 1985  PCP: Provider, No Known    MRN: [id number] LOS: 0 days   AGE/SEX: 37 y.o. female  ROOM: [facility]     Subjective   Subjective      Patient is seen at bedside, no new complaints.       Objective   Objective   Vital Signs  Temp:  [97.9 °F (36.6 °C)-98.4 °F (36.9 °C)] 98.4 °F (36.9 °C)  Heart Rate:  [] 106  Resp:  [18] 18  BP: ()/(58-67) 103/58  SpO2:  [97 %-100 %] 100 %  on   ;   Device (Oxygen Therapy): room air  Body mass index is 21.26 kg/m².  Physical Exam    General, awake and alert.  Head and ENT, normocephalic and atraumatic.  Neck scar noted  Lungs, symmetric expansion, equal air entry bilaterally.  Heart, regular rate and rhythm.  Abdomen, soft and nontender.  Extremities, no clubbing or cyanosis.  Neuro, no focal deficits.  Skin: Warm and no rash.  Psych, normal mood and affect.  Musculoskeletal, joint examination is grossly normal.        Copied text material from yesterday's note has been reviewed for appropriate changes and remains accurate as of 23.        Results Review     I reviewed the patient's new clinical results.  Results from last 7 days   Lab Units 23  0617 23  0520 23  0724 23  1009   WBC 10*3/mm3 7.32 11.05* 17.68* 16.62*   HEMOGLOBIN g/dL 9.5* 10.3* 11.3* 12.8   PLATELETS 10*3/mm3 334 333 346 347     Results from last 7 days   Lab Units 23  0617 23  0520 23  0724 23  1009   SODIUM mmol/L 139 139 142 138   POTASSIUM mmol/L 3.7 3.7 3.6 3.8   CHLORIDE mmol/L 106 106 108* 105   CO2 mmol/L 25.0 23.0 20.3* 24.4   BUN mg/dL 5* 8 8 5*   CREATININE mg/dL 0.56* 0.54* 0.64 0.65   GLUCOSE mg/dL 95 77 84 108*   EGFR mL/min/1.73 122.2 123.3 118.4 117.9     Results from last 7 days   Lab Units 23  0520 23  0724   ALBUMIN g/dL 3.0* 3.2*   BILIRUBIN mg/dL 0.5 0.4   ALK PHOS U/L 129* 78   AST (SGOT) U/L 19 12   ALT (SGPT) U/L 26 28     Results from last 7 days   Lab Units  08/27/23  0617 08/26/23  0520 08/25/23  0724 08/24/23  1009   CALCIUM mg/dL 8.2* 8.1* 8.4* 9.1   ALBUMIN g/dL  --  3.0* 3.2*  --      Results from last 7 days   Lab Units 08/24/23  1452 08/24/23  1009   PROCALCITONIN ng/mL  --  0.08   LACTATE mmol/L 1.0  --      No results found for: HGBA1C, POCGLU    No radiology results for the last day    I have personally reviewed all medications:  Scheduled Medications  ipratropium-albuterol, 3 mL, Nebulization, 4x Daily - RT  piperacillin-tazobactam, 3.375 g, Intravenous, Q8H  sodium chloride, 10 mL, Intravenous, Q12H    Infusions  sodium chloride, 50 mL/hr, Last Rate: 50 mL/hr (08/26/23 0821)    Diet  NPO Diet NPO Type: Sips with Meds  Diet: Regular/House Diet; Texture: Mechanical Ground (NDD 2); Fluid Consistency: Thin (IDDSI 0)    I have personally reviewed:  [x]  Laboratory   [x]  Microbiology   [x]  Radiology   [x]  EKG/Telemetry  [x]  Cardiology/Vascular   []  Pathology    []  Records       Assessment/Plan     Active Hospital Problems    Diagnosis  POA    **Pneumonia [J18.9]  Yes    Infection due to parainfluenza virus 4 [B34.8]  Yes    Neck pain [M54.2]  Yes    Asthma [J45.909]  Yes    Paratracheal gas collection [R93.89]  Yes      Resolved Hospital Problems   No resolved problems to display.       37 y.o. female admitted with Pneumonia.    Assessment and plan  1.  Complex collection/mass containing air in the right superior paraesophageal region.  There is mass effect on trachea and esophagus on the left.  Thoracic surgery consulted for further input and management. RPP positive for parainfluenza 4 and this probably accounts for her most recent symptoms. She has a sick child at home so that is the likely contact. This is a self-limited illness.      2.  Pneumonia, pulmonary and infectious disease on board.      3.  Paraesophageal mass, keep patient n.p.o. with no sips, ice chips or oral meds. Noted plans for bronch and EGD on Monday 8/28.      4.  History of asthma,  chronic condition, does not appear to be in exacerbation.     5.  CODE STATUS is full code.  Further plans based on hospital course.         Juan Estrada MD  [facility]  08/27/23  18:16 EDT

## 2023-08-27 NOTE — PROGRESS NOTES
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  (Mild neck pain).    Diet:  Adequate intake.  No nausea or vomiting.    Activity level: Returning to normal.    Pain:  She complains of pain that is mild.  She reports pain is improving.      Vital Signs:  Temp:  [97.9 °F (36.6 °C)-98.2 °F (36.8 °C)] 98.2 °F (36.8 °C)  Heart Rate:  [75-98] 79  Resp:  [18] 18  BP: ()/(58-67) 91/58    Intake & Output (last day)         08/26 0701  08/27 0700 08/27 0701  08/28 0700    P.O. 210     I.V. (mL/kg)      IV Piggyback 50     Total Intake(mL/kg) 260 (4)     Net +260                   Objective:  General Appearance:  Comfortable, ill-appearing and in no acute distress.    Vital signs: (most recent): Blood pressure 91/58, pulse 79, temperature 98.2 °F (36.8 °C), temperature source Oral, resp. rate 18, height 175.3 cm (69\"), weight 65.3 kg (143 lb 15.4 oz), last menstrual period 08/17/2023, SpO2 100 %.  (Hypotensive, low-grade fever).    Output: Producing urine.    Lungs:  Normal effort and normal respiratory rate.  There are wheezes and rhonchi.    Heart: Normal rate.  Regular rhythm.    Abdomen: There is no abdominal tenderness.     Neurological: Patient is alert and oriented to person, place and time.    Skin:  Warm and dry.            Results Review:     I reviewed the patient's new clinical results.  I reviewed the patient's new imaging results and agree with the interpretation.  I reviewed the patient's other test results and agree with the interpretation    Imaging Results (Last 24 Hours)       ** No results found for the last 24 hours. **            Lab Results:     Lab Results (last 24 hours)       Procedure Component Value Units Date/Time    Basic Metabolic Panel [[id number]]  (Abnormal) Collected: 08/27/23 0617    Specimen: Blood Updated: 08/27/23 0753     Glucose 95 mg/dL      BUN 5 mg/dL      Creatinine 0.56 mg/dL      Sodium 139 mmol/L      Potassium 3.7 " mmol/L      Chloride 106 mmol/L      CO2 25.0 mmol/L      Calcium 8.2 mg/dL      BUN/Creatinine Ratio 8.9     Anion Gap 8.0 mmol/L      eGFR 122.2 mL/min/1.73     Narrative:      GFR Normal >60  Chronic Kidney Disease <60  Kidney Failure <15      CBC & Differential [[id number]]  (Abnormal) Collected: 08/27/23 0617    Specimen: Blood Updated: 08/27/23 0733    Narrative:      The following orders were created for panel order CBC & Differential.  Procedure                               Abnormality         Status                     ---------                               -----------         ------                     CBC Auto Differential[[id number]]        Abnormal            Final result                 Please view results for these tests on the individual orders.    CBC Auto Differential [[id number]]  (Abnormal) Collected: 08/27/23 0617    Specimen: Blood Updated: 08/27/23 0733     WBC 7.32 10*3/mm3      RBC 3.23 10*6/mm3      Hemoglobin 9.5 g/dL      Hematocrit 29.1 %      MCV 90.1 fL      MCH 29.4 pg      MCHC 32.6 g/dL      RDW 12.2 %      RDW-SD 40.1 fl      MPV 9.5 fL      Platelets 334 10*3/mm3      Neutrophil % 55.7 %      Lymphocyte % 24.6 %      Monocyte % 8.2 %      Eosinophil % 10.4 %      Basophil % 0.7 %      Immature Grans % 0.4 %      Neutrophils, Absolute 4.08 10*3/mm3      Lymphocytes, Absolute 1.80 10*3/mm3      Monocytes, Absolute 0.60 10*3/mm3      Eosinophils, Absolute 0.76 10*3/mm3      Basophils, Absolute 0.05 10*3/mm3      Immature Grans, Absolute 0.03 10*3/mm3      nRBC 0.0 /100 WBC     Blood Culture - Blood, Arm, Left [[id number]]  (Normal) Collected: 08/24/23 1452    Specimen: Blood from Arm, Left Updated: 08/26/23 1531     Blood Culture No growth at 2 days    Blood Culture - Blood, Arm, Left [[id number]]  (Normal) Collected: 08/24/23 1456    Specimen: Blood from Arm, Left Updated: 08/26/23 1531     Blood Culture No growth at 2 days             Assessment & Plan       Pneumonia    Paratracheal  gas collection    Asthma    Neck pain    Infection due to parainfluenza virus 4       Assessment & Plan    Respiratory viral panel positive for parainfluenza.  CT of the neck was performed and demonstrated moderate size right paraesophageal soft tissue mass of the cervical thoracic junction.  This measured approximately 2.9 cm x 2.7 cm x 3.3 cm.  There was a small amount of air.  There was mass effect on the right lower aspect of the esophagus.  Esophagram demonstrated no evidence of leak or extravasation.  Paraesophageal mass: Patient underwent exploratory procedure in April for a similar issue.    Patient reported that her symptoms started during flight after forceful Valsalva to unclog her ears.  This could be related to similar perforation from tracheal cyst or Montez's dehiscence.  I will plan to do bronchoscopy and EGD tomorrow afternoon.   Leukocytosis improving.  Due to concern for mediastinitis going to keep on IV antibiotics for now.  She can have mechanical soft diet for now. NPO tomorrow morning.  Rest of the care per primary team.    Hemanth La MD  [facility]  08/27/23  11:07 EDT    Greater than 35 minutes was spent reviewing the patient's chart, radiographic imaging, labs, provider notes, assessing the patient and developing a plan of care.  This was discussed with the patient and RN.

## 2023-08-28 ENCOUNTER — ANESTHESIA (OUTPATIENT)
Dept: GASTROENTEROLOGY | Facility: HOSPITAL | Age: 38
DRG: 178 | End: 2023-08-28
Payer: COMMERCIAL

## 2023-08-28 ENCOUNTER — ANESTHESIA EVENT (OUTPATIENT)
Dept: GASTROENTEROLOGY | Facility: HOSPITAL | Age: 38
DRG: 178 | End: 2023-08-28
Payer: COMMERCIAL

## 2023-08-28 LAB
ALBUMIN SERPL-MCNC: 2.9 G/DL (ref 3.5–5.2)
ALBUMIN/GLOB SERPL: 1 G/DL
ALP SERPL-CCNC: 148 U/L (ref 39–117)
ALT SERPL W P-5'-P-CCNC: 33 U/L (ref 1–33)
ANION GAP SERPL CALCULATED.3IONS-SCNC: 7.6 MMOL/L (ref 5–15)
AST SERPL-CCNC: 25 U/L (ref 1–32)
BASOPHILS # BLD AUTO: 0.05 10*3/MM3 (ref 0–0.2)
BASOPHILS NFR BLD AUTO: 0.8 % (ref 0–1.5)
BILIRUB SERPL-MCNC: 0.3 MG/DL (ref 0–1.2)
BUN SERPL-MCNC: 4 MG/DL (ref 6–20)
BUN/CREAT SERPL: 7.4 (ref 7–25)
CALCIUM SPEC-SCNC: 8.4 MG/DL (ref 8.6–10.5)
CHLORIDE SERPL-SCNC: 105 MMOL/L (ref 98–107)
CO2 SERPL-SCNC: 25.4 MMOL/L (ref 22–29)
CREAT SERPL-MCNC: 0.54 MG/DL (ref 0.57–1)
DEPRECATED RDW RBC AUTO: 40.6 FL (ref 37–54)
EGFRCR SERPLBLD CKD-EPI 2021: 123.3 ML/MIN/1.73
EOSINOPHIL # BLD AUTO: 0.8 10*3/MM3 (ref 0–0.4)
EOSINOPHIL NFR BLD AUTO: 12.2 % (ref 0.3–6.2)
ERYTHROCYTE [DISTWIDTH] IN BLOOD BY AUTOMATED COUNT: 12.2 % (ref 12.3–15.4)
GLOBULIN UR ELPH-MCNC: 2.8 GM/DL
GLUCOSE SERPL-MCNC: 89 MG/DL (ref 65–99)
HCT VFR BLD AUTO: 29.2 % (ref 34–46.6)
HGB BLD-MCNC: 9.6 G/DL (ref 12–15.9)
IMM GRANULOCYTES # BLD AUTO: 0.04 10*3/MM3 (ref 0–0.05)
IMM GRANULOCYTES NFR BLD AUTO: 0.6 % (ref 0–0.5)
LYMPHOCYTES # BLD AUTO: 2.34 10*3/MM3 (ref 0.7–3.1)
LYMPHOCYTES NFR BLD AUTO: 35.8 % (ref 19.6–45.3)
MCH RBC QN AUTO: 29.7 PG (ref 26.6–33)
MCHC RBC AUTO-ENTMCNC: 32.9 G/DL (ref 31.5–35.7)
MCV RBC AUTO: 90.4 FL (ref 79–97)
MONOCYTES # BLD AUTO: 0.56 10*3/MM3 (ref 0.1–0.9)
MONOCYTES NFR BLD AUTO: 8.6 % (ref 5–12)
NEUTROPHILS NFR BLD AUTO: 2.75 10*3/MM3 (ref 1.7–7)
NEUTROPHILS NFR BLD AUTO: 42 % (ref 42.7–76)
NRBC BLD AUTO-RTO: 0 /100 WBC (ref 0–0.2)
PLATELET # BLD AUTO: 364 10*3/MM3 (ref 140–450)
PMV BLD AUTO: 9.6 FL (ref 6–12)
POTASSIUM SERPL-SCNC: 3.6 MMOL/L (ref 3.5–5.2)
PROT SERPL-MCNC: 5.7 G/DL (ref 6–8.5)
RBC # BLD AUTO: 3.23 10*6/MM3 (ref 3.77–5.28)
SODIUM SERPL-SCNC: 138 MMOL/L (ref 136–145)
WBC NRBC COR # BLD: 6.54 10*3/MM3 (ref 3.4–10.8)

## 2023-08-28 PROCEDURE — 25010000002 PROPOFOL 10 MG/ML EMULSION: Performed by: REGISTERED NURSE

## 2023-08-28 PROCEDURE — 25010000002 PIPERACILLIN SOD-TAZOBACTAM PER 1 G: Performed by: INTERNAL MEDICINE

## 2023-08-28 PROCEDURE — 0DJ08ZZ INSPECTION OF UPPER INTESTINAL TRACT, VIA NATURAL OR ARTIFICIAL OPENING ENDOSCOPIC: ICD-10-PCS | Performed by: SURGERY

## 2023-08-28 PROCEDURE — 80053 COMPREHEN METABOLIC PANEL: CPT | Performed by: INTERNAL MEDICINE

## 2023-08-28 PROCEDURE — 43235 EGD DIAGNOSTIC BRUSH WASH: CPT | Performed by: SURGERY

## 2023-08-28 PROCEDURE — 94664 DEMO&/EVAL PT USE INHALER: CPT

## 2023-08-28 PROCEDURE — 94799 UNLISTED PULMONARY SVC/PX: CPT

## 2023-08-28 PROCEDURE — 0BJ08ZZ INSPECTION OF TRACHEOBRONCHIAL TREE, VIA NATURAL OR ARTIFICIAL OPENING ENDOSCOPIC: ICD-10-PCS | Performed by: SURGERY

## 2023-08-28 PROCEDURE — 85025 COMPLETE CBC W/AUTO DIFF WBC: CPT | Performed by: INTERNAL MEDICINE

## 2023-08-28 PROCEDURE — G0378 HOSPITAL OBSERVATION PER HR: HCPCS

## 2023-08-28 PROCEDURE — 99232 SBSQ HOSP IP/OBS MODERATE 35: CPT | Performed by: INTERNAL MEDICINE

## 2023-08-28 RX ORDER — PROPOFOL 10 MG/ML
VIAL (ML) INTRAVENOUS CONTINUOUS PRN
Status: DISCONTINUED | OUTPATIENT
Start: 2023-08-28 | End: 2023-08-28 | Stop reason: SURG

## 2023-08-28 RX ORDER — LIDOCAINE HYDROCHLORIDE 10 MG/ML
INJECTION, SOLUTION EPIDURAL; INFILTRATION; INTRACAUDAL; PERINEURAL AS NEEDED
Status: DISCONTINUED | OUTPATIENT
Start: 2023-08-28 | End: 2023-08-28 | Stop reason: HOSPADM

## 2023-08-28 RX ORDER — PROPOFOL 10 MG/ML
VIAL (ML) INTRAVENOUS AS NEEDED
Status: DISCONTINUED | OUTPATIENT
Start: 2023-08-28 | End: 2023-08-28 | Stop reason: SURG

## 2023-08-28 RX ORDER — SODIUM CHLORIDE 0.9 % (FLUSH) 0.9 %
10 SYRINGE (ML) INJECTION AS NEEDED
Status: DISCONTINUED | OUTPATIENT
Start: 2023-08-28 | End: 2023-08-28 | Stop reason: HOSPADM

## 2023-08-28 RX ORDER — SODIUM CHLORIDE 9 MG/ML
1000 INJECTION, SOLUTION INTRAVENOUS CONTINUOUS
Status: DISCONTINUED | OUTPATIENT
Start: 2023-08-28 | End: 2023-08-29

## 2023-08-28 RX ORDER — LIDOCAINE HYDROCHLORIDE 20 MG/ML
INJECTION, SOLUTION INFILTRATION; PERINEURAL AS NEEDED
Status: DISCONTINUED | OUTPATIENT
Start: 2023-08-28 | End: 2023-08-28 | Stop reason: SURG

## 2023-08-28 RX ADMIN — PROPOFOL 100 MCG/KG/MIN: 10 INJECTION, EMULSION INTRAVENOUS at 14:48

## 2023-08-28 RX ADMIN — PIPERACILLIN SODIUM AND TAZOBACTAM SODIUM 3.38 G: 3; .375 INJECTION, SOLUTION INTRAVENOUS at 08:15

## 2023-08-28 RX ADMIN — Medication 10 ML: at 00:30

## 2023-08-28 RX ADMIN — PIPERACILLIN SODIUM AND TAZOBACTAM SODIUM 3.38 G: 3; .375 INJECTION, SOLUTION INTRAVENOUS at 00:30

## 2023-08-28 RX ADMIN — IPRATROPIUM BROMIDE AND ALBUTEROL SULFATE 3 ML: 2.5; .5 SOLUTION RESPIRATORY (INHALATION) at 19:56

## 2023-08-28 RX ADMIN — IPRATROPIUM BROMIDE AND ALBUTEROL SULFATE 3 ML: 2.5; .5 SOLUTION RESPIRATORY (INHALATION) at 10:59

## 2023-08-28 RX ADMIN — Medication 50 MG: at 14:48

## 2023-08-28 RX ADMIN — PIPERACILLIN SODIUM AND TAZOBACTAM SODIUM 3.38 G: 3; .375 INJECTION, SOLUTION INTRAVENOUS at 17:29

## 2023-08-28 RX ADMIN — Medication 10 ML: at 08:15

## 2023-08-28 RX ADMIN — IPRATROPIUM BROMIDE AND ALBUTEROL SULFATE 3 ML: 2.5; .5 SOLUTION RESPIRATORY (INHALATION) at 07:14

## 2023-08-28 RX ADMIN — LIDOCAINE HYDROCHLORIDE 100 MG: 20 INJECTION, SOLUTION INFILTRATION; PERINEURAL at 14:48

## 2023-08-28 NOTE — ANESTHESIA POSTPROCEDURE EVALUATION
"Patient: Wilda Mayo    Procedure Summary       Date: 08/28/23 Room / Location:  [facility]    Anesthesia Start: 1442 Anesthesia Stop:     Procedures:       ESOPHAGOGASTRODUODENOSCOPY (Esophagus)      BRONCHOSCOPY (Bronchus) Diagnosis:       Neck pain      (Neck pain [M54.2])    Surgeons: Hemanth La MD Provider: Scot Olvera MD    Anesthesia Type: general ASA Status: 3            Anesthesia Type: general    Vitals  No vitals data found for the desired time range.          Post Anesthesia Care and Evaluation    Patient location during evaluation: bedside  Patient participation: complete - patient participated  Level of consciousness: awake and alert  Pain management: adequate    Airway patency: patent  Anesthetic complications: No anesthetic complications    Cardiovascular status: acceptable  Respiratory status: acceptable  Hydration status: acceptable    Comments: /74 (BP Location: Left arm, Patient Position: Lying)   Pulse 76   Temp 36.8 øC (98.3 øF) (Oral)   Resp 12   Ht 175.3 cm (69\")   Wt 65.3 kg (143 lb 15.4 oz)   LMP 08/17/2023 (Approximate)   SpO2 99%   BMI 21.26 kg/mý     "

## 2023-08-28 NOTE — ANESTHESIA PREPROCEDURE EVALUATION
Anesthesia Evaluation     Patient summary reviewed and Nursing notes reviewed                Airway   Mallampati: II  TM distance: >3 FB  Neck ROM: full  Dental      Pulmonary    (+) pneumonia , asthma,  Cardiovascular - negative cardio ROS    Rhythm: regular  Rate: normal        Neuro/Psych- negative ROS  GI/Hepatic/Renal/Endo - negative ROS     Musculoskeletal     (+) neck pain  Abdominal    Substance History - negative use     OB/GYN negative ob/gyn ROS         Other                      Anesthesia Plan    ASA 3     general     intravenous induction     Anesthetic plan, risks, benefits, and alternatives have been provided, discussed and informed consent has been obtained with: patient.    Plan discussed with CRNA.    CODE STATUS:    Code Status (Patient has no pulse and is not breathing): CPR (Attempt to Resuscitate)  Medical Interventions (Patient has pulse or is breathing): Full Support

## 2023-08-28 NOTE — PROGRESS NOTES
ID NOTE    CC: f/u fever    Subj: No fever. Dry cough a little more bothersome today. Going for procedure later this AM. Tolerating Zosyn w/o rash.     Medications:    Current Facility-Administered Medications:     albuterol (PROVENTIL) nebulizer solution 0.083% 2.5 mg/3mL, 2.5 mg, Nebulization, Q6H PRN, Juan Estrada MD    diazePAM (VALIUM) injection 2.5 mg, 2.5 mg, Intravenous, Q6H PRN, Beverly Araya APRN    ipratropium-albuterol (DUO-NEB) nebulizer solution 3 mL, 3 mL, Nebulization, 4x Daily - RT, Beverly Araya APRN, 3 mL at 08/28/23 0714    morphine injection 2 mg, 2 mg, Intravenous, Q4H PRN, Hemanth La MD, 2 mg at 08/24/23 2233    ondansetron (ZOFRAN) injection 4 mg, 4 mg, Intravenous, Q6H PRN, Juan Estrada MD, 4 mg at 08/24/23 2234    piperacillin-tazobactam (ZOSYN) 3.375 g in iso-osmotic dextrose 50 ml (premix), 3.375 g, Intravenous, Q8H, Mora Friedman MD, Last Rate: 0 mL/hr at 08/28/23 0430, 3.375 g at 08/28/23 0815    sodium chloride 0.9 % flush 10 mL, 10 mL, Intravenous, Q12H, Juan Estrada MD, 10 mL at 08/28/23 0815    sodium chloride 0.9 % flush 10 mL, 10 mL, Intravenous, PRN, Juan Estrada MD    sodium chloride 0.9 % infusion 40 mL, 40 mL, Intravenous, PRSean RODRIGUEZ Abhishek, MD    sodium chloride 0.9 % infusion, 50 mL/hr, Intravenous, ContinuousBessie Nabeel, MD, Last Rate: 50 mL/hr at 08/26/23 0821, 50 mL/hr at 08/26/23 0821      Objective   Vital Signs   Temp:  [97.7 °F (36.5 °C)-98.4 °F (36.9 °C)] 97.7 °F (36.5 °C)  Heart Rate:  [] 90  Resp:  [16-18] 16  BP: (103-107)/(58-73) 107/62    Physical Exam:   General: awake, alert, NAD, very nice  Eyes: no scleral icterus  Neck: healed incision  Cardiovascular: NR  Respiratory: BLL rales improved; dry cough   Skin: No rashes  Neurological: Alert and oriented x 3  Psychiatric: Normal mood and affect     Labs:   CBC, BMP, and blood cultures reviewed today  Lab Results   Component Value Date    WBC 6.54 08/28/2023    HGB 9.6  (L) 08/28/2023    HCT 29.2 (L) 08/28/2023    MCV 90.4 08/28/2023     08/28/2023     Lab Results   Component Value Date    GLUCOSE 89 08/28/2023    CALCIUM 8.4 (L) 08/28/2023     08/28/2023    K 3.6 08/28/2023    CO2 25.4 08/28/2023     08/28/2023    BUN 4 (L) 08/28/2023    CREATININE 0.54 (L) 08/28/2023    EGFR 123.3 08/28/2023    BCR 7.4 08/28/2023    ANIONGAP 7.6 08/28/2023     HIV negative  HCG negative  Procal 0.08    Microbiology:  8/24 COVID: negative  8/24 BCx: NGTD  8/25 RPP + paraflu 4    Prior Radiology:  CT Neck:   1. Moderate size right periesophageal soft tissue mass at the cervicothoracic junction as described. This mass contains a small amount of air and is unchanged from yesterday's study. This could be an inflammatory or neoplastic mass.   2. No esophageal perforation was seen on yesterday's esophagram.   3. No pathologic lymphadenopathy or other neck masses are seen.   4. Relatively extensive sinusitis.     ASSESSMENT/PLAN:  Right paraesophageal fluid collection with mass effect  Bilateral lower lobe infiltrates  Fever in adult - resolved  Diarrhea  Parainfluenza 4 infection with sinusitis    She is afebrile and her WBC has normalized. For parainfluenza 4, there is no specific treatment as this is a   self-limited illness. It is already significantly improved.     Re: paraesophageal fluid collection, the plan is for bronch and EGD today. I will continue empiric Zosyn with duration TBD. I'll follow-up the procedural findings before making a final plan.     ID will follow.

## 2023-08-28 NOTE — PROGRESS NOTES
"                                              LOS: 0 days   Patient Care Team:  Provider, No Known as PCP - General    Chief Complaint:  F/up parainfluenza infection, abnormal CT chest    Subjective   Interval History  I reviewed the admission note, progress notes, PMH, PSH, Family hx, social history, imagings and prior records on this admission, summarized the finding in my note and formulated a transition of care plan.      She has mild cough with sputum production.  No shortness of breath at rest.  On RA.    REVIEW OF SYSTEMS:   CARDIOVASCULAR: No chest pain, chest pressure or chest discomfort. No palpitations or edema.   RESPIRATORY: No shortness of breath but cough and sinus congestion  GASTROINTESTINAL: No anorexia, nausea, vomiting or diarrhea. No abdominal pain.  CONSTITUTIONAL: No fever or chills.     Ventilator/Non-Invasive Ventilation Settings (From admission, onward)      None                  Physical Exam:     Vital Signs  Temp:  [97.7 °F (36.5 °C)-98.4 °F (36.9 °C)] 97.7 °F (36.5 °C)  Heart Rate:  [] 90  Resp:  [16-18] 16  BP: (103-107)/(58-73) 107/62    Intake/Output Summary (Last 24 hours) at 8/28/2023 0827  Last data filed at 8/27/2023 1340  Gross per 24 hour   Intake 210 ml   Output --   Net 210 ml     Flowsheet Rows      Flowsheet Row First Filed Value   Admission Height 175.3 cm (69\") Documented at 08/24/2023 0927   Admission Weight 68.5 kg (151 lb) Documented at 08/24/2023 0927            PPE used per hospital policy    General Appearance:   Alert, cooperative, in no acute distress   ENMT:  Mallampati score 2, moist mucous membrane   Eyes:  Pupils equal and reactive to light. EOMI   Neck:    Trachea midline. No thyromegaly.   Lungs:    Clear to auscultation,respirations regular, even and nonlabored    Heart:   Regular rhythm and normal rate, normal S1 and S2, no         murmur   Skin:   No rash or ecchymosis   Abdomen:     Soft. No tenderness. No HSM.   Neuro/psych:  Conscious, alert, " oriented x3. Strength 5/5 in upper and lower  ext.  Appropriate mood and affect   Extremities:  No cyanosis, clubbing or edema.  Warm extremities and well-perfused          Results Review:        Results from last 7 days   Lab Units 08/28/23  0545 08/27/23  0617 08/26/23  0520   SODIUM mmol/L 138 139 139   POTASSIUM mmol/L 3.6 3.7 3.7   CHLORIDE mmol/L 105 106 106   CO2 mmol/L 25.4 25.0 23.0   BUN mg/dL 4* 5* 8   CREATININE mg/dL 0.54* 0.56* 0.54*   GLUCOSE mg/dL 89 95 77   CALCIUM mg/dL 8.4* 8.2* 8.1*     Results from last 7 days   Lab Units 08/24/23  1229 08/24/23  1009   HSTROP T ng/L <6 <6     Results from last 7 days   Lab Units 08/28/23  0545 08/27/23  0617 08/26/23  0520   WBC 10*3/mm3 6.54 7.32 11.05*   HEMOGLOBIN g/dL 9.6* 9.5* 10.3*   HEMATOCRIT % 29.2* 29.1* 30.9*   PLATELETS 10*3/mm3 364 334 333                                   I reviewed the patient's new clinical results.        Medication Review:   ipratropium-albuterol, 3 mL, Nebulization, 4x Daily - RT  piperacillin-tazobactam, 3.375 g, Intravenous, Q8H  sodium chloride, 10 mL, Intravenous, Q12H        sodium chloride, 50 mL/hr, Last Rate: 50 mL/hr (08/26/23 0821)        Diagnostic imaging:  I personally and independently reviewed the following images:  CT chest 8/24/2023: Upper paraesophageal mass (not included below).  Small pulmonary infiltrates bilaterally especially at the bases as shown below.      Assessment     Aspiration pneumonia +/- pneumonitis  Parainfluenza infection and sinusitis  Right paraesophageal mass fluid collection  Leukocytosis  History of mediastinitis  History of asthma    All problems new to me    Plan       DuoNeb 4 times a day  Incentive spirometry  Antibiotics with Zosyn for pneumonia.  ID managing.  Noted plan for bronchoscopy and EGD per thoracic surgery      Reji Pinzon MD  08/28/23  08:27 EDT            This note was dictated utilizing Dragon dictation

## 2023-08-28 NOTE — PROGRESS NOTES
Name: Wilda Mayo ADMIT: 2023   : 1985  PCP: Provider, No Known    MRN: [id number] LOS: 0 days   AGE/SEX: 37 y.o. female  ROOM: [facility]     Subjective   Subjective   Patient is seen at bedside, no new complaints.       Objective   Objective   Vital Signs  Temp:  [97.7 °F (36.5 °C)-98.3 °F (36.8 °C)] 98.3 °F (36.8 °C)  Heart Rate:  [] 91  Resp:  [12-18] 16  BP: ()/(62-74) 110/67  SpO2:  [96 %-100 %] 99 %  on   ;   Device (Oxygen Therapy): room air  Body mass index is 21.26 kg/m².  Physical Exam  General, awake and alert.  Head and ENT, normocephalic and atraumatic.  Neck scar noted  Lungs, symmetric expansion, equal air entry bilaterally.  Heart, regular rate and rhythm.  Abdomen, soft and nontender.  Extremities, no clubbing or cyanosis.  Neuro, no focal deficits.  Skin: Warm and no rash.  Psych, normal mood and affect.  Musculoskeletal, joint examination is grossly normal.          Copied text material from yesterday's note has been reviewed for appropriate changes and remains accurate as of 23.         Results Review     I reviewed the patient's new clinical results.  Results from last 7 days   Lab Units 23  0545 23  0617 23  0520 23  0724   WBC 10*3/mm3 6.54 7.32 11.05* 17.68*   HEMOGLOBIN g/dL 9.6* 9.5* 10.3* 11.3*   PLATELETS 10*3/mm3 364 334 333 346     Results from last 7 days   Lab Units 23  0545 23  0617 23  0520 23  0724   SODIUM mmol/L 138 139 139 142   POTASSIUM mmol/L 3.6 3.7 3.7 3.6   CHLORIDE mmol/L 105 106 106 108*   CO2 mmol/L 25.4 25.0 23.0 20.3*   BUN mg/dL 4* 5* 8 8   CREATININE mg/dL 0.54* 0.56* 0.54* 0.64   GLUCOSE mg/dL 89 95 77 84   EGFR mL/min/1.73 123.3 122.2 123.3 118.4     Results from last 7 days   Lab Units 23  0545 23  0520 23  0724   ALBUMIN g/dL 2.9* 3.0* 3.2*   BILIRUBIN mg/dL 0.3 0.5 0.4   ALK PHOS U/L 148* 129* 78   AST (SGOT) U/L 25 19 12   ALT (SGPT) U/L 33 26 28      Results from last 7 days   Lab Units 08/28/23  0545 08/27/23  0617 08/26/23  0520 08/25/23  0724   CALCIUM mg/dL 8.4* 8.2* 8.1* 8.4*   ALBUMIN g/dL 2.9*  --  3.0* 3.2*     Results from last 7 days   Lab Units 08/24/23  1452 08/24/23  1009   PROCALCITONIN ng/mL  --  0.08   LACTATE mmol/L 1.0  --      No results found for: HGBA1C, POCGLU    No radiology results for the last day    I have personally reviewed all medications:  Scheduled Medications  ipratropium-albuterol, 3 mL, Nebulization, 4x Daily - RT  piperacillin-tazobactam, 3.375 g, Intravenous, Q8H  sodium chloride, 10 mL, Intravenous, Q12H    Infusions  sodium chloride, 1,000 mL, Last Rate: Stopped (08/28/23 1527)  sodium chloride, 50 mL/hr, Last Rate: Stopped (08/28/23 1353)    Diet  NPO Diet NPO Type: Strict NPO  Diet: Gastrointestinal Diets; Fiber-Restricted; Texture: Regular Texture (IDDSI 7); Fluid Consistency: Thin (IDDSI 0)    I have personally reviewed:  [x]  Laboratory   [x]  Microbiology   [x]  Radiology   [x]  EKG/Telemetry  [x]  Cardiology/Vascular   []  Pathology    []  Records       Assessment/Plan     Active Hospital Problems    Diagnosis  POA    **Pneumonia [J18.9]  Yes    Infection due to parainfluenza virus 4 [B34.8]  Yes    Neck pain [M54.2]  Yes    Asthma [J45.909]  Yes    Paratracheal gas collection [R93.89]  Yes      Resolved Hospital Problems   No resolved problems to display.       37 y.o. female admitted with Pneumonia.    Assessment and plan  1.  Complex collection/mass containing air in the right superior paraesophageal region.  There is mass effect on trachea and esophagus on the left.  Thoracic surgery consulted for further input and management. RPP positive for parainfluenza 4 and this probably accounts for her most recent symptoms. She has a sick child at home so that is the likely contact. This is a self-limited illness.  She underwent bronchoscopy and EGD.     2.  Pneumonia, pulmonary and infectious disease on board.      3.   Paraesophageal mass, keep patient n.p.o. with no sips, ice chips or oral meds.  Follow consultant management recommendations.     4.  History of asthma, chronic condition, does not appear to be in exacerbation.     5.  CODE STATUS is full code.  Further plans based on hospital course.            Juan Estrada MD  [facility]  08/28/23  18:53 EDT

## 2023-08-28 NOTE — PLAN OF CARE
Goal Outcome Evaluation:      VSS, IV abx and fluids continued, no complaints of pain, room air, will start NPO at 0700.

## 2023-08-29 ENCOUNTER — APPOINTMENT (OUTPATIENT)
Dept: CT IMAGING | Facility: HOSPITAL | Age: 38
DRG: 178 | End: 2023-08-29
Payer: COMMERCIAL

## 2023-08-29 LAB
ALBUMIN SERPL-MCNC: 3.1 G/DL (ref 3.5–5.2)
ALBUMIN/GLOB SERPL: 1.1 G/DL
ALP SERPL-CCNC: 171 U/L (ref 39–117)
ALT SERPL W P-5'-P-CCNC: 34 U/L (ref 1–33)
ANION GAP SERPL CALCULATED.3IONS-SCNC: 7 MMOL/L (ref 5–15)
AST SERPL-CCNC: 21 U/L (ref 1–32)
BACTERIA SPEC AEROBE CULT: NORMAL
BACTERIA SPEC AEROBE CULT: NORMAL
BASOPHILS # BLD AUTO: 0.05 10*3/MM3 (ref 0–0.2)
BASOPHILS NFR BLD AUTO: 0.7 % (ref 0–1.5)
BILIRUB SERPL-MCNC: 0.2 MG/DL (ref 0–1.2)
BUN SERPL-MCNC: 6 MG/DL (ref 6–20)
BUN/CREAT SERPL: 9.2 (ref 7–25)
CALCIUM SPEC-SCNC: 8.9 MG/DL (ref 8.6–10.5)
CHLORIDE SERPL-SCNC: 108 MMOL/L (ref 98–107)
CO2 SERPL-SCNC: 25 MMOL/L (ref 22–29)
CREAT SERPL-MCNC: 0.65 MG/DL (ref 0.57–1)
DEPRECATED RDW RBC AUTO: 39.9 FL (ref 37–54)
EGFRCR SERPLBLD CKD-EPI 2021: 117.9 ML/MIN/1.73
EOSINOPHIL # BLD AUTO: 0.96 10*3/MM3 (ref 0–0.4)
EOSINOPHIL NFR BLD AUTO: 13.5 % (ref 0.3–6.2)
ERYTHROCYTE [DISTWIDTH] IN BLOOD BY AUTOMATED COUNT: 12.2 % (ref 12.3–15.4)
GLOBULIN UR ELPH-MCNC: 2.9 GM/DL
GLUCOSE SERPL-MCNC: 88 MG/DL (ref 65–99)
HCT VFR BLD AUTO: 31.5 % (ref 34–46.6)
HGB BLD-MCNC: 10.5 G/DL (ref 12–15.9)
IMM GRANULOCYTES # BLD AUTO: 0.02 10*3/MM3 (ref 0–0.05)
IMM GRANULOCYTES NFR BLD AUTO: 0.3 % (ref 0–0.5)
LYMPHOCYTES # BLD AUTO: 2.39 10*3/MM3 (ref 0.7–3.1)
LYMPHOCYTES NFR BLD AUTO: 33.6 % (ref 19.6–45.3)
MCH RBC QN AUTO: 30 PG (ref 26.6–33)
MCHC RBC AUTO-ENTMCNC: 33.3 G/DL (ref 31.5–35.7)
MCV RBC AUTO: 90 FL (ref 79–97)
MONOCYTES # BLD AUTO: 0.6 10*3/MM3 (ref 0.1–0.9)
MONOCYTES NFR BLD AUTO: 8.4 % (ref 5–12)
NEUTROPHILS NFR BLD AUTO: 3.1 10*3/MM3 (ref 1.7–7)
NEUTROPHILS NFR BLD AUTO: 43.5 % (ref 42.7–76)
NRBC BLD AUTO-RTO: 0 /100 WBC (ref 0–0.2)
PLATELET # BLD AUTO: 409 10*3/MM3 (ref 140–450)
PMV BLD AUTO: 9.4 FL (ref 6–12)
POTASSIUM SERPL-SCNC: 3.9 MMOL/L (ref 3.5–5.2)
PROT SERPL-MCNC: 6 G/DL (ref 6–8.5)
RBC # BLD AUTO: 3.5 10*6/MM3 (ref 3.77–5.28)
SODIUM SERPL-SCNC: 140 MMOL/L (ref 136–145)
WBC NRBC COR # BLD: 7.12 10*3/MM3 (ref 3.4–10.8)

## 2023-08-29 PROCEDURE — 99232 SBSQ HOSP IP/OBS MODERATE 35: CPT | Performed by: INTERNAL MEDICINE

## 2023-08-29 PROCEDURE — 25010000002 PIPERACILLIN SOD-TAZOBACTAM PER 1 G: Performed by: INTERNAL MEDICINE

## 2023-08-29 PROCEDURE — 85025 COMPLETE CBC W/AUTO DIFF WBC: CPT | Performed by: INTERNAL MEDICINE

## 2023-08-29 PROCEDURE — 94799 UNLISTED PULMONARY SVC/PX: CPT

## 2023-08-29 PROCEDURE — 25510000001 IOPAMIDOL 61 % SOLUTION: Performed by: INTERNAL MEDICINE

## 2023-08-29 PROCEDURE — 70491 CT SOFT TISSUE NECK W/DYE: CPT

## 2023-08-29 PROCEDURE — 94664 DEMO&/EVAL PT USE INHALER: CPT

## 2023-08-29 PROCEDURE — G0378 HOSPITAL OBSERVATION PER HR: HCPCS

## 2023-08-29 PROCEDURE — 94761 N-INVAS EAR/PLS OXIMETRY MLT: CPT

## 2023-08-29 PROCEDURE — 80053 COMPREHEN METABOLIC PANEL: CPT | Performed by: INTERNAL MEDICINE

## 2023-08-29 PROCEDURE — 99232 SBSQ HOSP IP/OBS MODERATE 35: CPT

## 2023-08-29 RX ORDER — AMOXICILLIN AND CLAVULANATE POTASSIUM 875; 125 MG/1; MG/1
1 TABLET, FILM COATED ORAL EVERY 12 HOURS SCHEDULED
Status: DISCONTINUED | OUTPATIENT
Start: 2023-08-30 | End: 2023-08-30 | Stop reason: HOSPADM

## 2023-08-29 RX ORDER — IPRATROPIUM BROMIDE AND ALBUTEROL SULFATE 2.5; .5 MG/3ML; MG/3ML
3 SOLUTION RESPIRATORY (INHALATION) EVERY 6 HOURS PRN
Status: DISCONTINUED | OUTPATIENT
Start: 2023-08-29 | End: 2023-08-30 | Stop reason: HOSPADM

## 2023-08-29 RX ADMIN — PIPERACILLIN SODIUM AND TAZOBACTAM SODIUM 3.38 G: 3; .375 INJECTION, SOLUTION INTRAVENOUS at 16:02

## 2023-08-29 RX ADMIN — Medication 10 ML: at 21:35

## 2023-08-29 RX ADMIN — Medication 10 ML: at 08:35

## 2023-08-29 RX ADMIN — PIPERACILLIN SODIUM AND TAZOBACTAM SODIUM 3.38 G: 3; .375 INJECTION, SOLUTION INTRAVENOUS at 08:34

## 2023-08-29 RX ADMIN — SODIUM CHLORIDE 50 ML/HR: 9 INJECTION, SOLUTION INTRAVENOUS at 00:21

## 2023-08-29 RX ADMIN — PIPERACILLIN SODIUM AND TAZOBACTAM SODIUM 3.38 G: 3; .375 INJECTION, SOLUTION INTRAVENOUS at 00:21

## 2023-08-29 RX ADMIN — IPRATROPIUM BROMIDE AND ALBUTEROL SULFATE 3 ML: 2.5; .5 SOLUTION RESPIRATORY (INHALATION) at 07:09

## 2023-08-29 RX ADMIN — Medication 10 ML: at 00:22

## 2023-08-29 RX ADMIN — IOPAMIDOL 85 ML: 612 INJECTION, SOLUTION INTRAVENOUS at 08:17

## 2023-08-29 RX ADMIN — IPRATROPIUM BROMIDE AND ALBUTEROL SULFATE 3 ML: 2.5; .5 SOLUTION RESPIRATORY (INHALATION) at 10:38

## 2023-08-29 NOTE — CASE MANAGEMENT/SOCIAL WORK
Continued Stay Note  [facility]     Patient Name: Wilda Mayo  MRN: [id number]  Today's Date: 8/29/2023    Admit Date: 8/24/2023    Plan: Return home with family   Discharge Plan       Row Name 08/29/23 1446       Plan    Plan Return home with family    Patient/Family in Agreement with Plan yes    Plan Comments Spoke with patient at bedside.  Plan remains to return home at [place].  CCP continues to follow.  Neil POTTER                             Expected Discharge Date and Time       Expected Discharge Date Expected Discharge Time    Aug 29, 2023               Becky S. [place], RN

## 2023-08-29 NOTE — PROGRESS NOTES
"                                              LOS: 0 days   Patient Care Team:  Provider, No Known as PCP - General    Chief Complaint:  F/up parainfluenza infection, abnormal CT chest    Subjective   Interval History  Noted normal bronch and EGD done yesterday on 8/28.  Patient continues to have some sinus congestion and mild cough but no significant phlegm.    REVIEW OF SYSTEMS:   CARDIOVASCULAR: No chest pain, chest pressure or chest discomfort. No palpitations or edema.   RESPIRATORY: No shortness of breath but cough and sinus congestion  GASTROINTESTINAL: No anorexia, nausea, vomiting or diarrhea. No abdominal pain.  CONSTITUTIONAL: No fever or chills.     Ventilator/Non-Invasive Ventilation Settings (From admission, onward)      None                  Physical Exam:     Vital Signs  Temp:  [97.7 °F (36.5 °C)-98.2 °F (36.8 °C)] 98.2 °F (36.8 °C)  Heart Rate:  [] 87  Resp:  [12-20] 20  BP: ()/(57-74) 102/57    Intake/Output Summary (Last 24 hours) at 8/29/2023 1402  Last data filed at 8/29/2023 0834  Gross per 24 hour   Intake 800 ml   Output --   Net 800 ml       Flowsheet Rows      Flowsheet Row First Filed Value   Admission Height 175.3 cm (69\") Documented at 08/24/2023 0927   Admission Weight 68.5 kg (151 lb) Documented at 08/24/2023 0927            PPE used per hospital policy    General Appearance:   Alert, cooperative, in no acute distress   ENMT:  Mallampati score 2, moist mucous membrane   Eyes:  Pupils equal and reactive to light. EOMI   Neck:    Trachea midline. No thyromegaly.   Lungs:   Good and equal air entry bilaterally.  No audible crackles or wheezing.    Heart:   Regular rhythm and normal rate, normal S1 and S2, no         murmur   Skin:   No rash or ecchymosis   Abdomen:     Soft. No tenderness. No HSM.   Neuro/psych:  Conscious, alert, oriented x3. Strength 5/5 in upper and lower  ext.  Appropriate mood and affect   Extremities:  No cyanosis, clubbing or edema.  Warm extremities " and well-perfused          Results Review:        Results from last 7 days   Lab Units 08/29/23  0629 08/28/23  0545 08/27/23  0617   SODIUM mmol/L 140 138 139   POTASSIUM mmol/L 3.9 3.6 3.7   CHLORIDE mmol/L 108* 105 106   CO2 mmol/L 25.0 25.4 25.0   BUN mg/dL 6 4* 5*   CREATININE mg/dL 0.65 0.54* 0.56*   GLUCOSE mg/dL 88 89 95   CALCIUM mg/dL 8.9 8.4* 8.2*       Results from last 7 days   Lab Units 08/24/23  1229 08/24/23  1009   HSTROP T ng/L <6 <6       Results from last 7 days   Lab Units 08/29/23  0629 08/28/23  0545 08/27/23  0617   WBC 10*3/mm3 7.12 6.54 7.32   HEMOGLOBIN g/dL 10.5* 9.6* 9.5*   HEMATOCRIT % 31.5* 29.2* 29.1*   PLATELETS 10*3/mm3 409 364 334                                     I reviewed the patient's new clinical results.        Medication Review:   ipratropium-albuterol, 3 mL, Nebulization, 4x Daily - RT  piperacillin-tazobactam, 3.375 g, Intravenous, Q8H  sodium chloride, 10 mL, Intravenous, Q12H        sodium chloride, 50 mL/hr, Last Rate: 50 mL/hr (08/29/23 1241)        Diagnostic imaging:  I personally and independently reviewed the following images:  CT chest 8/24/2023: Upper paraesophageal mass (not included below).  Small pulmonary infiltrates bilaterally especially at the bases as shown below.      Assessment     Aspiration pneumonia +/- pneumonitis  Parainfluenza infection and sinusitis  Right paraesophageal mass fluid collection  Leukocytosis  History of mediastinitis  History of asthma        Plan       DuoNeb 4 times a day  Incentive spirometry  Change DuoNeb to as needed only instead of scheduled  Antibiotics with Zosyn for pneumonia/paraesophageal infection.  ID managing.  Noted plan for follow-up CT neck by thoracic surgery as outpatient      Reji Pinzon MD  08/29/23  14:02 EDT            This note was dictated utilizing [employer] dictation

## 2023-08-29 NOTE — PROGRESS NOTES
"    Chief Complaint: Right paraesophageal phlegmon, mediastinitis.  S/P: EGD, bronchoscopy  POD #: 1    Subjective:  Symptoms:  Stable.  No shortness of breath, cough or chest pain.    Diet:  Adequate intake.  No nausea or vomiting.    Activity level: Normal.    Pain:  She reports no pain.      Vital Signs:  Temp:  [97.7 °F (36.5 °C)-98.2 °F (36.8 °C)] 98.2 °F (36.8 °C)  Heart Rate:  [] 87  Resp:  [12-20] 20  BP: ()/(57-74) 102/57    Intake & Output (last day)         08/28 0701  08/29 0700 08/29 0701  08/30 0700    P.O.  0    I.V. (mL/kg) 800 (12.3)     Total Intake(mL/kg) 800 (12.3) 0 (0)    Net +800 0                  Objective:  General Appearance:  Comfortable, well-appearing and in no acute distress.    Vital signs: (most recent): Blood pressure 102/57, pulse 87, temperature 98.2 °F (36.8 °C), temperature source Oral, resp. rate 20, height 175.3 cm (69\"), weight 65.3 kg (143 lb 15.4 oz), last menstrual period 08/17/2023, SpO2 99 %.    Lungs:  Normal effort and normal respiratory rate.  She is not in respiratory distress.    Heart: Normal rate.  Regular rhythm.    Chest: Symmetric chest wall expansion. No chest wall tenderness.    Abdomen: Abdomen is soft and non-distended.    Neurological: Patient is alert and oriented to person, place and time.    Skin:  Warm and dry.                Results Review:     I reviewed the patient's new clinical results.  I reviewed the patient's new imaging results and agree with the interpretation.  Discussed with patient, nurse, and surgeon.    Imaging Results (Last 24 Hours)       Procedure Component Value Units Date/Time    CT Soft Tissue Neck With Contrast [[id number]] Collected: 08/29/23 0932     Updated: 08/29/23 0933    Narrative:      CT NECK WITH CONTRAST     HISTORY: Abscess.     COMPARISON: Comparison is made to the CT examinations of the neck  performed on 08/25/2023, 05/05/2023 and 04/11/2023.     FINDINGS: There is complete opacification of the frontal " recesses, of  the ethmoid air cells and moderate opacification of the maxillary  sinuses and of the sphenoid sinus. This appears similar to examination  of 08/25/2023.     Small bilateral pleural fluid collections are appreciated which are new  versus 08/25/2023.     A soft tissue mass is present to the right of the esophagus at the level  of T1 and extending inferiorly to the level of T2/T3. This measures  approximately 2.2 cm in the transverse dimension, 1.1 cm in the AP  dimension and approximately 3.2 cm in the craniocaudal dimension. A  small collection of air is present medially measuring 2 mm in size. The  soft tissue mass has decreased in size as compared to 08/25/2023 and the  collection of air has decreased in size. The soft tissue mass measures  approximately 2.8 cm in transverse dimension, 2.7 cm in the AP dimension  and approximately 3.3 cm in the craniocaudal dimension.     There is no evidence of pathologic adenopathy.     The parotid, submandibular and thyroid glands appear unremarkable. The  trachea is deviated slightly to the left of midline and anteriorly which  was the case previously. There is no evidence of airway compromise.       Impression:      1.  There is a persistent soft tissue mass at the level of T1 and T2 and  to the right of the trachea which has decreased slightly in size as  compared to 08/25/2023. The air centrally has decreased in volume. This  may represent a phlegmonous collection/abscess involving a pre-existing  paratracheal cyst. Continued surveillance is recommended.  2.  Small bilateral pleural fluid collections are appreciated which are  new versus 08/25/2023.        Radiation dose reduction techniques were utilized, including automated  exposure control and exposure modulation based on body size.                  Lab Results:     Lab Results (last 24 hours)       Procedure Component Value Units Date/Time    Comprehensive Metabolic Panel [[id number]]  (Abnormal)  Collected: 08/29/23 0629    Specimen: Blood Updated: 08/29/23 0739     Glucose 88 mg/dL      BUN 6 mg/dL      Creatinine 0.65 mg/dL      Sodium 140 mmol/L      Potassium 3.9 mmol/L      Chloride 108 mmol/L      CO2 25.0 mmol/L      Calcium 8.9 mg/dL      Total Protein 6.0 g/dL      Albumin 3.1 g/dL      ALT (SGPT) 34 U/L      AST (SGOT) 21 U/L      Alkaline Phosphatase 171 U/L      Total Bilirubin 0.2 mg/dL      Globulin 2.9 gm/dL      A/G Ratio 1.1 g/dL      BUN/Creatinine Ratio 9.2     Anion Gap 7.0 mmol/L      eGFR 117.9 mL/min/1.73     Narrative:      GFR Normal >60  Chronic Kidney Disease <60  Kidney Failure <15      CBC & Differential [[id number]]  (Abnormal) Collected: 08/29/23 0629    Specimen: Blood Updated: 08/29/23 0719    Narrative:      The following orders were created for panel order CBC & Differential.  Procedure                               Abnormality         Status                     ---------                               -----------         ------                     CBC Auto Differential[[id number]]        Abnormal            Final result                 Please view results for these tests on the individual orders.    CBC Auto Differential [[id number]]  (Abnormal) Collected: 08/29/23 0629    Specimen: Blood Updated: 08/29/23 0719     WBC 7.12 10*3/mm3      RBC 3.50 10*6/mm3      Hemoglobin 10.5 g/dL      Hematocrit 31.5 %      MCV 90.0 fL      MCH 30.0 pg      MCHC 33.3 g/dL      RDW 12.2 %      RDW-SD 39.9 fl      MPV 9.4 fL      Platelets 409 10*3/mm3      Neutrophil % 43.5 %      Lymphocyte % 33.6 %      Monocyte % 8.4 %      Eosinophil % 13.5 %      Basophil % 0.7 %      Immature Grans % 0.3 %      Neutrophils, Absolute 3.10 10*3/mm3      Lymphocytes, Absolute 2.39 10*3/mm3      Monocytes, Absolute 0.60 10*3/mm3      Eosinophils, Absolute 0.96 10*3/mm3      Basophils, Absolute 0.05 10*3/mm3      Immature Grans, Absolute 0.02 10*3/mm3      nRBC 0.0 /100 WBC     Blood Culture - Blood, Arm,  Left [[id number]]  (Normal) Collected: 08/24/23 1452    Specimen: Blood from Arm, Left Updated: 08/28/23 1531     Blood Culture No growth at 4 days    Blood Culture - Blood, Arm, Left [[id number]]  (Normal) Collected: 08/24/23 1456    Specimen: Blood from Arm, Left Updated: 08/28/23 1531     Blood Culture No growth at 4 days             Assessment & Plan       Pneumonia    Paratracheal gas collection    Mediastinitis    Asthma    Neck pain    Infection due to parainfluenza virus 4       Assessment & Plan    S/p bronch and EGD yesterday which demonstrated no evidence of mucosal injury.  Oropharyngeal lymphoid tissue enlargement with cobblestone appearance likely secondary to infectious etiology.  Follow-up contrasted CT soft tissue neck demonstrates a persistent soft tissue mass at the level of T1 and T2 into the right of the trachea which appears decreased in size slightly along with when compared to imaging performed on 8/25/2023.  The air centrally has decreased in volume.  This may be representative of a phlegmonous collection/abscess. WBC 7.1, afebrile.  Recommend continuation of antibiotics per infectious disease recommendation.  Plan for her to follow-up with Dr. La in the office in 1 month with a follow-up contrasted CT of the neck.  This was discussed with her in detail utilizing iPad .    ANNETTE Watters  [facility]  08/29/23  13:32 EDT

## 2023-08-29 NOTE — PROGRESS NOTES
ID NOTE    CC: f/u Parainfluenza 4 and paraesophageal mass    Subj: No fever. Still w/ dry cough. EGD and bronch yesterday were unrevealing. CT done this AM and possibly OR tomorrow depending on the results.     Medications:    Current Facility-Administered Medications:     albuterol (PROVENTIL) nebulizer solution 0.083% 2.5 mg/3mL, 2.5 mg, Nebulization, Q6H PRN, Juan Estrada MD    diazePAM (VALIUM) injection 2.5 mg, 2.5 mg, Intravenous, Q6H PRN, Beverly Araya APRN    ipratropium-albuterol (DUO-NEB) nebulizer solution 3 mL, 3 mL, Nebulization, 4x Daily - RT, Beverly Araya APRN, 3 mL at 08/29/23 0709    morphine injection 2 mg, 2 mg, Intravenous, Q4H PRN, Hemanth La MD, 2 mg at 08/24/23 2233    ondansetron (ZOFRAN) injection 4 mg, 4 mg, Intravenous, Q6H PRN, Juan Estrada MD, 4 mg at 08/24/23 2234    piperacillin-tazobactam (ZOSYN) 3.375 g in iso-osmotic dextrose 50 ml (premix), 3.375 g, Intravenous, Q8H, Mora Friedman MD, Last Rate: 0 mL/hr at 08/29/23 0421, 3.375 g at 08/29/23 0834    sodium chloride 0.9 % flush 10 mL, 10 mL, Intravenous, Q12H, Juan Estrada MD, 10 mL at 08/29/23 0835    sodium chloride 0.9 % flush 10 mL, 10 mL, Intravenous, PRN, Juan Estrada MD    sodium chloride 0.9 % infusion 40 mL, 40 mL, Intravenous, PRN, Juan Estrada MD    sodium chloride 0.9 % infusion, 50 mL/hr, Intravenous, Continuous, Hemanth La MD, Last Rate: 50 mL/hr at 08/29/23 0611, 50 mL/hr at 08/29/23 0611      Objective   Vital Signs   Temp:  [97.7 °F (36.5 °C)-98.3 °F (36.8 °C)] 98.2 °F (36.8 °C)  Heart Rate:  [] 86  Resp:  [12-20] 20  BP: ()/(57-74) 102/57    Physical Exam:   General: awake, alert, NAD, very nice  Eyes: no scleral icterus  Neck: healed incision  Cardiovascular: NR  Respiratory: BLL rales resolved; dry cough   Skin: No rashes  Neurological: Alert and oriented x 3  Psychiatric: Normal mood and affect     Labs:   CBC, BMP, and blood cultures reviewed today  Lab Results    Component Value Date    WBC 7.12 08/29/2023    HGB 10.5 (L) 08/29/2023    HCT 31.5 (L) 08/29/2023    MCV 90.0 08/29/2023     08/29/2023     Lab Results   Component Value Date    GLUCOSE 88 08/29/2023    CALCIUM 8.9 08/29/2023     08/29/2023    K 3.9 08/29/2023    CO2 25.0 08/29/2023     (H) 08/29/2023    BUN 6 08/29/2023    CREATININE 0.65 08/29/2023    EGFR 117.9 08/29/2023    BCR 9.2 08/29/2023    ANIONGAP 7.0 08/29/2023     HIV negative  HCG negative  Procal 0.08    Microbiology:  8/24 COVID: negative  8/24 BCx: negative (final)  8/25 RPP + paraflu 4    New Radiology:  CT Neck (8/29): pending    Prior Radiology:  CT Neck (8/25):   1. Moderate size right periesophageal soft tissue mass at the cervicothoracic junction as described. This mass contains a small amount of air and is unchanged from yesterday's study. This could be an inflammatory or neoplastic mass.   2. No esophageal perforation was seen on yesterday's esophagram.   3. No pathologic lymphadenopathy or other neck masses are seen.   4. Relatively extensive sinusitis.     ASSESSMENT/PLAN:  Right paraesophageal fluid collection with mass effect  Bilateral lower lobe infiltrates  Fever in adult - resolved  Diarrhea  Parainfluenza 4 infection with sinusitis    She remains afebrile and her WBC has normalized. For parainfluenza 4, there is no specific treatment as this is a  self-limited illness. It is already significantly improved.     Re: paraesophageal fluid collection, the EGD and bronchoscopy were normal. She had a CT neck done this AM and might possibly go to the OR tomorrow for re-exploration depending on the results.      I will continue empiric Zosyn with duration TBD.     ID will follow.     ADDENDUM: CT report and CT surgery note reviewed. Plans for follow-up in 1 month w/ repeat scan. I will keep her on Zosyn today and then tomorrow have her transition to Augmentin 875-125 mg PO BID through 9/29/23. I will schedule her to see  me following her appt w/ Dr La.

## 2023-08-29 NOTE — PROGRESS NOTES
Name: Wilda Mayo ADMIT: 2023   : 1985  PCP: Provider, No Known    MRN: [id number] LOS: 0 days   AGE/SEX: 37 y.o. female  ROOM: [facility]     Subjective   Subjective       Patient is seen at bedside, no new complaints.       Objective   Objective   Vital Signs  Temp:  [97.7 °F (36.5 °C)-98.2 °F (36.8 °C)] 97.9 °F (36.6 °C)  Heart Rate:  [69-96] 96  Resp:  [16-20] 20  BP: (102-111)/(57-79) 104/79  SpO2:  [93 %-100 %] 100 %  on   ;   Device (Oxygen Therapy): room air  Body mass index is 21.26 kg/m².  Physical Exam  General, awake and alert.  Head and ENT, normocephalic and atraumatic.  Lungs, symmetric expansion, equal air entry bilaterally.  Heart, regular rate and rhythm.  Abdomen, soft and nontender.  Extremities, no clubbing or cyanosis.  Neuro, no focal deficits.  Skin: Warm and no rash.  Psych, normal mood and affect.  Musculoskeletal, joint examination is grossly normal.    Copied text material from yesterday's note has been reviewed for appropriate changes and remains accurate as of 23.      Results Review     I reviewed the patient's new clinical results.  Results from last 7 days   Lab Units 23  0629 23  0545 23  0617 23  0520   WBC 10*3/mm3 7.12 6.54 7.32 11.05*   HEMOGLOBIN g/dL 10.5* 9.6* 9.5* 10.3*   PLATELETS 10*3/mm3 409 364 334 333     Results from last 7 days   Lab Units 23  0629 23  0545 23  0617 23  0520   SODIUM mmol/L 140 138 139 139   POTASSIUM mmol/L 3.9 3.6 3.7 3.7   CHLORIDE mmol/L 108* 105 106 106   CO2 mmol/L 25.0 25.4 25.0 23.0   BUN mg/dL 6 4* 5* 8   CREATININE mg/dL 0.65 0.54* 0.56* 0.54*   GLUCOSE mg/dL 88 89 95 77   EGFR mL/min/1.73 117.9 123.3 122.2 123.3     Results from last 7 days   Lab Units 23  0629 23  0545 23  0520 23  0724   ALBUMIN g/dL 3.1* 2.9* 3.0* 3.2*   BILIRUBIN mg/dL 0.2 0.3 0.5 0.4   ALK PHOS U/L 171* 148* 129* 78   AST (SGOT) U/L 21 25 19 12   ALT (SGPT) U/L 34* 33 26  28     Results from last 7 days   Lab Units 08/29/23  0629 08/28/23  0545 08/27/23  0617 08/26/23  0520 08/25/23  0724   CALCIUM mg/dL 8.9 8.4* 8.2* 8.1* 8.4*   ALBUMIN g/dL 3.1* 2.9*  --  3.0* 3.2*     Results from last 7 days   Lab Units 08/24/23  1452 08/24/23  1009   PROCALCITONIN ng/mL  --  0.08   LACTATE mmol/L 1.0  --      No results found for: HGBA1C, POCGLU    CT Soft Tissue Neck With Contrast    Result Date: 8/29/2023  1.  There is a persistent soft tissue mass at the level of T1 and T2 and to the right of the trachea which has decreased slightly in size as compared to 08/25/2023. The air centrally has decreased in volume. This may represent a phlegmonous collection/abscess involving a pre-existing paratracheal cyst. An underlying neoplastic process or mass cannot be excluded. Continued surveillance is recommended. 2.  Small bilateral pleural fluid collections are appreciated which are new versus 08/25/2023.   Radiation dose reduction techniques were utilized, including automated exposure control and exposure modulation based on body size.   This report was finalized on 8/29/2023 4:50 PM by Dr. Reza Polk M.D.       I have personally reviewed all medications:  Scheduled Medications  [START ON 8/30/2023] amoxicillin-clavulanate, 1 tablet, Oral, Q12H  piperacillin-tazobactam, 3.375 g, Intravenous, Q8H  sodium chloride, 10 mL, Intravenous, Q12H    Infusions  sodium chloride, 50 mL/hr, Last Rate: 50 mL/hr (08/29/23 1241)    Diet  Diet: Regular/House Diet; Texture: Mechanical Ground (NDD 2); Fluid Consistency: Thin (IDDSI 0)    I have personally reviewed:  [x]  Laboratory   [x]  Microbiology   [x]  Radiology   [x]  EKG/Telemetry  [x]  Cardiology/Vascular   []  Pathology    []  Records       Assessment/Plan     Active Hospital Problems    Diagnosis  POA    **Pneumonia [J18.9]  Yes    Infection due to parainfluenza virus 4 [B34.8]  Yes    Neck pain [M54.2]  Yes    Asthma [J45.909]  Yes    Mediastinitis  [J98.51]  Unknown    Paratracheal gas collection [R93.89]  Yes      Resolved Hospital Problems   No resolved problems to display.       37 y.o. female admitted with Pneumonia.    Assessment and plan  1.  Complex collection/mass containing air in the right superior paraesophageal region.  There is mass effect on trachea and esophagus on the left.  Thoracic surgery consulted for further input and management. RPP positive for parainfluenza 4 and this probably accounts for her most recent symptoms. She has a sick child at home so that is the likely contact. This is a self-limited illness.  She underwent bronchoscopy and EGD.  No plans for surgical intervention.  Follow ID recommendations for antibiotics upon discharge.     2.  Pneumonia, pulmonary and infectious disease on board.      3.  Paraesophageal mass, keep patient n.p.o. with no sips, ice chips or oral meds.  Follow consultant management recommendations.     4.  History of asthma, chronic condition, does not appear to be in exacerbation.     5.  CODE STATUS is full code.  Further plans based on hospital course.            Juan Estrada MD  [facility]  08/29/23  16:54 EDT

## 2023-08-29 NOTE — PLAN OF CARE
Goal Outcome Evaluation:      VSS, IV abx and fluids continued, no complaints of pain, NPO at midnight.

## 2023-08-30 VITALS
HEART RATE: 97 BPM | TEMPERATURE: 98.1 F | BODY MASS INDEX: 21.32 KG/M2 | RESPIRATION RATE: 18 BRPM | WEIGHT: 143.96 LBS | OXYGEN SATURATION: 88 % | SYSTOLIC BLOOD PRESSURE: 103 MMHG | HEIGHT: 69 IN | DIASTOLIC BLOOD PRESSURE: 72 MMHG

## 2023-08-30 LAB
ALBUMIN SERPL-MCNC: 3.7 G/DL (ref 3.5–5.2)
ALBUMIN/GLOB SERPL: 1.3 G/DL
ALP SERPL-CCNC: 171 U/L (ref 39–117)
ALT SERPL W P-5'-P-CCNC: 39 U/L (ref 1–33)
ANION GAP SERPL CALCULATED.3IONS-SCNC: 10 MMOL/L (ref 5–15)
AST SERPL-CCNC: 20 U/L (ref 1–32)
BASOPHILS # BLD AUTO: 0.07 10*3/MM3 (ref 0–0.2)
BASOPHILS NFR BLD AUTO: 0.9 % (ref 0–1.5)
BILIRUB SERPL-MCNC: 0.2 MG/DL (ref 0–1.2)
BUN SERPL-MCNC: 7 MG/DL (ref 6–20)
BUN/CREAT SERPL: 11.1 (ref 7–25)
CALCIUM SPEC-SCNC: 9 MG/DL (ref 8.6–10.5)
CHLORIDE SERPL-SCNC: 105 MMOL/L (ref 98–107)
CO2 SERPL-SCNC: 25 MMOL/L (ref 22–29)
CREAT SERPL-MCNC: 0.63 MG/DL (ref 0.57–1)
DEPRECATED RDW RBC AUTO: 37.5 FL (ref 37–54)
EGFRCR SERPLBLD CKD-EPI 2021: 118.8 ML/MIN/1.73
EOSINOPHIL # BLD AUTO: 1.04 10*3/MM3 (ref 0–0.4)
EOSINOPHIL NFR BLD AUTO: 14 % (ref 0.3–6.2)
ERYTHROCYTE [DISTWIDTH] IN BLOOD BY AUTOMATED COUNT: 11.9 % (ref 12.3–15.4)
GLOBULIN UR ELPH-MCNC: 2.8 GM/DL
GLUCOSE SERPL-MCNC: 90 MG/DL (ref 65–99)
HCT VFR BLD AUTO: 35.1 % (ref 34–46.6)
HGB BLD-MCNC: 11.7 G/DL (ref 12–15.9)
IMM GRANULOCYTES # BLD AUTO: 0.03 10*3/MM3 (ref 0–0.05)
IMM GRANULOCYTES NFR BLD AUTO: 0.4 % (ref 0–0.5)
LYMPHOCYTES # BLD AUTO: 2.06 10*3/MM3 (ref 0.7–3.1)
LYMPHOCYTES NFR BLD AUTO: 27.8 % (ref 19.6–45.3)
MCH RBC QN AUTO: 29.3 PG (ref 26.6–33)
MCHC RBC AUTO-ENTMCNC: 33.3 G/DL (ref 31.5–35.7)
MCV RBC AUTO: 87.8 FL (ref 79–97)
MONOCYTES # BLD AUTO: 0.54 10*3/MM3 (ref 0.1–0.9)
MONOCYTES NFR BLD AUTO: 7.3 % (ref 5–12)
NEUTROPHILS NFR BLD AUTO: 3.67 10*3/MM3 (ref 1.7–7)
NEUTROPHILS NFR BLD AUTO: 49.6 % (ref 42.7–76)
NRBC BLD AUTO-RTO: 0 /100 WBC (ref 0–0.2)
PLATELET # BLD AUTO: 510 10*3/MM3 (ref 140–450)
PMV BLD AUTO: 9.3 FL (ref 6–12)
POTASSIUM SERPL-SCNC: 3.9 MMOL/L (ref 3.5–5.2)
PROT SERPL-MCNC: 6.5 G/DL (ref 6–8.5)
RBC # BLD AUTO: 4 10*6/MM3 (ref 3.77–5.28)
SODIUM SERPL-SCNC: 140 MMOL/L (ref 136–145)
WBC NRBC COR # BLD: 7.41 10*3/MM3 (ref 3.4–10.8)

## 2023-08-30 PROCEDURE — 80053 COMPREHEN METABOLIC PANEL: CPT | Performed by: INTERNAL MEDICINE

## 2023-08-30 PROCEDURE — 99232 SBSQ HOSP IP/OBS MODERATE 35: CPT | Performed by: INTERNAL MEDICINE

## 2023-08-30 PROCEDURE — 85025 COMPLETE CBC W/AUTO DIFF WBC: CPT | Performed by: INTERNAL MEDICINE

## 2023-08-30 PROCEDURE — 25010000002 PIPERACILLIN SOD-TAZOBACTAM PER 1 G: Performed by: INTERNAL MEDICINE

## 2023-08-30 RX ORDER — ACETAMINOPHEN 325 MG/1
650 TABLET ORAL EVERY 6 HOURS PRN
Status: DISCONTINUED | OUTPATIENT
Start: 2023-08-30 | End: 2023-08-30 | Stop reason: HOSPADM

## 2023-08-30 RX ORDER — AMOXICILLIN AND CLAVULANATE POTASSIUM 875; 125 MG/1; MG/1
1 TABLET, FILM COATED ORAL EVERY 12 HOURS SCHEDULED
Qty: 59 TABLET | Refills: 0 | Status: SHIPPED | OUTPATIENT
Start: 2023-08-30 | End: 2023-09-29

## 2023-08-30 RX ADMIN — AMOXICILLIN AND CLAVULANATE POTASSIUM 1 TABLET: 875; 125 TABLET, FILM COATED ORAL at 08:20

## 2023-08-30 RX ADMIN — SODIUM CHLORIDE 50 ML/HR: 9 INJECTION, SOLUTION INTRAVENOUS at 07:32

## 2023-08-30 RX ADMIN — ACETAMINOPHEN 650 MG: 325 TABLET, FILM COATED ORAL at 06:12

## 2023-08-30 RX ADMIN — Medication 10 ML: at 08:20

## 2023-08-30 RX ADMIN — PIPERACILLIN SODIUM AND TAZOBACTAM SODIUM 3.38 G: 3; .375 INJECTION, SOLUTION INTRAVENOUS at 00:34

## 2023-08-30 NOTE — PAYOR COMM NOTE
"Wilda Dunn (37 y.o. Female)        PLEASE SEE ATTACHED FOR INPT AUTH.       REF#    PLEASE CALL   OR  [phone] WITH INPT AUTH AND DAYS APPROVED.     THANK YOU    NICOLE LOCO.  LPN CCP   Date of Birth   1985    Social Security Number       Address   [address] [address]    Home Phone       MRN   [id number]       Caodaism   Non-Taoist    Marital Status                               Admission Date   8/24/23    Admission Type   Emergency    Admitting Provider   Juan Estrada MD    Attending Provider   Juan Estrada MD    Department, Room/Bed   [address], [address]/1       Discharge Date       Discharge Disposition       Discharge Destination                                 Attending Provider: Juan Estrada MD    Allergies: Vancomycin    Isolation: None   Infection: None   Code Status: CPR    Ht: 175.3 cm (69\")   Wt: 65.3 kg (143 lb 15.4 oz)    Admission Cmt: None   Principal Problem: Pneumonia [J18.9]                   Active Insurance as of 8/24/2023       Primary Coverage       Payor Plan Insurance Group Employer/Plan Group    [facility] BLUE [place] ANTH PATHWAY TRANSITION HMO NON PAR [id number]       Payor Plan Address Payor Plan Phone Number Payor Plan Fax Number Effective Dates    [address]   4/1/2023 - None Entered    [facility] [id number]         Subscriber Name Subscriber Birth Date Member ID       WILDA DUNN 1985 [id number]                     Emergency Contacts        (Rel.) Home Phone Work Phone Mobile Phone    ROSCOE VERDUZCO (Spouse) -- -- [phone]                "

## 2023-08-30 NOTE — PROGRESS NOTES
"                                              LOS: 0 days   Patient Care Team:  Provider, No Known as PCP - General    Chief Complaint:  F/up parainfluenza infection, abnormal CT chest    Subjective   Interval History  On RA .  No cough.  Sinus congestion improved    REVIEW OF SYSTEMS:     RESPIRATORY: No shortness of breath    CONSTITUTIONAL: No fever or chills.     Ventilator/Non-Invasive Ventilation Settings (From admission, onward)      None                  Physical Exam:     Vital Signs  Temp:  [97.3 °F (36.3 °C)-98.2 °F (36.8 °C)] 98.2 °F (36.8 °C)  Heart Rate:  [64-96] 88  Resp:  [16-20] 18  BP: ()/(66-79) 99/66    Intake/Output Summary (Last 24 hours) at 8/30/2023 0959  Last data filed at 8/30/2023 0840  Gross per 24 hour   Intake 118 ml   Output --   Net 118 ml       Flowsheet Rows      Flowsheet Row First Filed Value   Admission Height 175.3 cm (69\") Documented at 08/24/2023 0927   Admission Weight 68.5 kg (151 lb) Documented at 08/24/2023 0927            PPE used per hospital policy    General Appearance:   Alert, cooperative, in no acute distress   ENMT:  Mallampati score 2, moist mucous membrane   Eyes:  Pupils equal and reactive to light. EOMI   Neck:    Trachea midline. No thyromegaly.   Lungs:   Good and equal air entry bilaterally.  No audible crackles or wheezing.    Heart:   Regular rhythm and normal rate, normal S1 and S2, no         murmur   Skin:   No rash or ecchymosis   Abdomen:     Soft. No tenderness. No HSM.   Neuro/psych:  Conscious, alert, oriented x3. Strength 5/5 in upper and lower  ext.  Appropriate mood and affect   Extremities:  No cyanosis, clubbing or edema.  Warm extremities and well-perfused          Results Review:        Results from last 7 days   Lab Units 08/30/23  0541 08/29/23  0629 08/28/23  0545   SODIUM mmol/L 140 140 138   POTASSIUM mmol/L 3.9 3.9 3.6   CHLORIDE mmol/L 105 108* 105   CO2 mmol/L 25.0 25.0 25.4   BUN mg/dL 7 6 4*   CREATININE mg/dL 0.63 0.65 0.54* "   GLUCOSE mg/dL 90 88 89   CALCIUM mg/dL 9.0 8.9 8.4*       Results from last 7 days   Lab Units 08/24/23  1229 08/24/23  1009   HSTROP T ng/L <6 <6       Results from last 7 days   Lab Units 08/30/23  0541 08/29/23  0629 08/28/23  0545   WBC 10*3/mm3 7.41 7.12 6.54   HEMOGLOBIN g/dL 11.7* 10.5* 9.6*   HEMATOCRIT % 35.1 31.5* 29.2*   PLATELETS 10*3/mm3 510* 409 364                                     I reviewed the patient's new clinical results.        Medication Review:   amoxicillin-clavulanate, 1 tablet, Oral, Q12H  sodium chloride, 10 mL, Intravenous, Q12H        sodium chloride, 50 mL/hr, Last Rate: 50 mL/hr (08/30/23 0732)        Diagnostic imaging:  I personally and independently reviewed the following images:  CT chest 8/24/2023: Upper paraesophageal mass (not included below).  Small pulmonary infiltrates bilaterally especially at the bases as shown below.      Assessment     Aspiration pneumonia +/- pneumonitis  Parainfluenza infection and sinusitis  Right paraesophageal mass fluid collection  Leukocytosis  History of mediastinitis  History of asthma, mild intermittent        Plan         Incentive spirometry  DuoNeb as needed.  Can continue albuterol HFA inhaler at home (she already has a).  Antibiotics with Augmentin for 4-6 weeks..  ID following  Noted plan for follow-up CT neck by thoracic surgery as outpatient    Dispo: Okay to discharge from pulmonary perspective.  No need for pulmonary follow-up since her asthma is well controlled.    Reji Pinzon MD  08/30/23  09:59 EDT            This note was dictated utilizing Dragon dictation

## 2023-08-30 NOTE — PAYOR COMM NOTE
"Wilda Dunn (37 y.o. Female)      PLEASE SEE ATTACHED FOR INPT AUTH.         REF#[id number]     PLEASE CALL   OR  [phone] WITH INPT AUTH AND DAYS APPROVED.      THANK YOU     NICOLE LOCO.  LPN [facility]     Date of Birth   1985    Social Security Number       Address   [address] [id number]    Home Phone       MRN   [id number]       Tenriism   Non-Lutheran    Marital Status                               Admission Date   8/24/23    Admission Type   Emergency    Admitting Provider   Juan Estrada MD    Attending Provider   Juan Estrada MD    Department, Room/Bed   [address], S601/1       Discharge Date       Discharge Disposition       Discharge Destination                                 Attending Provider: Juan Estrada MD    Allergies: Vancomycin    Isolation: None   Infection: None   Code Status: CPR    Ht: 175.3 cm (69\")   Wt: 65.3 kg (143 lb 15.4 oz)    Admission Cmt: None   Principal Problem: Pneumonia [J18.9]                   Active Insurance as of 8/24/2023       Primary Coverage       Payor Plan Insurance Group Employer/Plan Group    ANTH [employer] ANTH PATHWAY TRANSITION HMO NON PAR [id number]       Payor Plan Address Payor Plan Phone Number Payor Plan Fax Number Effective Dates    [address]   4/1/2023 - None Entered    [facility] [id number]         Subscriber Name Subscriber Birth Date Member ID       WILDA DUNN 1985 [id number]                     Emergency Contacts        (Rel.) Home Phone Work Phone Mobile Phone    ROSCOE VERDUZCO (Spouse) -- -- [phone]              [place]: [facility] [id number]  Tax ID [id number]     History & Physical        Juan Estrada MD at 08/24/23 1644              Patient Name:  Wilda Mayo  YOB: 1985  MRN:  [id number]  Admit Date:  8/24/2023  Patient Care Team:  Provider, No Known as PCP - " General      Subjective  History Present Illness     Chief Complaint   Patient presents with    Cough    URI         History of Present Illness  This is a 37-year-old female, presents to the emergency room, has past medical history significant for asthma, pneumonia, mediastinitis, complains of right-sided chest discomfort, mild shortness of breath, she also complains of chest and neck pain, similar to episode of an infectious/inflammatory process in her chest back in April that required surgical intervention by thoracic surgery.  Patient will be admitted to hospitalist service for further work-up of symptoms.        Review of Systems     Review of Systems   Constitutional: Negative for activity change and appetite change.   HENT: Negative for congestion and dental problem.    Eyes: Negative for discharge and itching.   Respiratory: Negative for apnea and chest tightness.    Cardiovascular: Negative for palpitations.   Gastrointestinal: Negative for abdominal distention and abdominal pain.   Endocrine: Negative for cold intolerance and heat intolerance.   Genitourinary: Negative for difficulty urinating and frequency.   Musculoskeletal: Negative for arthralgias and back pain.   Skin: Negative for color change and pallor.   Allergic/Immunologic: Negative for environmental allergies and food allergies.   Neurological: Negative for dizziness and facial asymmetry.   Hematological: Negative for adenopathy. Does not bruise/bleed easily.   Psychiatric/Behavioral: Negative for agitation and suicidal ideas.       Personal History     Past Medical History:   Diagnosis Date    Asthma     Pneumonia 8/24/2023     Past Surgical History:   Procedure Laterality Date    ESOPHAGUS SURGERY N/A 04/11/2023    Procedure: EXPLORATION OF NECK, EXCISION OF NECK MASS, EGD, AND FLEXIBLE BRONCHOSCOPY;  Surgeon: Hemanth La MD;  Location: [facility];  Service: Thoracic;  Laterality: N/A;     Family History   Problem Relation Age of Onset     Cancer Mother     Cancer Paternal Aunt      Social History     Tobacco Use    Smoking status: Never     Passive exposure: Never    Smokeless tobacco: Never   Vaping Use    Vaping Use: Never used   Substance Use Topics    Alcohol use: Never    Drug use: Never     No current facility-administered medications on file prior to encounter.     Current Outpatient Medications on File Prior to Encounter   Medication Sig Dispense Refill    albuterol sulfate  (90 Base) MCG/ACT inhaler Inhale 2 puffs Every 4 (Four) Hours As Needed for Wheezing.      Fluticasone-Salmeterol (ADVAIR/WIXELA) 250-50 MCG/ACT DISKUS Inhale 1 puff 2 (Two) Times a Day.      montelukast (SINGULAIR) 10 MG tablet Take 1 tablet by mouth Every Night.       Allergies   Allergen Reactions    Vancomycin Rash       Objective   Objective     Vital Signs  Temp:  [98.4 °F (36.9 °C)-101.4 °F (38.6 °C)] 98.9 °F (37.2 °C)  Heart Rate:  [] 97  Resp:  [18] 18  BP: ()/(57-77) 93/57  SpO2:  [95 %-100 %] 96 %  on   ;   Device (Oxygen Therapy): room air  Body mass index is 21.26 kg/m².    Physical Exam  General, awake and alert.  Head and ENT, normocephalic and atraumatic.  Neck scar noted  Lungs, symmetric expansion, equal air entry bilaterally.  Heart, regular rate and rhythm.  Abdomen, soft and nontender.  Extremities, no clubbing or cyanosis.  Neuro, no focal deficits.  Skin: Warm and no rash.  Psych, normal mood and affect.  Musculoskeletal, joint examination is grossly normal.    Results Review:  I reviewed the patient's new clinical results.  I reviewed the patient's new imaging results and agree with the interpretation.  I reviewed the patient's other test results and agree with the interpretation  I personally viewed and interpreted the patient's EKG/Telemetry data  Discussed with ED provider.    Lab Results (last 24 hours)       Procedure Component Value Units Date/Time    COVID-19,[facility] KIAN IN-HOUSE CEPHEID/CHINYERE NP SWAB IN TRANSPORT MEDIA 8-12 HR  TAT - Swab, Nasopharynx [[id number]]  (Normal) Collected: 08/24/23 1005    Specimen: Swab from Nasopharynx Updated: 08/24/23 1055     COVID19 Not Detected    Narrative:      Fact sheet for providers: https://www.fda.gov/media/162032/download     Fact sheet for patients: https://www.fda.gov/media/168681/download    CBC & Differential [[id number]]  (Abnormal) Collected: 08/24/23 1009    Specimen: Blood Updated: 08/24/23 1023    Narrative:      The following orders were created for panel order CBC & Differential.  Procedure                               Abnormality         Status                     ---------                               -----------         ------                     CBC Auto Differential[[id number]]        Abnormal            Final result                 Please view results for these tests on the individual orders.    Basic Metabolic Panel [[id number]]  (Abnormal) Collected: 08/24/23 1009    Specimen: Blood Updated: 08/24/23 1043     Glucose 108 mg/dL      BUN 5 mg/dL      Creatinine 0.65 mg/dL      Sodium 138 mmol/L      Potassium 3.8 mmol/L      Chloride 105 mmol/L      CO2 24.4 mmol/L      Calcium 9.1 mg/dL      BUN/Creatinine Ratio 7.7     Anion Gap 8.6 mmol/L      eGFR 117.9 mL/min/1.73     Narrative:      GFR Normal >60  Chronic Kidney Disease <60  Kidney Failure <15      hCG, Serum, Qualitative [[id number]]  (Normal) Collected: 08/24/23 1009    Specimen: Blood Updated: 08/24/23 1038     HCG Qualitative Negative    High Sensitivity Troponin T [[id number]]  (Normal) Collected: 08/24/23 1009    Specimen: Blood Updated: 08/24/23 1050     HS Troponin T <6 ng/L     Narrative:          Procalcitonin [[id number]]  (Normal) Collected: 08/24/23 1009    Specimen: Blood Updated: 08/24/23 1050     Procalcitonin 0.08 ng/mL     Narrative:          CBC Auto Differential [[id number]]  (Abnormal) Collected: 08/24/23 1009    Specimen: Blood Updated: 08/24/23 1023     WBC 16.62 10*3/mm3      RBC 4.33 10*6/mm3       Hemoglobin 12.8 g/dL      Hematocrit 39.2 %      MCV 90.5 fL      MCH 29.6 pg      MCHC 32.7 g/dL      RDW 12.7 %      RDW-SD 41.9 fl      MPV 9.6 fL      Platelets 347 10*3/mm3      Neutrophil % 76.1 %      Lymphocyte % 12.0 %      Monocyte % 6.5 %      Eosinophil % 4.6 %      Basophil % 0.4 %      Immature Grans % 0.4 %      Neutrophils, Absolute 12.65 10*3/mm3      Lymphocytes, Absolute 2.00 10*3/mm3      Monocytes, Absolute 1.08 10*3/mm3      Eosinophils, Absolute 0.76 10*3/mm3      Basophils, Absolute 0.06 10*3/mm3      Immature Grans, Absolute 0.07 10*3/mm3      nRBC 0.0 /100 WBC     High Sensitivity Troponin T 2Hr [[id number]] Collected: 08/24/23 1229    Specimen: Blood Updated: 08/24/23 1301     HS Troponin T <6 ng/L      Troponin T Delta --     Comment: Unable to calculate.       Narrative:          Blood Culture - Blood, Arm, Left [[id number]] Collected: 08/24/23 1452    Specimen: Blood from Arm, Left Updated: 08/24/23 1515    Lactic Acid, Plasma [[id number]]  (Normal) Collected: 08/24/23 1452    Specimen: Blood from Arm, Left Updated: 08/24/23 1532     Lactate 1.0 mmol/L     Blood Culture - Blood, Arm, Left [[id number]] Collected: 08/24/23 1456    Specimen: Blood from Arm, Left Updated: 08/24/23 1515            Imaging Results (Last 24 Hours)       Procedure Component Value Units Date/Time    FL Esophagram Complete Single Contrast [[id number]] Collected: 08/24/23 1447     Updated: 08/24/23 1454    Narrative:      ESOPHAGRAM 8/24/2023     HISTORY: Evaluate for esophageal leak.      image of the chest shows nothing acute.     Single contrast esophagram was performed with Gastrografin. No  aspiration is seen. Normal swallowing mechanism is demonstrated. No  esophageal strictures, reflux, hiatal hernia or evidence of esophagitis  is seen. There is no evidence of contrast leak. The upper thoracic  esophagus appears deviated minimally to the left and there may be some  minimal mass effect on the rightward  aspect of the esophagus.       Impression:      1. No evidence of esophageal perforation.     Fluoroscopy time 56 seconds 66 images 809 dose area product     This report was finalized on 8/24/2023 2:50 PM by Dr. Reed Wharton M.D.       CT Angiogram Chest Pulmonary Embolism [[id number]] Collected: 08/24/23 1209     Updated: 08/24/23 1214    Narrative:      CT ANGIOGRAM OF THE CHEST. MULTIPLE CORONAL, SAGITTAL, AND 3-D  RECONSTRUCTIONS.     HISTORY: 37-year-old female with chest pain and cough.     TECHNIQUE: Radiation dose reduction techniques were utilized, including  automated exposure control and exposure modulation based on body size.   CT angiogram of the chest was performed following the administration of  IV contrast. Multiple coronal, sagittal, and 3-D reconstruction images  were obtained. Compared with chest CTA and neck CT 04/11/2023. Also  correlated with esophagram 04/11/2023.     FINDINGS: There is no evidence for pulmonary thromboemboli. Again seen  is a complex air-containing mass/collection in the superior right  paraesophageal region at the thoracic inlet and superior mediastinum.  The margins were better seen on the neck CT and appear without  significant change since the chest CTA performed the same day. There is  mass effect on the on the trachea and esophagus to the left. The  measurements are approximately 3.1 x 2.6 cm which is grossly unchanged.  The esophageal wall is indistinct and there is indistinct increased  density throughout the mediastinum and sherwin without discrete  pathologically enlarged nodes. There are no pleural or pericardial  effusions. There is mild asymmetric bronchial thickening at the right  lower lobe which was not definitively present previously and there are  new ill-defined mixed-density peribronchial airspace opacities in the  perihilar region of the left lower lobe and at both lung bases. There is  also ill-defined ground-glass opacification at the right lower  lobe.       Impression:      Outside facility neck CT 5/5/2023 has become available for  comparison. This collection was predominantly air-filled on the outside  facility neck CT. A RECURRENT INFECTED RIGHT PARATRACHEAL CYST IS  SUSPECTED TO BE THE ETIOLOGY FOR THESE FINDINGS.        1. Persistent approximately 3.1 x 2.6 cm complex collection/mass  containing air in the superior right paraesophageal region. As discussed  previously, an infectious or inflammatory etiology is suspected, but the  etiology is uncertain and malignancy cannot be entirely excluded. There  is mass effect on the trachea and esophagus to the left. No definite  esophageal leak was seen on the previous esophagram. Thoracic surgery  consultation is recommended.  2. The new bilateral lower lobe airspace opacities as described likely  represent bronchiolitis/developing pneumonia and aspiration is a  consideration.  3. There is no evidence for pulmonary thromboemboli.                         ECG 12 Lead Chest Pain   Final Result   HEART RATE= 97  bpm   RR Interval= 619  ms   ME Interval= 141  ms   P Horizontal Axis= 14  deg   P Front Axis= 57  deg   QRSD Interval= 82  ms   QT Interval= 343  ms   QRS Axis= 66  deg   T Wave Axis= -16  deg   - BORDERLINE ECG -   Sinus rhythm   Borderline T abnormalities, inferior leads   When compared with ECG of 11-Apr-2023 12:42:17,   No significant change   Electronically Signed By: David Pierre ([facility]) 24-Aug-2023 14:30:12   Date and Time of Study: 2023-08-24 10:17:45           Assessment/Plan     Active Hospital Problems    Diagnosis  POA    **Pneumonia [J18.9]  Yes    Neck pain [M54.2]  Yes    Asthma [J45.909]  Yes    Paratracheal gas collection [R93.89]  Yes      Resolved Hospital Problems   No resolved problems to display.       Assessment and plan  1.  Complex collection/mass containing air in the right superior paraesophageal region.  There is mass effect on trachea and esophagus on the left.  Thoracic surgery  consulted for further input and management.    2.  Pneumonia, initiate antibiotics, patient would benefit from pulmonary evaluation.  Infectious disease consultation will also be requested.    3.  Paraesophageal mass, keep patient n.p.o. with no sips, ice chips or oral meds.    4.  History of asthma, chronic condition, does not appear to be in exacerbation.    5.  CODE STATUS is full code.  Further plans based on hospital course.       Juan Estrada MD  [facility]  08/24/23  16:47 EDT    Electronically signed by Juan Estrada MD at 08/24/23 2037          Emergency Department Notes        Francis Yung RN at 08/24/23 1450              Kane Wood MD at 08/24/23 1005        Procedure Orders    1. Critical Care [[id number]] ordered by Kane Wood MD                  EMERGENCY DEPARTMENT ENCOUNTER    Room Number:  13/13  PCP: Provider, No Known  Historian: Patient,       HPI:  Chief Complaint: Chest pain, neck pain, cough  A complete HPI/ROS/PMH/PSH/SH/FH are unobtainable due to: None    Context: Wilda Mayo is a 37 y.o. female who presents to the ED via private vehicle for evaluation of recent travel to [place] for 5 days, returned on Tuesday.  Had a fever of 104 while there.  Has developed cough and congestion.  Also having some right-sided chest discomfort and mild shortness of breath worse with deep breathing.  However, her symptoms of the chest pain and neck pain are similar to an episode of an infectious/inflammatory process in her chest back in April that required surgery.  They were concerned that maybe this was recurring.  She has not had any residual high fevers.  No recent sick contacts.  Has had nausea but no vomiting.  Has not taken anything for the pain.      MEDICAL RECORD REVIEW    External (non-ED) record review: Op note reviewed from April 11, 2023 with Dr. 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    Asthma 04/12/2023     Resolved Ambulatory Problems     Diagnosis Date Noted    No Resolved Ambulatory Problems     Past Medical History:   Diagnosis Date    Pneumonia 8/24/2023         PAST SURGICAL HISTORY  Past Surgical History:   Procedure Laterality Date    ESOPHAGUS SURGERY N/A 04/11/2023    Procedure: EXPLORATION OF NECK, EXCISION OF NECK MASS, EGD, AND FLEXIBLE BRONCHOSCOPY;  Surgeon: Hemanth La MD;  Location: [facility] OR;  Service: Thoracic;  Laterality: N/A;         FAMILY HISTORY  Family History   Problem Relation Age of Onset    Cancer Mother     Cancer Paternal Aunt          SOCIAL HISTORY  Social History     Socioeconomic History    Marital status:    Tobacco Use    Smoking status: Never     Passive exposure: Never    Smokeless tobacco: Never   Vaping Use    Vaping Use: Never used   Substance and Sexual Activity    Alcohol use: Never    Drug use: Never    Sexual activity: Yes     Partners: Male     Birth control/protection: None         ALLERGIES  Vancomycin        REVIEW OF SYSTEMS  Review of Systems     All systems reviewed and negative except for those discussed in HPI.       PHYSICAL EXAM    I have reviewed the triage vital signs and nursing notes.    ED Triage Vitals   Temp Heart Rate Resp BP SpO2   08/24/23 0927 08/24/23 0927 08/24/23 0932 08/24/23 0932 08/24/23 0927   99.6 °F (37.6 °C) (!) 128 18 114/74 95 %      Temp src Heart Rate Source Patient Position BP Location FiO2 (%)   08/24/23 0927 08/24/23 0927 -- -- --   Tympanic Monitor          Physical Exam  General: No acute distress, nontoxic  HEENT: Mucous membranes moist, atraumatic, EOMI  Neck: Full  ROM, right-sided well-healing surgical scar, trachea midline  Pulm: Symmetric chest rise, nonlabored, lungs CTAB  Cardiovascular: Mild regular rhythm tachycardia, intact distal pulses  GI: Soft, nontender, nondistended, no rebound, no guarding, bowel sounds present  MSK: Full ROM, no deformity  Skin: Warm, dry  Neuro: Awake, alert, oriented x 4, GCS 15, moving all extremities, no focal deficits  Psych: Calm, cooperative      PROCEDURES    Critical Care  Performed by: Kane Wood MD  Authorized by: Kane Wood MD     Critical care provider statement:     Critical care time (minutes):  33    Critical care time was exclusive of:  Separately billable procedures and treating other patients    Critical care was necessary to treat or prevent imminent or life-threatening deterioration of the following conditions:  Sepsis (mediastinal mass with mass effect, sepsis with pneumonia)    Critical care was time spent personally by me on the following activities:  Development of treatment plan with patient or surrogate, discussions with consultants, evaluation of patient's response to treatment, examination of patient, obtaining history from patient or surrogate, ordering and performing treatments and interventions, ordering and review of laboratory studies, ordering and review of radiographic studies, pulse oximetry, re-evaluation of patient's condition and review of old charts    Care discussed with: admitting provider      Care discussed with comment:  Dr. Estrada, admitting; ANNETTE Sellers, Thoracic Surgery    EKG - Per my independent interpretation:    EKG Time: 1017  Rhythm/Rate: Sinus rhythm with rate of 97  Normal axis  Normal intervals  Nonspecific T wave abnormalities, T wave inversion noted in lead III   No STEMI       No emergent changes with slightly increased rate as compared to April 11, 2023      MEDICATIONS GIVEN IN ER    Medications   cefTRIAXone (ROCEPHIN) 2,000 mg in sodium chloride 0.9 % 100 mL  IVPB-VTB (2,000 mg Intravenous New Bag 8/24/23 1457)   azithromycin (ZITHROMAX) 500 mg in sodium chloride 0.9 % 250 mL IVPB-VTB (has no administration in time range)   acetaminophen (TYLENOL) suppository 650 mg (has no administration in time range)   sodium chloride 0.9 % bolus 1,000 mL (0 mL Intravenous Stopped 8/24/23 1229)   iopamidol (ISOVUE-370) 76 % injection 100 mL (95 mL Intravenous Given by Other 8/24/23 1124)   diatrizoate meglumine-sodium (GASTROGRAFIN) 66-10 % oral solution 240 mL (120 mL Rectal Given 8/24/23 1419)         PROGRESS, DATA ANALYSIS, CONSULTS, AND MEDICAL DECISION MAKING    Please note that this section constitutes my independent interpretation of clinical data including lab results, radiology, EKG's.  This constitutes my independent professional opinion regarding differential diagnosis and management of this patient.  It may include any factors such as history from outside sources, review of external records, social determinants of health, management of medications, response to those treatments, and discussions with other providers.    Differential Diagnosis and Plan: Initial concern for viral process including possibly COVID, community-acquired pneumonia, PE, pneumothorax, recurrent inflammatory process in the mediastinum, dehydration, renal failure, electrolyte abnormalities, among others.  Plan for labs, CT scan of the chest, viral swab, and reevaluation with results.    Additional sources:  - Discussed/ obtained information from independent historians:       - Chronic or social conditions impacting care:      - Shared decision making:  Patient and  at bedside fully updated on and in agreement with the course and plan moving forward    ED Course as of 08/24/23 1459   Thu Aug 24, 2023   1032 WBC(!): 16.62  12.7 four months ago [DC]   1032 Hemoglobin: 12.8 [DC]   1032 Platelets: 347 [DC]   1040 HCG Qualitative: Negative [DC]   1052 Procalcitonin: 0.08 [DC]   1052 HS Troponin T: <6  [DC]   1052 Glucose(!): 108 [DC]   1052 BUN(!): 5 [DC]   1052 Creatinine: 0.65 [DC]   1052 Sodium: 138 [DC]   1052 Potassium: 3.8 [DC]   1107 COVID19: Not Detected [DC]   1138 CT Angiogram Chest Pulmonary Embolism  Per my independent interpretation of the CT angio of the chest, no evidence of pneumothorax, no overt obvious pneumonia, no overt obvious pulmonary embolism although subtle of PE could be missed, will defer to final radiology report [DC]   1226 CT Angiogram Chest Pulmonary Embolism  Radiology report reviewed, no evidence of PE.  Complex air-containing mass/collection in the superior right paraesophageal region of the thoracic inlet and superior mediastinum.  There is mass effect on the trachea and esophagus to the left.  Dimensions of 3.1 x 2.6 cm grossly unchanged.  Esophageal wall is indistinct and there is indistinct increased density throughout the mediastinum and sherwin without discrete pathologically enlarged nodes.  Mild asymmetric bronchial thickening at the right lower lobe not definitively present previously and there is a new ill-defined mixed density parabronchial airspace opacity in the perihilar region of the left lower lobe and at both lung bases, also ill-defined groundglass opacification of the right lower lobe. [DC]   1230 Procalcitonin: 0.08 [DC]   1230 HS Troponin T: <6 [DC]   1315 Discussed with Thoracic Surgery APRN, she will discuss with Dr. La [DC]   1322 Esophagram ordered by thoracic surgery, they will evaluate at bedside [DC]   1421 Discussed with Dr. Estrada, [facility], discussed patient's clinical course and findings today, treatment modalities, thoracic surgery consult and evaluation, and need for hospitalization. [DC]      ED Course User Index  [DC] Kane Wood MD       Hospitalization Considered?: yes    Orders Placed During This Visit:  Orders Placed This Encounter   Procedures    COVID-19,BH KIAN IN-HOUSE CEPHEID/CHINYERE NP SWAB IN TRANSPORT MEDIA 8-12 HR TAT - Swab, Nasopharynx     Blood Culture - Blood,    Blood Culture - Blood,    CT Angiogram Chest Pulmonary Embolism    FL Esophagram Complete Single Contrast    Basic Metabolic Panel    hCG, Serum, Qualitative    High Sensitivity Troponin T    Procalcitonin    CBC Auto Differential    High Sensitivity Troponin T 2Hr    Lactic Acid, Plasma    NPO Diet NPO Type: Strict NPO    Inpatient Thoracic Surgery Consult    [facility] (on-call MD unless specified) Details    ECG 12 Lead Chest Pain    Initiate Observation Status    CBC & Differential       Additional orders considered but not placed:      Independent interpretation of labs, radiology studies, and discussions with consultants: See ED Course        AS OF 14:59 EDT VITALS:    BP - 118/77  HR - 103  TEMP - (!) 101.4 °F (38.6 °C) (Oral)  02 SATS - 100%        DIAGNOSIS  Final diagnoses:   Community acquired pneumonia, unspecified laterality   Mediastinitis         DISPOSITION  HOSPITALIZATION    Discussed treatment plan and reason for hospitalization with pt/family and hospitalizing physician.  Pt/family voiced understanding of the plan for hospitalization for further testing/treatment as needed.                 --    Please note that portions of this were completed with a voice recognition program.       Note Disclaimer: At [facility], we believe that sharing information builds trust and better relationships. You are receiving this note because you are receiving care at [facility] or recently visited. It is possible you will see health information before a provider has talked with you about it. This kind of information can be easy to misunderstand. To help you fully understand what it means for your health, we urge you to discuss this note with your provider.           Kane Wood MD  08/24/23 1500      Electronically signed by Kane Wood MD at 08/24/23 1500       Oxygen Therapy (since admission)       Date/Time SpO2 Device (Oxygen Therapy) Flow (L/min) Oxygen Concentration (%)  ETCO2 (mmHg)    08/30/23 0825 -- room air -- -- --    08/29/23 0709 93 room air -- -- --    08/28/23 0739 97 room air -- -- --    08/27/23 0725 97 -- -- -- --    08/26/23 0720 96 room air -- -- --    08/25/23 0721 96 room air -- -- --    08/24/23 0927 95 room air -- -- --          Intake & Output (last 7 days)         08/23 0701 08/24 0700 08/24 0701 08/25 0700 08/25 0701 08/26 0700 08/26 0701 08/27 0700 08/27 0701 08/28 0700 08/28 0701 08/29 0700 08/29 0701 08/30 0700 08/30 0701 08/31 0700    P.O.  0  210 210  0 118    I.V. (mL/kg)   960 (14.7)   800 (12.3)      IV Piggyback  1100  50        Total Intake(mL/kg)  1100 (16.8) 960 (14.7) 260 (4) 210 (3.2) 800 (12.3) 0 (0) 118 (1.8)    Net  +1100 +960 +260 +210 +800 0 +118                Urine Unmeasured Occurrence  1 x 4 x  1 x       Stool Unmeasured Occurrence  1 x 1 x               Lines, Drains & Airways       Active LDAs       Name Placement date Placement time Site Days    Peripheral IV 08/24/23 1008 Right Antecubital 08/24/23  1008  Antecubital  5    Peripheral IV 08/28/23 1420 Left;Posterior Hand 08/28/23  1420  Hand  1              Inactive LDAs       Name Placement date Placement time Removal date Removal time Site Days    [REMOVED] Peripheral IV 05/05/23 1404 Left Antecubital 05/05/23  1404  08/24/23  0937  Antecubital  110                  Medication Administration Report for Wilda Swenson as of 08/30/23 0931     Legend:    Given Hold Not Given Due Canceled Entry Other Actions    Time Time (Time) Time Time-Action         Discontinued     Completed     Future     MAR Hold     Linked             Medications 08/24/23 08/25/23 08/26/23 08/27/23 08/28/23 08/29/23 08/30/23      acetaminophen (TYLENOL) tablet 650 mg  Dose: 650 mg  Freq: Every 6 Hours PRN Route: PO  PRN Reason: Mild Pain  Start: 08/30/23 0602   Admin Instructions:   If given for fever, use fever parameter: fever greater than 100.4 °F  Based on patient request - if ordered for  moderate or severe pain, provider allows for administration of a medication prescribed for a lower pain scale.    Do not exceed 4 grams of acetaminophen in a 24 hr period. Max dose of 2gm for AST/ALT greater than 120 units/L.    If given for pain, use the following pain scale:   Mild Pain = Pain Score of 1-3, CPOT 1-2  Moderate Pain = Pain Score of 4-6, CPOT 3-4  Severe Pain = Pain Score of 7-10, CPOT 5-8          0612-Given           albuterol (PROVENTIL) nebulizer solution 0.083% 2.5 mg/3mL  Dose: 2.5 mg  Freq: Every 6 Hours PRN Route: NEBULIZATION  PRN Reason: Shortness of Air  Start: 08/24/23 1644              amoxicillin-clavulanate (AUGMENTIN) 875-125 MG per tablet 1 tablet  Dose: 1 tablet  Freq: Every 12 Hours Scheduled Route: PO  Indications Comment: neck abscess  Start: 08/30/23 0900   End: 09/29/23 0859          0820-Given       2100           diazePAM (VALIUM) injection 2.5 mg  Dose: 2.5 mg  Freq: Every 6 Hours PRN Route: IV  PRN Reason: Muscle Spasms  Start: 08/24/23 1743   End: 08/31/23 1742   Admin Instructions:   May give each 5 mg IV push over 1 minute. May be injected through infusion tubing using port closest to vein insertion. Do not mix or dilute with other solutions.              ipratropium-albuterol (DUO-NEB) nebulizer solution 3 mL  Dose: 3 mL  Freq: Every 6 Hours PRN Route: NEBULIZATION  PRN Reason: Shortness of Air  Start: 08/29/23 1415   Admin Instructions:   Include Respiratory Treatment Education              morphine injection 2 mg  Dose: 2 mg  Freq: Every 4 Hours PRN Route: IV  PRN Reason: Severe Pain  Start: 08/24/23 1744   End: 08/31/23 1743   Admin Instructions:   Based on patient request - if ordered for moderate or severe pain, provider allows for administration of a medication prescribed for a lower pain scale.  If given for pain, use the following pain scale:  Mild Pain = Pain Score of 1-3, CPOT 1-2  Moderate Pain = Pain Score of 4-6, CPOT 3-4  Severe Pain = Pain Score of 7-10,  CPOT 5-8    2233-Given [C]                 ondansetron (ZOFRAN) injection 4 mg  Dose: 4 mg  Freq: Every 6 Hours PRN Route: IV  PRN Reasons: Nausea,Vomiting  Start: 08/24/23 1646   Admin Instructions:   If BOTH ondansetron (ZOFRAN) and promethazine (PHENERGAN) are ordered use ondansetron first and THEN promethazine IF ondansetron is ineffective.    2234-Given                 sodium chloride 0.9 % flush 10 mL  Dose: 10 mL  Freq: As Needed Route: IV  PRN Reason: Line Care  Start: 08/24/23 1645              sodium chloride 0.9 % flush 10 mL  Dose: 10 mL  Freq: Every 12 Hours Scheduled Route: IV  Start: 08/24/23 2100    2040-Canceled Entry        1002-Given       2224-Given        (0956)-Not Given       (2102)-Not Given        0822-Given        0030-Given       0815-Given        0022-Given       0835-Given       2135-Given        0820-Given       2100           sodium chloride 0.9 % infusion  Rate: 50 mL/hr Dose: 50 mL/hr  Freq: Continuous Route: IV  Start: 08/24/23 1745    1734-New Bag       2040-Currently Infusing        0005-Currently Infusing       1323-Currently Infusing       1516-New Bag       1849-Currently Infusing       2224-Currently Infusing        0213-Currently Infusing       0436-New Bag       0821-Rate/Dose Change         1353-Stopped [C]        0021-New Bag       0221-Currently Infusing       0611-Currently Infusing       1241-Currently Infusing        0732-New Bag           sodium chloride 0.9 % infusion 40 mL  Dose: 40 mL  Freq: As Needed Route: IV  PRN Reason: Line Care  Start: 08/24/23 1645   Admin Instructions:   Following administration of an IV intermittent medication, flush line with 40mL NS at 100mL/hr.             Completed Medications  Medications 08/24/23 08/25/23 08/26/23 08/27/23 08/28/23 08/29/23 08/30/23       acetaminophen (TYLENOL) suppository 650 mg  Dose: 650 mg  Freq: Once Route: RE  Start: 08/24/23 1450   End: 08/24/23 1459   Admin Instructions:   If given for fever, use fever  parameter: fever greater than 100.4 °F  Based on patient request - if ordered for moderate or severe pain, provider allows for administration of a medication prescribed for a lower pain scale.    Do not exceed 4 grams of acetaminophen in a 24 hr period. Max dose of 2gm for AST/ALT greater than 120 units/L.    If given for pain, use the following pain scale:   Mild Pain = Pain Score of 1-3, CPOT 1-2  Moderate Pain = Pain Score of 4-6, CPOT 3-4  Severe Pain = Pain Score of 7-10, CPOT 5-8    1459-Given                 azithromycin (ZITHROMAX) 500 mg in sodium chloride 0.9 % 250 mL IVPB-VTB  Dose: 500 mg  Freq: Once Route: IV  Indications of Use: PNEUMONIA  Start: 08/24/23 1425   End: 08/24/23 2040    1528-New Bag       2040-Stopped                 cefTRIAXone (ROCEPHIN) 2,000 mg in sodium chloride 0.9 % 100 mL IVPB-VTB  Dose: 2,000 mg  Freq: Once Route: IV  Indications of Use: PNEUMONIA  Start: 08/24/23 1426   End: 08/24/23 1528   Admin Instructions:   LR should be paused and flushing of the line with NS is recommended prior to and after completion of ceftriaxone infusion due to incompatibility. Do not co-adminster with calcium-containing solutions.  Caution: Look alike/sound alike drug alert    1457-New Bag       1528-Stopped                 diatrizoate meglumine-sodium (GASTROGRAFIN) 66-10 % oral solution 240 mL  Dose: 240 mL  Freq: Once in Imaging Route: RE  Start: 08/24/23 1435   End: 08/24/23 1419   Admin Instructions:   Administer per local procedures per radiology exam.    1419-Given [C]                 iopamidol (ISOVUE-300) 61 % injection 100 mL  Dose: 100 mL  Freq: Once in Imaging Route: IV  Start: 08/29/23 0915   End: 08/29/23 0817         0817-Given            iopamidol (ISOVUE-300) 61 % injection 100 mL  Dose: 100 mL  Freq: Once in Imaging Route: IV  Start: 08/25/23 1215   End: 08/25/23 1119     1119-Given by Other                iopamidol (ISOVUE-370) 76 % injection 100 mL  Dose: 100 mL  Freq: Once in  "Imaging Route: IV  Start: 08/24/23 1140   End: 08/24/23 1124    1124-Given by Other                 ondansetron (ZOFRAN) injection 4 mg  Dose: 4 mg  Freq: Once Route: IV  Start: 08/24/23 1700   End: 08/24/23 1621   Admin Instructions:   \"If multiple N/V medications ordered, use in the following order: Ondansetron, Prochlorperazine, Promethazine. Use PO unless patient refuses or patient unable to swallow.\"      1621-Given                 piperacillin-tazobactam (ZOSYN) 3.375 g in iso-osmotic dextrose 50 ml (premix)  Dose: 3.375 g  Freq: Every 8 Hours Route: IV  Indications of Use: SEPSIS  Start: 08/25/23 0015   End: 08/30/23 0637   Admin Instructions:   Refrigerate     0044-New Bag       0945-New Bag       1345-Stopped       1609-New Bag       2010-Stopped        0006-New Bag       0437-Stopped       0956-New Bag       1558-New Bag       1938-Currently Infusing       2102-Stopped       2333-New Bag        0139-Currently Infusing       0430-Currently Infusing       0644-Stopped       0822-New Bag       1607-New Bag        0030-New Bag       0310-Currently Infusing       0430-Stopped       0815-New Bag       1729-New Bag        0021-New Bag       0221-Currently Infusing       0421-Stopped       0834-New Bag       1230-Stopped       1602-New Bag       2200-Stopped        0034-New Bag       0421-Currently Infusing       0637-Stopped           piperacillin-tazobactam (ZOSYN) 3.375 g in iso-osmotic dextrose 50 ml (premix)  Dose: 3.375 g  Freq: Once Route: IV  Start: 08/24/23 1815   End: 08/24/23 2000   Admin Instructions:   Refrigerate    1822-New Bag       2000-Stopped                 sodium chloride 0.9 % bolus 1,000 mL  Dose: 1,000 mL  Freq: Once Route: IV  Start: 08/24/23 1019   End: 08/24/23 1229    1021-New Bag       1229-Stopped                Discontinued Medications  Medications 08/24/23 08/25/23 08/26/23 08/27/23 08/28/23 08/29/23 08/30/23       sennosides-docusate (PERICOLACE) 8.6-50 MG per tablet 2 " tablet  Dose: 2 tablet  Freq: 2 Times Daily Route: PO  Start: 08/24/23 2100   End: 08/24/23 2036   Admin Instructions:   HOLD MEDICATION IF PATIENT HAS HAD BOWEL MOVEMENT. Start bowel management regimen if patient has not had a bowel movement after 12 hours.             And  polyethylene glycol (MIRALAX) packet 17 g  Dose: 17 g  Freq: Daily PRN Route: PO  PRN Reason: Constipation  PRN Comment: Use if senna-docusate is ineffective  Start: 08/24/23 1645   End: 08/24/23 2036   Admin Instructions:   Use if no bowel movement after 12 hours. Mix in 6-8 ounces of water.  Use 4-8 ounces of water, tea, or juice for each 17 gram dose.             And  bisacodyl (DULCOLAX) EC tablet 5 mg  Dose: 5 mg  Freq: Daily PRN Route: PO  PRN Reason: Constipation  PRN Comment: Use if polyethylene glycol is ineffective  Start: 08/24/23 1645   End: 08/24/23 2036   Admin Instructions:   Use if no bowel movement after 12 hours.  Swallow whole. Do not crush, split, or chew tablet.             And  bisacodyl (DULCOLAX) suppository 10 mg  Dose: 10 mg  Freq: Daily PRN Route: RE  PRN Reason: Constipation  PRN Comment: Use if bisacodyl oral is ineffective  Start: 08/24/23 1645   End: 08/24/23 2036   Admin Instructions:   Use if no bowel movement after 12 hours.  Hold for diarrhea              cefTRIAXone (ROCEPHIN) 2,000 mg in sodium chloride 0.9 % 100 mL IVPB-VTB  Dose: 2,000 mg  Freq: Every 24 Hours Route: IV  Indications of Use: PNEUMONIA  Start: 08/25/23 0900   End: 08/24/23 1725   Admin Instructions:   LR should be paused and flushing of the line with NS is recommended prior to and after completion of ceftriaxone infusion due to incompatibility. Do not co-adminster with calcium-containing solutions.  Caution: Look alike/sound alike drug alert              doxycycline (MONODOX) capsule 100 mg  Dose: 100 mg  Freq: Every 12 Hours Scheduled Route: PO  Indications of Use: PNEUMONIA  Start: 08/25/23 1030   End: 08/25/23 1059   Admin Instructions:    Take with food if GI upset occurs. Administer 2 hours before or 4 hours after administration of oral polyvalent cations (calcium, zinc, magnesium, iron)     (1056)-Not Given                doxycycline (VIBRAMYCIN) 100 mg in sodium chloride 0.9 % 100 mL IVPB-VTB  Dose: 100 mg  Freq: Every 12 Hours Route: IV  Indications of Use: PNEUMONIA  Start: 08/25/23 1200   End: 08/25/23 1318   Admin Instructions:   Protect from light.     (1322)-Not Given                doxycycline (VIBRAMYCIN) 100 mg in sodium chloride 0.9 % 100 mL IVPB-VTB  Dose: 100 mg  Freq: Every 12 Hours Route: IV  Indications of Use: PNEUMONIA  Start: 08/25/23 0800   End: 08/25/23 0936   Admin Instructions:   Protect from light.     (0945)-Not Given                HYDROcodone-acetaminophen (NORCO) 5-325 MG per tablet 1 tablet  Dose: 1 tablet  Freq: Every 4 Hours PRN Route: PO  PRN Reason: Moderate Pain  Start: 08/24/23 1646   End: 08/24/23 1744   Admin Instructions:   Based on patient request - if ordered for moderate or severe pain, provider allows for administration of a medication prescribed for a lower pain scale.  [MICHAEL]    Do not exceed 4 grams of acetaminophen in a 24 hr period. Max dose of 2gm for AST/ALT greater than 120 units/L        If given for pain, use the following pain scale:   Mild Pain = Pain Score of 1-3, CPOT 1-2  Moderate Pain = Pain Score of 4-6, CPOT 3-4  Severe Pain = Pain Score of 7-10, CPOT 5-8              ipratropium-albuterol (DUO-NEB) nebulizer solution 3 mL  Dose: 3 mL  Freq: 4 Times Daily - RT Route: NEBULIZATION  Start: 08/25/23 1630   End: 08/29/23 1403   Admin Instructions:   Include Respiratory Treatment Education     1554-Given       1913-Given        0818-Given       1120-Given       1459-Given       2046-Given        0829-Given       1213-Given       1602-Given       2027-Given        0714-Given       1059-Given       (1514)-Not Given [C]       1956-Given        0709-Given       1038-Given            lidocaine PF  1% (XYLOCAINE) injection  Freq: As Needed  Start: 08/28/23 1453   End: 08/28/23 1519        1453-Given [C]             sodium chloride 0.9 % flush 10 mL  Dose: 10 mL  Freq: As Needed Route: IV  PRN Reason: Line Care  Start: 08/28/23 1353   End: 08/28/23 1727              sodium chloride 0.9 % infusion 1,000 mL  Rate: 25 mL/hr Dose: 1000 mL  Freq: Continuous Route: IV  Start: 08/28/23 1355   End: 08/29/23 0641        1527-Stopped                        Blood Administration Record (From admission, onward)      None             Operative/Procedure Notes (all)        Procedures signed by Hemanth La MD at 08/28/23 1633   Version 1 of 1       Procedure Orders    1. BRONCHOSCOPY [[id number]] ordered by Hemanth La MD at 08/28/23 1435               [Media Unavailable] Scan on 8/28/2023 1435 by Hemanth La MD: BRONCH          Electronically signed by Hemanth La MD at 08/28/23 1633       Procedures signed by Hemanth La MD at 08/28/23 1633   Version 1 of 1       Procedure Orders    1. UPPER GI ENDOSCOPY [[id number]] ordered by Hemanth La MD at 08/28/23 1437               [Media Unavailable] Scan on 8/28/2023 1437 by Hemanth La MD: EGD          Electronically signed by Hemanth La MD at 08/28/23 1633       Hemanth La MD at 08/28/23 1451  Version 1 of 1         Operative Note     Date of procedure: 8/28/2023     Patient name: Wilda Mayo  MRN: [id number]    Pre OP diagnosis:  Right paraesophageal phlegmon.  Superior mediastinitis.  Hollow viscus perforation.  History of neck exploration.  Parainfluenza viral infection.  Community-acquired pneumonia.  Neck lipoma.  Asthma.    Post OP diagnosis:  Right paraesophageal phlegmon.  Superior mediastinitis.  Hollow viscus perforation.  History of neck exploration.  Parainfluenza viral infection.  Community-acquired pneumonia.  Neck lipoma.  Asthma.    Procedure performed:   Flexible esophagogastroduodenoscopy.  Flexible bronchoscopy.    Indications:   Wilda Iqbal  Gael is a 37-year-old female who does not have significant medical problems.  She was in her usual state of health until 4/10/2023 when she started having right chest pain radiating to her neck and scapular region. There was no precipitating events.  The pain progressively worsen and prompted her to come to the ER on 4/11/2023.  The pain was exacerbated with coughing and her anterior neck was tender to touch.  She denied fever, chills, rigors.  No prior report of similar pain.  She denied history of neck or chest surgery.  No prior instrumentation or endoscopic intervention.  She denied unintentional weight loss or diagnosis of cancer.  She denied smoking, vaping, drug abuse or alcohol intake in excess.     She works for [employer].  She moved to [place] in 2022 from [place].  She is originally from [place] and  her  2 years ago who is from [place].  They have 1 year kid who is healthy.  She is otherwise very active and independent in her activities of daily living. Since 2020, she was getting evaluation for asthma.  She is currently on fluticasone and albuterol inhaler.  She denied reflux symptoms.     In the ER, she was found to have mild leukocytosis and CT of the chest was obtained to rule out pulmonary embolism.  There was no evidence of pulmonary embolism but she was found to have abnormal gas collection to the right of the esophagus at the level of the thoracic inlet with surrounding soft tissue inflammation which extend into the posterior mediastinum adjacent to the esophagus.  This was concerning for esophageal perforation.    Esophagram did not show any evidence of esophageal perforation.  CT scan of the neck again demonstrated complex collection with air next to the posterior esophagus towards the right side.     Though she did not have evidence of esophageal perforation on esophagram.  I discussed her case with the radiologist. The CT scan findings were concerning for abscess in the deep cervical  space.  I discussed the findings with the patient and her  and recommended pan endoscopy and neck exploration which she underwent on 4/11/2023.  Intraoperatively, I did not find any evidence of tracheal cyst, injury, oropharyngeal abnormality, esophageal cyst, diverticulum or mucosal abnormality.  The tissue planes were edematous which was suggestive of deep space infection.  There were 3 x 2 cm mass lying anterior medial to the carotid artery at the level of the neck base which was resected.  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      She was discharged home after her white count normalized on antibiotics for a week. I recommended following up with CT scan of the neck and chest which was done on 5/5/2023 and reported resolution of previously noted inflammation in the neck and supra mediastinum.  There was 1.8 x 1.5 cm gas collection along the right posteriolateral aspect of the trachea suggestive of tracheal diverticulum.     She was visiting her family in [place] in mid August, 2023.  One of her way back during flight, she had blocked ears and did Valsalva to unclog her ear.  She started having similar right-sided neck pain which progressively got worse.  Her son was sick with an upper respiratory infection and she also had dry cough.  She came to the ED and was found to have a WBC of 16 K.  CT scan of the neck showed 3.1 x 2.6 cm complex collection containing air in the superior right paraesophageal region.  She was started on broad-spectrum antibiotic.  Her WBC continues to improve.  I believed that she might have a small perforation that sealed by itself.  I recommended flexible bronchoscopy and esophagogastroduodenoscopy to assess for upper airway or esophageal perforation.       Surgeon: Hemanth aL MD     Anesthesia: Monitored Local Anesthesia with Sedation    ASA Class: 2    Procedure Details   On 8/28/2023, the patient was brought to the endoscopy suite on a fluoroscopy bed. Wilda Mayo was  positioned in the left lateral decubitus position.  A bite-block was placed.  Prophylactic intravenous antibiotics were not indicated.  Prior to beginning the procedure, a time-out was conducted during which all members of the surgical team were present.      Following the administration of adequate intravenous sedation, I began by performing a flexible esophagogastroduodenoscopy.   A flexible adult endoscope was placed into the oropharynx.  The piriform recess were examined.  The base of the tongue, epiglottis and vocal cords were examined.  There was no hyperemia or any other evidence of mucosal injury.  There were submucosal lymphoid tissue enlargement along the tonsillar bed and posterior pharyngeal wall.  The endoscope was advanced down the proximal esophagus under direct vision without difficulty.  The cricopharyngeus was located 16 cm.  The proximal and mid thoracic esophagus appeared.  The Z-line was located at 40 cm.  The diaphragmatic pinch was at 38 cm.  There was no hiatal hernia.  There was no esophagitis, Corona's esophagus or other mucosal abnormalities.  The endoscope was advanced into the stomach and retroflexed.  A type I Hill valve was noted.  The stomach appeared normal. The pylorus and 1st and 2nd portions of the duodenum appeared normal.  The air was evacuated from duodenum, stomach and esophagus.    I then began by performing flexible bronchoscopy.  A flexible adult bronchoscope was advanced through the oropharynx.  A complete examination of the distal trachea and bilateral mainstem and lobar bronchi and all segmental bronchial orifices was performed.  The patient has normal endobronchial anatomy.  There was no blood, pus, endoluminal lesions or other abnormal findings.  There is no evidence of tracheal cyst.  The bronchoscope was removed.    The patient was awakened from sedation and was transported to the post-anesthesia care unit in stable condition.    Findings:  Normal bronchoscopic and  upper endoscopic examination.  The oropharyngeal lymphoid tissue were enlarged giving appearance of cobblestone which could be related to recent viral illness.  The piriform recess, epiglottic fold, oropharynx, cervical esophagus, thoracic esophagus, stomach and duodenum were examined and there were no evidence of mucosal injury.  The vocal cord were functioning normal.  The larynx, subglottic trachea and trachea had no evidence of cyst or mucosal abnormality.    Estimated Blood Loss:  none           Specimens:   None           Complications: None           Disposition: PACU - hemodynamically stable.           Condition: stable    Post-procedure recommendations:   Can resume soft diet today.  NPO from midnight.  Plan for CT scan of the neck tomorrow morning.  If there is persistent or worsening inflammation/infection, we will plan for neck reexploration.     Hemanth La MD   Thoracic Surgeon  [facility] [place]       Electronically signed by Hemanth La MD at 08/28/23 8111          Physician Progress Notes (all)        Yoan Giraldo MD at 08/30/23 0916          ID NOTE    CC: f/u Parainfluenza 4 and paraesophageal mass    Subj: No fever. Cough better.     Objective   Vital Signs   Temp:  [97.3 °F (36.3 °C)-98.2 °F (36.8 °C)] 98.2 °F (36.8 °C)  Heart Rate:  [64-96] 88  Resp:  [16-20] 18  BP: ()/(66-79) 99/66    Physical Exam:   General: awake, alert, NAD, very nice  Eyes: no scleral icterus  Neck: healed incision w/o erythema or drainage  Cardiovascular: NR, no LE edema  Respiratory: BLL rales resolved; dry cough is better  Skin: No rashes  Neurological: Alert and oriented x 3  Psychiatric: calm and pleasant    Labs:   CBC, BMP, and blood cultures reviewed today  Lab Results   Component Value Date    WBC 7.41 08/30/2023    HGB 11.7 (L) 08/30/2023    HCT 35.1 08/30/2023    MCV 87.8 08/30/2023     (H) 08/30/2023     Lab Results   Component Value Date    GLUCOSE 90 08/30/2023  "   CALCIUM 9.0 08/30/2023     08/30/2023    K 3.9 08/30/2023    CO2 25.0 08/30/2023     08/30/2023    BUN 7 08/30/2023    CREATININE 0.63 08/30/2023    EGFR 118.8 08/30/2023    BCR 11.1 08/30/2023    ANIONGAP 10.0 08/30/2023     No results found for: CRP    HIV negative  HCG negative  Procal 0.08    Microbiology:  8/24 COVID: negative  8/24 BCx: negative (final)  8/25 RPP + paraflu 4    New Radiology:  CT Neck (8/29): \"1.  There is a persistent soft tissue mass at the level of T1 and T2 and   to the right of the trachea which has decreased slightly in size as   compared to 08/25/2023. The air centrally has decreased in volume. This   may represent a phlegmonous collection/abscess involving a pre-existing   paratracheal cyst. An underlying neoplastic process or mass cannot be   excluded. Continued surveillance is recommended.   2.  Small bilateral pleural fluid collections are appreciated which are   new versus 08/25/2023. \"    ASSESSMENT/PLAN:  Right paraesophageal fluid collection with mass effect  Bilateral lower lobe infiltrates  Fever in adult - resolved  Parainfluenza 4 infection with sinusitis    She remains afebrile and her WBC has normalized. Her symptoms from parainfluenza 4 appear resolved except for a nagging dry cough. I told her this can even take a week or two to resolve.     Yesterday's CT report and CT surgery note reviewed. Plans for follow-up with them in 1 month w/ repeat CT scan. I have changed her Zosyn to Augmentin 875-125 mg PO BID through 9/29/23 which will be a 4-5 week total course. I have scheduled her to see me the day following her appt w/ Dr La.     Thank you for allowing me to be involved in the care of this patient. Infectious diseases will sign off at this time with antibiotics plan in place, but please call me at [phone] if any further ID questions or new ID concerns.          Electronically signed by Yoan Giraldo MD at 08/30/23 0965       Sean, " MD Juan at 23 1654              Name: iWlda Mayo ADMIT: 2023   : 1985  PCP: Provider, No Known    MRN: [id number] LOS: 0 days   AGE/SEX: 37 y.o. female  ROOM: [facility]     Subjective   Subjective       Patient is seen at bedside, no new complaints.      Objective   Objective   Vital Signs  Temp:  [97.7 °F (36.5 °C)-98.2 °F (36.8 °C)] 97.9 °F (36.6 °C)  Heart Rate:  [69-96] 96  Resp:  [16-20] 20  BP: (102-111)/(57-79) 104/79  SpO2:  [93 %-100 %] 100 %  on   ;   Device (Oxygen Therapy): room air  Body mass index is 21.26 kg/m².  Physical Exam  General, awake and alert.  Head and ENT, normocephalic and atraumatic.  Lungs, symmetric expansion, equal air entry bilaterally.  Heart, regular rate and rhythm.  Abdomen, soft and nontender.  Extremities, no clubbing or cyanosis.  Neuro, no focal deficits.  Skin: Warm and no rash.  Psych, normal mood and affect.  Musculoskeletal, joint examination is grossly normal.    Copied text material from yesterday's note has been reviewed for appropriate changes and remains accurate as of 23.      Results Review     I reviewed the patient's new clinical results.  Results from last 7 days   Lab Units 23  0629   WBC 10*3/mm3 7.12   HEMOGLOBIN g/dL 10.5*   PLATELETS 10*3/mm3 409     Results from last 7 days   Lab Units 23  0629   SODIUM mmol/L 140   POTASSIUM mmol/L 3.9   CHLORIDE mmol/L 108*   CO2 mmol/L 25.0   BUN mg/dL 6   CREATININE mg/dL 0.65   GLUCOSE mg/dL 88   EGFR mL/min/1.73 117.9     Results from last 7 days   Lab Units 23  0629   ALBUMIN g/dL 3.1*   BILIRUBIN mg/dL 0.2   ALK PHOS U/L 171*   AST (SGOT) U/L 21   ALT (SGPT) U/L 34*     Results from last 7 days   Lab Units 23  0629   CALCIUM mg/dL 8.9   ALBUMIN g/dL 3.1*       CT Soft Tissue Neck With Contrast    Result Date: 2023  1.  There is a persistent soft tissue mass at the level of T1 and T2 and to the right of the trachea which has decreased slightly in size  as compared to 08/25/2023. The air centrally has decreased in volume. This may represent a phlegmonous collection/abscess involving a pre-existing paratracheal cyst. An underlying neoplastic process or mass cannot be excluded. Continued surveillance is recommended. 2.  Small bilateral pleural fluid collections are appreciated which are new versus 08/25/2023.   Radiation dose reduction techniques were utilized, including automated exposure control and exposure modulation based on body size.   This report was finalized on 8/29/2023 4:50 PM by Dr. Reza Polk M.D.       I have personally reviewed all medications:  Scheduled Medications  [START ON 8/30/2023] amoxicillin-clavulanate, 1 tablet, Oral, Q12H  piperacillin-tazobactam, 3.375 g, Intravenous, Q8H  sodium chloride, 10 mL, Intravenous, Q12H    Infusions  sodium chloride, 50 mL/hr, Last Rate: 50 mL/hr (08/29/23 1241)    Diet  Diet: Regular/House Diet; Texture: Mechanical Ground (NDD 2); Fluid Consistency: Thin (IDDSI 0)    I have personally reviewed:  [x]  Laboratory   [x]  Microbiology   [x]  Radiology   [x]  EKG/Telemetry  [x]  Cardiology/Vascular   []  Pathology    []  Records      Assessment/Plan     Active Hospital Problems    Diagnosis  POA    **Pneumonia [J18.9]  Yes    Infection due to parainfluenza virus 4 [B34.8]  Yes    Neck pain [M54.2]  Yes    Asthma [J45.909]  Yes    Mediastinitis [J98.51]  Unknown    Paratracheal gas collection [R93.89]  Yes      Resolved Hospital Problems   No resolved problems to display.       37 y.o. female admitted with Pneumonia.    Assessment and plan  1.  Complex collection/mass containing air in the right superior paraesophageal region.  There is mass effect on trachea and esophagus on the left.  Thoracic surgery consulted for further input and management. RPP positive for parainfluenza 4 and this probably accounts for her most recent symptoms. She has a sick child at home so that is the likely contact. This is a  "self-limited illness.  She underwent bronchoscopy and EGD.  No plans for surgical intervention.  Follow ID recommendations for antibiotics upon discharge.     2.  Pneumonia, pulmonary and infectious disease on board.      3.  Paraesophageal mass, keep patient n.p.o. with no sips, ice chips or oral meds.  Follow consultant management recommendations.     4.  History of asthma, chronic condition, does not appear to be in exacerbation.     5.  CODE STATUS is full code.  Further plans based on hospital course.            Juan Estrada MD  [facility]  08/29/23  16:54 EDT      Electronically signed by Juan Estrada MD at 08/29/23 1659       Reji Pinzon MD at 08/29/23 1402                                                        LOS: 0 days   Patient Care Team:  Provider, No Known as PCP - General    Chief Complaint:  F/up parainfluenza infection, abnormal CT chest    Subjective   Interval History  Noted normal bronch and EGD done yesterday on 8/28.  Patient continues to have some sinus congestion and mild cough but no significant phlegm.    REVIEW OF SYSTEMS:   CARDIOVASCULAR: No chest pain, chest pressure or chest discomfort. No palpitations or edema.   RESPIRATORY: No shortness of breath but cough and sinus congestion  GASTROINTESTINAL: No anorexia, nausea, vomiting or diarrhea. No abdominal pain.  CONSTITUTIONAL: No fever or chills.     Ventilator/Non-Invasive Ventilation Settings (From admission, onward)      None                  Physical Exam:     Vital Signs  Temp:  [97.7 °F (36.5 °C)-98.2 °F (36.8 °C)] 98.2 °F (36.8 °C)  Heart Rate:  [] 87  Resp:  [12-20] 20  BP: ()/(57-74) 102/57    Intake/Output Summary (Last 24 hours) at 8/29/2023 1402  Last data filed at 8/29/2023 0834  Gross per 24 hour   Intake 800 ml   Output --   Net 800 ml       Flowsheet Rows      Flowsheet Row First Filed Value   Admission Height 175.3 cm (69\") Documented at 08/24/2023 0927   Admission Weight " 68.5 kg (151 lb) Documented at 08/24/2023 0927            PPE used per hospital policy    General Appearance:   Alert, cooperative, in no acute distress   ENMT:  Mallampati score 2, moist mucous membrane   Eyes:  Pupils equal and reactive to light. EOMI   Neck:    Trachea midline. No thyromegaly.   Lungs:   Good and equal air entry bilaterally.  No audible crackles or wheezing.    Heart:   Regular rhythm and normal rate, normal S1 and S2, no         murmur   Skin:   No rash or ecchymosis   Abdomen:     Soft. No tenderness. No HSM.   Neuro/psych:  Conscious, alert, oriented x3. Strength 5/5 in upper and lower  ext.  Appropriate mood and affect   Extremities:  No cyanosis, clubbing or edema.  Warm extremities and well-perfused                Diagnostic imaging:  I personally and independently reviewed the following images:  CT chest 8/24/2023: Upper paraesophageal mass (not included below).  Small pulmonary infiltrates bilaterally especially at the bases as shown below.      Assessment     Aspiration pneumonia +/- pneumonitis  Parainfluenza infection and sinusitis  Right paraesophageal mass fluid collection  Leukocytosis  History of mediastinitis  History of asthma        Plan       DuoNeb 4 times a day  Incentive spirometry  Change DuoNeb to as needed only instead of scheduled  Antibiotics with Zosyn for pneumonia/paraesophageal infection.  ID managing.  Noted plan for follow-up CT neck by thoracic surgery as outpatient      Reji Pinzon MD  08/29/23  14:02 EDT            This note was dictated utilizing Dragon dictation     Electronically signed by Reji Pinzon MD at 08/29/23 1403       Fox Quiles APRN at 08/29/23 1331              Chief Complaint: Right paraesophageal phlegmon, mediastinitis.  S/P: EGD, bronchoscopy  POD #: 1    Subjective:  Symptoms:  Stable.  No shortness of breath, cough or chest pain.    Diet:  Adequate intake.  No nausea or vomiting.    Activity level: Normal.    Pain:  She reports  "no pain.      Vital Signs:  Temp:  [97.7 °F (36.5 °C)-98.2 °F (36.8 °C)] 98.2 °F (36.8 °C)  Heart Rate:  [] 87  Resp:  [12-20] 20  BP: ()/(57-74) 102/57    Intake & Output (last day)         08/28 0701  08/29 0700 08/29 0701  08/30 0700    P.O.  0    I.V. (mL/kg) 800 (12.3)     Total Intake(mL/kg) 800 (12.3) 0 (0)    Net +800 0                  Objective:  General Appearance:  Comfortable, well-appearing and in no acute distress.    Vital signs: (most recent): Blood pressure 102/57, pulse 87, temperature 98.2 °F (36.8 °C), temperature source Oral, resp. rate 20, height 175.3 cm (69\"), weight 65.3 kg (143 lb 15.4 oz), last menstrual period 08/17/2023, SpO2 99 %.    Lungs:  Normal effort and normal respiratory rate.  She is not in respiratory distress.    Heart: Normal rate.  Regular rhythm.    Chest: Symmetric chest wall expansion. No chest wall tenderness.    Abdomen: Abdomen is soft and non-distended.    Neurological: Patient is alert and oriented to person, place and time.    Skin:  Warm and dry.                Results Review:     I reviewed the patient's new clinical results.  I reviewed the patient's new imaging results and agree with the interpretation.  Discussed with patient, nurse, and surgeon.    Imaging Results (Last 24 Hours)       Procedure Component Value Units Date/Time    CT Soft Tissue Neck With Contrast [[id number]] Collected: 08/29/23 0932     Updated: 08/29/23 0933    Narrative:      CT NECK WITH CONTRAST     HISTORY: Abscess.     COMPARISON: Comparison is made to the CT examinations of the neck  performed on 08/25/2023, 05/05/2023 and 04/11/2023.     FINDINGS: There is complete opacification of the frontal recesses, of  the ethmoid air cells and moderate opacification of the maxillary  sinuses and of the sphenoid sinus. This appears similar to examination  of 08/25/2023.     Small bilateral pleural fluid collections are appreciated which are new  versus 08/25/2023.     A soft tissue " mass is present to the right of the esophagus at the level  of T1 and extending inferiorly to the level of T2/T3. This measures  approximately 2.2 cm in the transverse dimension, 1.1 cm in the AP  dimension and approximately 3.2 cm in the craniocaudal dimension. A  small collection of air is present medially measuring 2 mm in size. The  soft tissue mass has decreased in size as compared to 08/25/2023 and the  collection of air has decreased in size. The soft tissue mass measures  approximately 2.8 cm in transverse dimension, 2.7 cm in the AP dimension  and approximately 3.3 cm in the craniocaudal dimension.     There is no evidence of pathologic adenopathy.     The parotid, submandibular and thyroid glands appear unremarkable. The  trachea is deviated slightly to the left of midline and anteriorly which  was the case previously. There is no evidence of airway compromise.       Impression:      1.  There is a persistent soft tissue mass at the level of T1 and T2 and  to the right of the trachea which has decreased slightly in size as  compared to 08/25/2023. The air centrally has decreased in volume. This  may represent a phlegmonous collection/abscess involving a pre-existing  paratracheal cyst. Continued surveillance is recommended.  2.  Small bilateral pleural fluid collections are appreciated which are  new versus 08/25/2023.        Radiation dose reduction techniques were utilized, including automated  exposure control and exposure modulation based on body size.                  Lab Results:     Lab Results (last 24 hours)       Procedure Component Value Units Date/Time    Comprehensive Metabolic Panel [[id number]]  (Abnormal) Collected: 08/29/23 0629    Specimen: Blood Updated: 08/29/23 0739     Glucose 88 mg/dL      BUN 6 mg/dL      Creatinine 0.65 mg/dL      Sodium 140 mmol/L      Potassium 3.9 mmol/L      Chloride 108 mmol/L      CO2 25.0 mmol/L      Calcium 8.9 mg/dL      Total Protein 6.0 g/dL      Albumin  3.1 g/dL      ALT (SGPT) 34 U/L      AST (SGOT) 21 U/L      Alkaline Phosphatase 171 U/L      Total Bilirubin 0.2 mg/dL      Globulin 2.9 gm/dL      A/G Ratio 1.1 g/dL      BUN/Creatinine Ratio 9.2     Anion Gap 7.0 mmol/L      eGFR 117.9 mL/min/1.73     Narrative:      CBC & Differential [[id number]]  (Abnormal) Collected: 08/29/23 0629    Specimen: Blood Updated: 08/29/23 0719    Narrative:      CBC Auto Differential [[id number]]  (Abnormal) Collected: 08/29/23 0629    Specimen: Blood Updated: 08/29/23 0719     WBC 7.12 10*3/mm3      RBC 3.50 10*6/mm3      Hemoglobin 10.5 g/dL      Hematocrit 31.5 %      MCV 90.0 fL      MCH 30.0 pg      MCHC 33.3 g/dL      RDW 12.2 %      RDW-SD 39.9 fl      MPV 9.4 fL      Platelets 409 10*3/mm3      Neutrophil % 43.5 %      Lymphocyte % 33.6 %      Monocyte % 8.4 %      Eosinophil % 13.5 %      Basophil % 0.7 %      Immature Grans % 0.3 %      Neutrophils, Absolute 3.10 10*3/mm3      Lymphocytes, Absolute 2.39 10*3/mm3      Monocytes, Absolute 0.60 10*3/mm3      Eosinophils, Absolute 0.96 10*3/mm3      Basophils, Absolute 0.05 10*3/mm3      Immature Grans, Absolute 0.02 10*3/mm3      nRBC 0.0 /100 WBC           Assessment & Plan       Pneumonia    Paratracheal gas collection    Mediastinitis    Asthma    Neck pain    Infection due to parainfluenza virus 4       Assessment & Plan    S/p bronch and EGD yesterday which demonstrated no evidence of mucosal injury.  Oropharyngeal lymphoid tissue enlargement with cobblestone appearance likely secondary to infectious etiology.  Follow-up contrasted CT soft tissue neck demonstrates a persistent soft tissue mass at the level of T1 and T2 into the right of the trachea which appears decreased in size slightly along with when compared to imaging performed on 8/25/2023.  The air centrally has decreased in volume.  This may be representative of a phlegmonous collection/abscess. WBC 7.1, afebrile.  Recommend continuation of antibiotics per  infectious disease recommendation.  Plan for her to follow-up with Dr. La in the office in 1 month with a follow-up contrasted CT of the neck.  This was discussed with her in detail utilizing iPad .    ANNETTE Watters  [facility]  08/29/23  13:32 EDT            Electronically signed by Fox Quiles APRN at 08/29/23 1350       Yoan Giraldo MD at 08/29/23 0823          ID NOTE    CC: f/u Parainfluenza 4 and paraesophageal mass    Subj: No fever. Still w/ dry cough. EGD and bronch yesterday were unrevealing. CT done this AM and possibly OR tomorrow depending on the results.         Objective   Vital Signs   Temp:  [97.7 °F (36.5 °C)-98.3 °F (36.8 °C)] 98.2 °F (36.8 °C)  Heart Rate:  [] 86  Resp:  [12-20] 20  BP: ()/(57-74) 102/57    Physical Exam:   General: awake, alert, NAD, very nice  Eyes: no scleral icterus  Neck: healed incision  Cardiovascular: NR  Respiratory: BLL rales resolved; dry cough   Skin: No rashes  Neurological: Alert and oriented x 3  Psychiatric: Normal mood and affect     Labs:   CBC, BMP, and blood cultures reviewed today  Lab Results   Component Value Date    WBC 7.12 08/29/2023    HGB 10.5 (L) 08/29/2023    HCT 31.5 (L) 08/29/2023    MCV 90.0 08/29/2023     08/29/2023     Lab Results   Component Value Date    GLUCOSE 88 08/29/2023    CALCIUM 8.9 08/29/2023     08/29/2023    K 3.9 08/29/2023    CO2 25.0 08/29/2023     (H) 08/29/2023    BUN 6 08/29/2023    CREATININE 0.65 08/29/2023    EGFR 117.9 08/29/2023    BCR 9.2 08/29/2023    ANIONGAP 7.0 08/29/2023     HIV negative  HCG negative  Procal 0.08    Microbiology:  8/24 COVID: negative  8/24 BCx: negative (final)  8/25 RPP + paraflu 4    New Radiology:  CT Neck (8/29): pending    Prior Radiology:  CT Neck (8/25):   1. Moderate size right periesophageal soft tissue mass at the cervicothoracic junction as described. This mass contains a small amount of air and  is unchanged from yesterday's study. This could be an inflammatory or neoplastic mass.   2. No esophageal perforation was seen on yesterday's esophagram.   3. No pathologic lymphadenopathy or other neck masses are seen.   4. Relatively extensive sinusitis.     ASSESSMENT/PLAN:  Right paraesophageal fluid collection with mass effect  Bilateral lower lobe infiltrates  Fever in adult - resolved  Diarrhea  Parainfluenza 4 infection with sinusitis    She remains afebrile and her WBC has normalized. For parainfluenza 4, there is no specific treatment as this is a  self-limited illness. It is already significantly improved.     Re: paraesophageal fluid collection, the EGD and bronchoscopy were normal. She had a CT neck done this AM and might possibly go to the OR tomorrow for re-exploration depending on the results.      I will continue empiric Zosyn with duration TBD.     ID will follow.     ADDENDUM: CT report and CT surgery note reviewed. Plans for follow-up in 1 month w/ repeat scan. I will keep her on Zosyn today and then tomorrow have her transition to Augmentin 875-125 mg PO BID through 23. I will schedule her to see me following her appt w/ Dr La.         Electronically signed by Yoan Giraldo MD at 23 1441       Juan Estrada MD at 23 2431              Name: Wilda Mayo ADMIT: 2023   : 1985  PCP: Provider, No Known    MRN: [id number] LOS: 0 days   AGE/SEX: 37 y.o. female  ROOM: [facility]     Subjective   Subjective   Patient is seen at bedside, no new complaints.      Objective   Objective   Vital Signs  Temp:  [97.7 °F (36.5 °C)-98.3 °F (36.8 °C)] 98.3 °F (36.8 °C)  Heart Rate:  [] 91  Resp:  [12-18] 16  BP: ()/(62-74) 110/67  SpO2:  [96 %-100 %] 99 %  on   ;   Device (Oxygen Therapy): room air  Body mass index is 21.26 kg/m².  Physical Exam  General, awake and alert.  Head and ENT, normocephalic and atraumatic.  Neck scar noted  Lungs, symmetric  expansion, equal air entry bilaterally.  Heart, regular rate and rhythm.  Abdomen, soft and nontender.  Extremities, no clubbing or cyanosis.  Neuro, no focal deficits.  Skin: Warm and no rash.  Psych, normal mood and affect.  Musculoskeletal, joint examination is grossly normal.          Copied text material from yesterday's note has been reviewed for appropriate changes and remains accurate as of 8/28/23.         Results Review     I reviewed the patient's new clinical results.  Results from last 7 days   Lab Units 08/28/23  0545 08/27/23  0617 08/26/23  0520 08/25/23  0724   WBC 10*3/mm3 6.54 7.32 11.05* 17.68*   HEMOGLOBIN g/dL 9.6* 9.5* 10.3* 11.3*   PLATELETS 10*3/mm3 364 334 333 346     Results from last 7 days   Lab Units 08/28/23  0545 08/27/23  0617 08/26/23  0520 08/25/23  0724   SODIUM mmol/L 138 139 139 142   POTASSIUM mmol/L 3.6 3.7 3.7 3.6   CHLORIDE mmol/L 105 106 106 108*   CO2 mmol/L 25.4 25.0 23.0 20.3*   BUN mg/dL 4* 5* 8 8   CREATININE mg/dL 0.54* 0.56* 0.54* 0.64   GLUCOSE mg/dL 89 95 77 84   EGFR mL/min/1.73 123.3 122.2 123.3 118.4     Results from last 7 days   Lab Units 08/28/23  0545 08/26/23  0520 08/25/23  0724   ALBUMIN g/dL 2.9* 3.0* 3.2*   BILIRUBIN mg/dL 0.3 0.5 0.4   ALK PHOS U/L 148* 129* 78   AST (SGOT) U/L 25 19 12   ALT (SGPT) U/L 33 26 28     Results from last 7 days   Lab Units 08/28/23  0545 08/27/23  0617 08/26/23  0520 08/25/23  0724   CALCIUM mg/dL 8.4* 8.2* 8.1* 8.4*   ALBUMIN g/dL 2.9*  --  3.0* 3.2*       Diet  NPO Diet NPO Type: Strict NPO  Diet: Gastrointestinal Diets; Fiber-Restricted; Texture: Regular Texture (IDDSI 7); Fluid Consistency: Thin (IDDSI 0)      Assessment/Plan     Active Hospital Problems    Diagnosis  POA    **Pneumonia [J18.9]  Yes    Infection due to parainfluenza virus 4 [B34.8]  Yes    Neck pain [M54.2]  Yes    Asthma [J45.909]  Yes    Paratracheal gas collection [R93.89]  Yes      Resolved Hospital Problems   No resolved problems to display.        37 y.o. female admitted with Pneumonia.    Assessment and plan  1.  Complex collection/mass containing air in the right superior paraesophageal region.  There is mass effect on trachea and esophagus on the left.  Thoracic surgery consulted for further input and management. RPP positive for parainfluenza 4 and this probably accounts for her most recent symptoms. She has a sick child at home so that is the likely contact. This is a self-limited illness.  She underwent bronchoscopy and EGD.     2.  Pneumonia, pulmonary and infectious disease on board.      3.  Paraesophageal mass, keep patient n.p.o. with no sips, ice chips or oral meds.  Follow consultant management recommendations.     4.  History of asthma, chronic condition, does not appear to be in exacerbation.     5.  CODE STATUS is full code.  Further plans based on hospital course.            Juan Estrada MD  [facility]  08/28/23  18:53 EDT      Electronically signed by Juan Estrada MD at 08/28/23 1855       Yoan Giraldo MD at 08/28/23 0907          ID NOTE    CC: f/u fever    Subj: No fever. Dry cough a little more bothersome today. Going for procedure later this AM. Tolerating Zosyn w/o rash.     Me  Objective   Vital Signs   Temp:  [97.7 °F (36.5 °C)-98.4 °F (36.9 °C)] 97.7 °F (36.5 °C)  Heart Rate:  [] 90  Resp:  [16-18] 16  BP: (103-107)/(58-73) 107/62    Physical Exam:   General: awake, alert, NAD, very nice  Eyes: no scleral icterus  Neck: healed incision  Cardiovascular: NR  Respiratory: BLL rales improved; dry cough   Skin: No rashes  Neurological: Alert and oriented x 3  Psychiatric: Normal mood and affect     Labs:   CBC, BMP, and blood cultures reviewed today  Lab Results   Component Value Date    WBC 6.54 08/28/2023    HGB 9.6 (L) 08/28/2023    HCT 29.2 (L) 08/28/2023    MCV 90.4 08/28/2023     08/28/2023     Lab Results   Component Value Date    GLUCOSE 89 08/28/2023    CALCIUM 8.4  (L) 08/28/2023     08/28/2023    K 3.6 08/28/2023    CO2 25.4 08/28/2023     08/28/2023    BUN 4 (L) 08/28/2023    CREATININE 0.54 (L) 08/28/2023    EGFR 123.3 08/28/2023    BCR 7.4 08/28/2023    ANIONGAP 7.6 08/28/2023     HIV negative  HCG negative  Procal 0.08    Microbiology:  8/24 COVID: negative  8/24 BCx: NGTD  8/25 RPP + paraflu 4    Prior Radiology:  CT Neck:   1. Moderate size right periesophageal soft tissue mass at the cervicothoracic junction as described. This mass contains a small amount of air and is unchanged from yesterday's study. This could be an inflammatory or neoplastic mass.   2. No esophageal perforation was seen on yesterday's esophagram.   3. No pathologic lymphadenopathy or other neck masses are seen.   4. Relatively extensive sinusitis.     ASSESSMENT/PLAN:  Right paraesophageal fluid collection with mass effect  Bilateral lower lobe infiltrates  Fever in adult - resolved  Diarrhea  Parainfluenza 4 infection with sinusitis    She is afebrile and her WBC has normalized. For parainfluenza 4, there is no specific treatment as this is a   self-limited illness. It is already significantly improved.     Re: paraesophageal fluid collection, the plan is for bronch and EGD today. I will continue empiric Zosyn with duration TBD. I'll follow-up the procedural findings before making a final plan.     ID will follow.             Electronically signed by Yoan Giraldo MD at 08/28/23 0909       Reji Pinzon MD at 08/28/23 0827                                                        LOS: 0 days   Patient Care Team:  Provider, No Known as PCP - General    Chief Complaint:  F/up parainfluenza infection, abnormal CT chest    Subjective   Interval History  I reviewed the admission note, progress notes, PMH, PSH, Family hx, social history, imagings and prior records on this admission, summarized the finding in my note and formulated a transition of care plan.      She has mild  "cough with sputum production.  No shortness of breath at rest.  On RA.    REVIEW OF SYSTEMS:   CARDIOVASCULAR: No chest pain, chest pressure or chest discomfort. No palpitations or edema.   RESPIRATORY: No shortness of breath but cough and sinus congestion  GASTROINTESTINAL: No anorexia, nausea, vomiting or diarrhea. No abdominal pain.  CONSTITUTIONAL: No fever or chills.     Ventilator/Non-Invasive Ventilation Settings (From admission, onward)      None                  Physical Exam:     Vital Signs  Temp:  [97.7 °F (36.5 °C)-98.4 °F (36.9 °C)] 97.7 °F (36.5 °C)  Heart Rate:  [] 90  Resp:  [16-18] 16  BP: (103-107)/(58-73) 107/62    Intake/Output Summary (Last 24 hours) at 8/28/2023 0827  Last data filed at 8/27/2023 1340  Gross per 24 hour   Intake 210 ml   Output --   Net 210 ml     Flowsheet Rows      Flowsheet Row First Filed Value   Admission Height 175.3 cm (69\") Documented at 08/24/2023 0927   Admission Weight 68.5 kg (151 lb) Documented at 08/24/2023 0927            PPE used per hospital policy    General Appearance:   Alert, cooperative, in no acute distress   ENMT:  Mallampati score 2, moist mucous membrane   Eyes:  Pupils equal and reactive to light. EOMI   Neck:    Trachea midline. No thyromegaly.   Lungs:    Clear to auscultation,respirations regular, even and nonlabored    Heart:   Regular rhythm and normal rate, normal S1 and S2, no         murmur   Skin:   No rash or ecchymosis   Abdomen:     Soft. No tenderness. No HSM.   Neuro/psych:  Conscious, alert, oriented x3. Strength 5/5 in upper and lower  ext.  Appropriate mood and affect   Extremities:  No cyanosis, clubbing or edema.  Warm extremities and well-perfused          Results Review:        Results from last 7 days   Lab Units 08/28/23  0545   SODIUM mmol/L 138   POTASSIUM mmol/L 3.6   CHLORIDE mmol/L 105   CO2 mmol/L 25.4   BUN mg/dL 4*   CREATININE mg/dL 0.54*   GLUCOSE mg/dL 89   CALCIUM mg/dL 8.4*       Results from last 7 days "   Lab Units 23  0545   WBC 10*3/mm3 6.54   HEMOGLOBIN g/dL 9.6*   HEMATOCRIT % 29.2*   PLATELETS 10*3/mm3 364               Diagnostic imaging:  I personally and independently reviewed the following images:  CT chest 2023: Upper paraesophageal mass (not included below).  Small pulmonary infiltrates bilaterally especially at the bases as shown below.      Assessment     Aspiration pneumonia +/- pneumonitis  Parainfluenza infection and sinusitis  Right paraesophageal mass fluid collection  Leukocytosis  History of mediastinitis  History of asthma    All problems new to me    Plan       DuoNeb 4 times a day  Incentive spirometry  Antibiotics with Zosyn for pneumonia.  ID managing.  Noted plan for bronchoscopy and EGD per thoracic surgery      Reji Pinzon MD  23  08:27 EDT            This note was dictated utilizing Dragon dictation     Electronically signed by Reji Pinzon MD at 23 0956       Juan Estrada MD at 23 2217              Name: Wilda Mayo ADMIT: 2023   : 1985  PCP: Provider, No Known    MRN: [id number] LOS: 0 days   AGE/SEX: 37 y.o. female  ROOM: [facility]     Subjective   Subjective      Patient is seen at bedside, no new complaints.      Objective   Objective   Vital Signs  Temp:  [97.9 °F (36.6 °C)-98.4 °F (36.9 °C)] 98.4 °F (36.9 °C)  Heart Rate:  [] 106  Resp:  [18] 18  BP: ()/(58-67) 103/58  SpO2:  [97 %-100 %] 100 %  on   ;   Device (Oxygen Therapy): room air  Body mass index is 21.26 kg/m².  Physical Exam    General, awake and alert.  Head and ENT, normocephalic and atraumatic.  Neck scar noted  Lungs, symmetric expansion, equal air entry bilaterally.  Heart, regular rate and rhythm.  Abdomen, soft and nontender.  Extremities, no clubbing or cyanosis.  Neuro, no focal deficits.  Skin: Warm and no rash.  Psych, normal mood and affect.  Musculoskeletal, joint examination is grossly normal.        Copied text material from yesterday's  note has been reviewed for appropriate changes and remains accurate as of 8/27/23.          Infusions  sodium chloride, 50 mL/hr, Last Rate: 50 mL/hr (08/26/23 0821)    Diet  NPO Diet NPO Type: Sips with Meds  Diet: Regular/House Diet; Texture: Mechanical Ground (NDD 2); Fluid Consistency: Thin (IDDSI 0)      Assessment/Plan     Active Hospital Problems    Diagnosis  POA    **Pneumonia [J18.9]  Yes    Infection due to parainfluenza virus 4 [B34.8]  Yes    Neck pain [M54.2]  Yes    Asthma [J45.909]  Yes    Paratracheal gas collection [R93.89]  Yes      Resolved Hospital Problems   No resolved problems to display.       37 y.o. female admitted with Pneumonia.    Assessment and plan  1.  Complex collection/mass containing air in the right superior paraesophageal region.  There is mass effect on trachea and esophagus on the left.  Thoracic surgery consulted for further input and management. RPP positive for parainfluenza 4 and this probably accounts for her most recent symptoms. She has a sick child at home so that is the likely contact. This is a self-limited illness.      2.  Pneumonia, pulmonary and infectious disease on board.      3.  Paraesophageal mass, keep patient n.p.o. with no sips, ice chips or oral meds. Noted plans for bronch and EGD on Monday 8/28.      4.  History of asthma, chronic condition, does not appear to be in exacerbation.     5.  CODE STATUS is full code.  Further plans based on hospital course.         Juan Estrada MD  [facility]  08/27/23  18:16 EDT      Electronically signed by Juan Estrada MD at 08/27/23 1188       Yoan Giraldo MD at 08/27/23 1225          ID NOTE    CC: f/u fever    Subj: Fever resolved. Feeling better. Cultures negative. WBC normal. Her  and son brought her flowers.     Objective   Vital Signs   Temp:  [97.9 °F (36.6 °C)-98.2 °F (36.8 °C)] 98.2 °F (36.8 °C)  Heart Rate:  [75-98] 77  Resp:  [18] 18  BP: ()/(58-67)  91/58    Physical Exam:   General: awake, alert, NAD, very nice  Eyes: no scleral icterus  Neck: healed incision  Cardiovascular: NR  Respiratory: BLL rales improved  Skin: No rashes  Neurological: Alert and oriented x 3  Psychiatric: Normal mood and affect     Labs:   CBC, BMP, and blood cultures reviewed today  Lab Results   Component Value Date    WBC 7.32 08/27/2023    HGB 9.5 (L) 08/27/2023    HCT 29.1 (L) 08/27/2023    MCV 90.1 08/27/2023     08/27/2023     Lab Results   Component Value Date    GLUCOSE 95 08/27/2023    CALCIUM 8.2 (L) 08/27/2023     08/27/2023    K 3.7 08/27/2023    CO2 25.0 08/27/2023     08/27/2023    BUN 5 (L) 08/27/2023    CREATININE 0.56 (L) 08/27/2023    EGFR 122.2 08/27/2023    BCR 8.9 08/27/2023    ANIONGAP 8.0 08/27/2023     HIV negative  HCG negative  Procal 0.08    Microbiology:  8/24 COVID: negative  8/24 BCx: NGTD  8/25 RPP + paraflu 4    Prior Radiology:  CT Neck:   1. Moderate size right periesophageal soft tissue mass at the cervicothoracic junction as described. This mass contains a small amount of air and is unchanged from yesterday's study. This could be an inflammatory or neoplastic mass.   2. No esophageal perforation was seen on yesterday's esophagram.   3. No pathologic lymphadenopathy or other neck masses are seen.   4. Relatively extensive sinusitis.     ASSESSMENT/PLAN:  Right paraesophageal fluid collection with mass effect  Bilateral lower lobe infiltrates  Fever in adult - resolved  Diarrhea  Parainfluenza 4 infection with sinusitis    RPP positive for parainfluenza 4 and this probably accounts for her most recent symptoms. She has a sick child at home so that is the likely contact. This is a self-limited illness.     Noted plans for bronch and EGD on Monday 8/28. I will continue empiric Zosyn with duration TBD. WBC has normalized on Zosyn. ID will follow.             Electronically signed by Yoan Giraldo MD at 08/27/23 5788    "Hemanth La MD at 08/27/23 1105              Chief Complaint: Paraesophageal mass with concern for mediastinitis    Subjective:  Symptoms:  Stable.  (Mild neck pain).    Diet:  Adequate intake.  No nausea or vomiting.    Activity level: Returning to normal.    Pain:  She complains of pain that is mild.  She reports pain is improving.      Vital Signs:  Temp:  [97.9 °F (36.6 °C)-98.2 °F (36.8 °C)] 98.2 °F (36.8 °C)  Heart Rate:  [75-98] 79  Resp:  [18] 18  BP: ()/(58-67) 91/58    Intake & Output (last day)         08/26 0701 08/27 0700 08/27 0701 08/28 0700    P.O. 210     I.V. (mL/kg)      IV Piggyback 50     Total Intake(mL/kg) 260 (4)     Net +260                   Objective:  General Appearance:  Comfortable, ill-appearing and in no acute distress.    Vital signs: (most recent): Blood pressure 91/58, pulse 79, temperature 98.2 °F (36.8 °C), temperature source Oral, resp. rate 18, height 175.3 cm (69\"), weight 65.3 kg (143 lb 15.4 oz), last menstrual period 08/17/2023, SpO2 100 %.  (Hypotensive, low-grade fever).    Output: Producing urine.    Lungs:  Normal effort and normal respiratory rate.  There are wheezes and rhonchi.    Heart: Normal rate.  Regular rhythm.    Abdomen: There is no abdominal tenderness.     Neurological: Patient is alert and oriented to person, place and time.    Skin:  Warm and dry.            Results Review:     I reviewed the patient's new clinical results.  I reviewed the patient's new imaging results and agree with the interpretation.  I reviewed the patient's other test results and agree with the interpretation    Imaging Results (Last 24 Hours)       ** No results found for the last 24 hours. **            Lab Results:     Lab Results (last 24 hours)       Procedure Component Value Units Date/Time    Basic Metabolic Panel [[id number]]  (Abnormal) Collected: 08/27/23 0617    Specimen: Blood Updated: 08/27/23 0753     Glucose 95 mg/dL      BUN 5 mg/dL      Creatinine 0.56 " mg/dL      Sodium 139 mmol/L      Potassium 3.7 mmol/L      Chloride 106 mmol/L      CO2 25.0 mmol/L      Calcium 8.2 mg/dL      BUN/Creatinine Ratio 8.9     Anion Gap 8.0 mmol/L      eGFR 122.2 mL/min/1.73     Narrative:      CBC & Differential [[id number]]  (Abnormal) Collected: 08/27/23 0617    Specimen: Blood Updated: 08/27/23 0733    Narrative:      CBC Auto Differential [[id number]]  (Abnormal) Collected: 08/27/23 0617    Specimen: Blood Updated: 08/27/23 0733     WBC 7.32 10*3/mm3      RBC 3.23 10*6/mm3      Hemoglobin 9.5 g/dL      Hematocrit 29.1 %      MCV 90.1 fL      MCH 29.4 pg      MCHC 32.6 g/dL      RDW 12.2 %      RDW-SD 40.1 fl      MPV 9.5 fL      Platelets 334 10*3/mm3      Neutrophil % 55.7 %      Lymphocyte % 24.6 %      Monocyte % 8.2 %      Eosinophil % 10.4 %      Basophil % 0.7 %      Immature Grans % 0.4 %      Neutrophils, Absolute 4.08 10*3/mm3      Lymphocytes, Absolute 1.80 10*3/mm3      Monocytes, Absolute 0.60 10*3/mm3      Eosinophils, Absolute 0.76 10*3/mm3      Basophils, Absolute 0.05 10*3/mm3      Immature Grans, Absolute 0.03 10*3/mm3      nRBC 0.0 /100 WBC     Blood Culture - Blood, Arm, Left [[id number]]  (Normal) Collected: 08/24/23 1452    Specimen: Blood from Arm, Left Updated: 08/26/23 1531     Blood Culture No growth at 2 days    Blood Culture - Blood, Arm, Left [[id number]]  (Normal) Collected: 08/24/23 1456    Specimen: Blood from Arm, Left Updated: 08/26/23 1531     Blood Culture No growth at 2 days             Assessment & Plan       Pneumonia    Paratracheal gas collection    Asthma    Neck pain    Infection due to parainfluenza virus 4       Assessment & Plan    Respiratory viral panel positive for parainfluenza.  CT of the neck was performed and demonstrated moderate size right paraesophageal soft tissue mass of the cervical thoracic junction.  This measured approximately 2.9 cm x 2.7 cm x 3.3 cm.  There was a small amount of air.  There was mass effect on the right  lower aspect of the esophagus.  Esophagram demonstrated no evidence of leak or extravasation.  Paraesophageal mass: Patient underwent exploratory procedure in April for a similar issue.    Patient reported that her symptoms started during flight after forceful Valsalva to unclog her ears.  This could be related to similar perforation from tracheal cyst or Montez's dehiscence.  I will plan to do bronchoscopy and EGD tomorrow afternoon.   Leukocytosis improving.  Due to concern for mediastinitis going to keep on IV antibiotics for now.  She can have mechanical soft diet for now. NPO tomorrow morning.  Rest of the care per primary team.    Hemanth La MD  [facility]  23  11:07 EDT    Greater than 35 minutes was spent reviewing the patient's chart, radiographic imaging, labs, provider notes, assessing the patient and developing a plan of care.  This was discussed with the patient and RN.            Electronically signed by Hemanth La MD at 23 4971       Lamont Robles DO at 23 4619              [facility]  [phone]  Dr. Lamont Robles     Subjective:  LOS: 0    Chief Complaint:  SOB and chest pain     Patient was doing well this morning.  She denies any acute concerns or complaints.  Discussed current plan of care she had no additional questions.    Objective   Vital Signs past 24hrs  Temp range: Temp (24hrs), Av °F (36.7 °C), Min:97.9 °F (36.6 °C), Max:98.2 °F (36.8 °C)    BP range: BP: ()/(58-67) 91/58  Pulse range: Heart Rate:  [75-98] 79  Resp rate range: Resp:  [18] 18  Device (Oxygen Therapy): room air   Oxygen range:SpO2:  [97 %-100 %] 100 %     Physical Exam  Constitutional:       Appearance: Normal appearance.   HENT:      Head: Normocephalic and atraumatic.   Eyes:      Extraocular Movements: Extraocular movements intact.      Pupils: Pupils are equal, round, and reactive to light.   Cardiovascular:      Rate and Rhythm: Normal rate  and regular rhythm.      Heart sounds: No murmur heard.  Pulmonary:      Effort: Pulmonary effort is normal. No respiratory distress.      Breath sounds: Normal breath sounds. No wheezing or rales.   Abdominal:      General: Abdomen is flat.      Palpations: Abdomen is soft.      Tenderness: There is no abdominal tenderness.   Musculoskeletal:         General: No swelling. Normal range of motion.      Cervical back: Normal range of motion. No tenderness.   Skin:     General: Skin is warm and dry.      Findings: No rash.   Neurological:      General: No focal deficit present.      Mental Status: She is alert and oriented to person, place, and time.   Psychiatric:         Mood and Affect: Mood normal.         Behavior: Behavior normal.     Results Review:    I have reviewed the laboratory and imaging data since the last note by [facility] physician.  My annotations are noted in assessment and plan.          sodium chloride, 50 mL/hr, Last Rate: 50 mL/hr (08/26/23 0821)      Lines, Drains & Airways       Active LDAs       Name Placement date Placement time Site Days    Peripheral IV 08/24/23 1008 Right Antecubital 08/24/23  1008  Antecubital  2                  No active isolations  Diet Orders (active) (From admission, onward)       Start     Ordered    08/26/23 0804  Diet: Liquid Diets; Full Liquid; Texture: Regular Texture (IDDSI 7); Fluid Consistency: Thin (IDDSI 0)  Diet Effective Now         08/26/23 0803                      Assessment  Possible Aspiration with Pneumonia   Parainfluenza +  Right paraesophageal mass   Leukocytosis  History of recent mediastinitis  History of asthma    Plan  - Continue current antibiotics with stop date   - CT chest showing the paraesophageal mass and some ill definied bilateral scattered opacities in the lung bases and bronchial thickening of RLL, possibly related to aspiration  - CT neck showing Moderate size right periesophageal soft tissue mass containing small amount of air  -  "Thoracic surgery following and planning bronch with EGD early next week  - on room air  - supportive care for parainfluenza   - will follow along peripherally and await results of bronch by CTS this week      Lamont Robles DO   23  09:44 EDT      Part of this note may be an electronic transcription/translation of spoken language to printed text using the Dragon Dictation System.      Electronically signed by Lamont Robles DO at 23 1250       Aung Barber MD at 23 1538          DAILY PROGRESS NOTE  [facility]    Patient Identification:  Name: Wilda Mayo  Age: 37 y.o.  Sex: female  :  1985  MRN: [id number]         Primary Care Physician: Provider, No Known    Subjective:  Interval History: She is hungry and she got some liquids.    Objective:    Scheduled Meds:ipratropium-albuterol, 3 mL, Nebulization, 4x Daily - RT  piperacillin-tazobactam, 3.375 g, Intravenous, Q8H  sodium chloride, 10 mL, Intravenous, Q12H      Continuous Infusions:sodium chloride, 50 mL/hr, Last Rate: 50 mL/hr (23 0821)        Vital signs in last 24 hours:  Temp:  [97.9 °F (36.6 °C)-98.6 °F (37 °C)] 97.9 °F (36.6 °C)  Heart Rate:  [] 87  Resp:  [18] 18  BP: (90-99)/(53-65) 94/58    Intake/Output:    Intake/Output Summary (Last 24 hours) at 2023 1538  Last data filed at 2023 0436  Gross per 24 hour   Intake 960 ml   Output --   Net 960 ml         Exam:  BP 94/58 (BP Location: Left arm, Patient Position: Sitting)   Pulse 87   Temp 97.9 °F (36.6 °C) (Oral)   Resp 18   Ht 175.3 cm (69\")   Wt 65.3 kg (143 lb 15.4 oz)   LMP 2023 (Approximate)   SpO2 100%   BMI 21.26 kg/m²     General Appearance:    Alert, cooperative, no distress   Head:    Normocephalic, without obvious abnormality, atraumatic   Eyes:       Throat:   Lips, tongue, gums normal   Neck:   Supple, symmetrical, trachea midline, no JVD   Lungs:     Clear to auscultation bilaterally, " respirations unlabored   Chest Wall:    No tenderness or deformity    Heart:    Regular rate and rhythm, S1 and S2 normal, no murmur,no  Rub or gallop   Abdomen:     Soft, nontender, bowel sounds active, no masses, no organomegaly    Extremities:   Extremities normal, atraumatic, no cyanosis or edema   Pulses:      Skin:   Skin is warm and dry,  no rashes or palpable lesions   Neurologic:   no focal deficits noted      \      Assessment:  Active Hospital Problems    Diagnosis  POA    **Pneumonia [J18.9]  Yes    Infection due to parainfluenza virus 4 [B34.8]  Yes    Neck pain [M54.2]  Yes    Asthma [J45.909]  Yes    Paratracheal gas collection [R93.89]  Yes      Resolved Hospital Problems   No resolved problems to display.       Plan:  Continue with antibiotics per infectious disease.  Follow-up lab and cultures.   thoracic surgery consult noted.  Plans for bronchoscopy and EGD next week.  Pulmonary consult noted.    Aung Barber MD  8/26/2023  15:38 EDT     Electronically signed by Aung Barber MD at 08/26/23 1541       Yoan Giraldo MD at 08/26/23 0932          ID NOTE    CC: f/u fever    Subj: RPP positive for paraflu-4. Plans for EGD and bronch Monday to eval paraesophageal fluid collection/mass. Feeling better.         Objective   Vital Signs   Temp:  [97.9 °F (36.6 °C)-100 °F (37.8 °C)] 97.9 °F (36.6 °C)  Heart Rate:  [] 87  Resp:  [18] 18  BP: (90-99)/(53-65) 99/65    Physical Exam:   General: awake, alert, NAD, very nice  Eyes: no scleral icterus  Neck: healed incision  Cardiovascular: NR  Respiratory: BLL rales a little better  :  no Keating catheter  Skin: No rashes  Neurological: Alert and oriented x 3  Psychiatric: Normal mood and affect     Labs:   CBC, CMP, RPP, and blood cultures reviewed today  Lab Results   Component Value Date    WBC 11.05 (H) 08/26/2023    HGB 10.3 (L) 08/26/2023    HCT 30.9 (L) 08/26/2023    MCV 90.1 08/26/2023     08/26/2023       Lab Results    Component Value Date    GLUCOSE 77 2023    BUN 8 2023    CREATININE 0.54 (L) 2023    BCR 14.8 2023    CO2 23.0 2023    CALCIUM 8.1 (L) 2023    ALBUMIN 3.0 (L) 2023    AST 19 2023    ALT 26 2023     HIV pending  HCG negative  Procal 0.08    Microbiology:   COVID: negative   BCx: NGTD   RPP + paraflu 4    New radiology:  CT Neck:   1. Moderate size right periesophageal soft tissue mass at the   cervicothoracic junction as described. This mass contains a small amount   of air and is unchanged from yesterday's study. This could be an   inflammatory or neoplastic mass.   2. No esophageal perforation was seen on yesterday's esophagram.   3. No pathologic lymphadenopathy or other neck masses are seen.   4. Relatively extensive sinusitis.     ASSESSMENT/PLAN:  Right paraesophageal fluid collection with mass effect  Bilateral lower lobe infiltrates  Fever in adult  Diarrhea  Parainfluenza 4 infection with sinusitis    RPP positive for parainfluenza 4 and this probably accounts for her most recent symptoms. She has a sick child at home so that is the likely contact.     CT neck report reviewed and I reviewed the thoracic surgery note. Plans for bronch and EGD on . I will continue empiric Zosyn. ID will follow and see again on .             Electronically signed by Yoan Giraldo MD at 23 0934       Lamont Robles DO at 23 0920              [facility]  [phone]  Dr. Lamont Robles     Subjective:  LOS: 0    Chief Complaint:  SOB and chest pain    Pt is doing well today. She denies any acute concerns or complaints. NAEON. Discussed plan of care with her at bedside. All questions answered.     Objective   Vital Signs past 24hrs  Temp range: Temp (24hrs), Av.7 °F (37.1 °C), Min:97.9 °F (36.6 °C), Max:100 °F (37.8 °C)    BP range: BP: (90-99)/(53-65) 99/65  Pulse range: Heart Rate:  []  87  Resp rate range: Resp:  [18] 18  Device (Oxygen Therapy): room air   Oxygen range:SpO2:  [96 %-100 %] 100 %     Physical Exam  Constitutional:       Appearance: Normal appearance.   HENT:      Head: Normocephalic and atraumatic.   Eyes:      Extraocular Movements: Extraocular movements intact.      Pupils: Pupils are equal, round, and reactive to light.   Cardiovascular:      Rate and Rhythm: Normal rate and regular rhythm.      Heart sounds: No murmur heard.  Pulmonary:      Effort: Pulmonary effort is normal. No respiratory distress.      Breath sounds: Normal breath sounds. No wheezing or rales.   Abdominal:      General: Abdomen is flat.      Palpations: Abdomen is soft.      Tenderness: There is no abdominal tenderness.   Musculoskeletal:         General: No swelling. Normal range of motion.      Cervical back: Normal range of motion. No tenderness.   Skin:     General: Skin is warm and dry.      Findings: No rash.   Neurological:      General: No focal deficit present.      Mental Status: She is alert and oriented to person, place, and time.   Psychiatric:         Mood and Affect: Mood normal.         Behavior: Behavior normal.     Results Review:    I have reviewed the laboratory and imaging data since the last note by [facility] physician.  My annotations are noted in assessment and plan.      Result Review:  I have personally reviewed the results from last note by [facility] physician to 8/26/2023 09:21 EDT and agree with these findings:  [x]  Laboratory list / accordion  [x]  Microbiology  [x]  Radiology  [x]  EKG/Telemetry   [x]  Cardiology/Vascular   [x]  Pathology  [x]  Old records  []  Other:    Medication Review:  I have reviewed the current MAR.  My annotations are noted in assessment and plan.    ipratropium-albuterol, 3 mL, Nebulization, 4x Daily - RT  piperacillin-tazobactam, 3.375 g, Intravenous, Q8H  sodium chloride, 10 mL, Intravenous, Q12H        sodium chloride, 50 mL/hr, Last Rate: 50 mL/hr (08/26/23  0821)      Lines, Drains & Airways       Active LDAs       Name Placement date Placement time Site Days    Peripheral IV 08/24/23 1008 Right Antecubital 08/24/23  1008  Antecubital  1                  No active isolations  Diet Orders (active) (From admission, onward)       Start     Ordered    08/26/23 0804  Diet: Liquid Diets; Full Liquid; Texture: Regular Texture (IDDSI 7); Fluid Consistency: Thin (IDDSI 0)  Diet Effective Now         08/26/23 0803                      Assessment  Possible Aspiration with Pneumonia   Parainfluenza +  Right paraesophageal mass   Leukocytosis  History of mediastinitis  History of asthma    Plan  - Continue current antibiotics with stop date   - CT chest showing the paraesophageal mass and some ill definied bilateral scattered opacities in the lung bases and bronchial thickening of RLL, possibly related to aspiration  - CT neck showing Moderate size right periesophageal soft tissue mass containing small amount of air  - Thoracic surgery following and planning bronch with EGD early next week  - on room air  - supportive care for parainfluenza   - Monitor clinical course closely.        THESE ARE NEW MEDICAL PROBLEMS TO ME.      Lamont Robles DO   08/26/23  09:21 EDT      Part of this note may be an electronic transcription/translation of spoken language to printed text using the Dragon Dictation System.      Electronically signed by Lamont Robles DO at 08/26/23 1349       Hemanth La MD at 08/26/23 0831              Chief Complaint: Paraesophageal mass with concern for mediastinitis    Subjective:  Symptoms:  Stable.  She reports chest pain.    Diet:  NPO.  No nausea or vomiting.    Activity level: Returning to normal.    Pain:  She complains of pain that is mild.      Vital Signs:  Temp:  [97.9 °F (36.6 °C)-100 °F (37.8 °C)] 97.9 °F (36.6 °C)  Heart Rate:  [] 87  Resp:  [18] 18  BP: (90-99)/(53-65) 99/65    Intake & Output (last day)         08/25 0701  08/26 0700  "08/26 0701  08/27 0700    P.O.      I.V. (mL/kg) 960 (14.7)     IV Piggyback      Total Intake(mL/kg) 960 (14.7)     Net +960           Urine Unmeasured Occurrence 4 x     Stool Unmeasured Occurrence 1 x             Objective:  General Appearance:  Comfortable, ill-appearing and in no acute distress.    Vital signs: (most recent): Blood pressure 99/65, pulse 87, temperature 97.9 °F (36.6 °C), temperature source Oral, resp. rate 18, height 175.3 cm (69\"), weight 65.3 kg (143 lb 15.4 oz), last menstrual period 08/17/2023, SpO2 100 %.  (Hypotensive, low-grade fever).    Output: Producing urine.    Lungs:  Normal effort and normal respiratory rate.  There are wheezes and rhonchi.    Heart: Normal rate.  Regular rhythm.    Abdomen: There is no abdominal tenderness.     Neurological: Patient is alert and oriented to person, place and time.    Skin:  Warm and dry.              Results Review:     I reviewed the patient's new clinical results.  I reviewed the patient's new imaging results and agree with the interpretation.  I reviewed the patient's other test results and agree with the interpretation    Imaging Results (Last 24 Hours)       Procedure Component Value Units Date/Time    CT Soft Tissue Neck With Contrast [[id number]] Collected: 08/25/23 1333     Updated: 08/25/23 1333    Narrative:      CT OF THE NECK WITH CONTRAST 08/25/2023     HISTORY: Follow-up esophageal mass.     Spiral images were obtained from the orbits to the lung apices after  intravenous contrast. The previous CT of the chest from yesterday is  compared.     Again seen is soft tissue mass in the right paraesophageal region at the  level of the cervicothoracic junction immediately anterior to the spine.  It measures approximately 2.9 cm by approximately 2.7 cm in transverse  dimensions by approximately 3.3 cm in craniocaudal dimension when it  compressesed the posterior aspect of the right thyroid lobe. This mass  contains a small amount of air. " There is mass effect on the rightward  aspect of the esophagus. No esophageal perforation was seen on  yesterday's esophagram.     The airway is patent.     No pathologically enlarged lymph nodes are seen in the neck. A few  shotty nodes are seen. Salivary glands appear within normal limits.  Normal vascular enhancement is seen.     There is moderately severe sinusitis involving the bilateral ethmoid,  bilateral maxillary and sphenoid sinuses. Frontal sinus is not included  in the field-of-view.       Impression:      1. Moderate size right periesophageal soft tissue mass at the  cervicothoracic junction as described. This mass contains a small amount  of air and is unchanged from yesterday's study. This could be an  inflammatory or neoplastic mass.  2. No esophageal perforation was seen on yesterday's esophagram.  3. No pathologic lymphadenopathy or other neck masses are seen.  4. Relatively extensive sinusitis.     Radiation dose reduction techniques were utilized, including automated  exposure control and exposure modulation based on body size.                                            Lab Results:     Lab Results (last 24 hours)       Procedure Component Value Units Date/Time    Comprehensive Metabolic Panel [[id number]]  (Abnormal) Collected: 08/26/23 0520    Specimen: Blood Updated: 08/26/23 0657     Glucose 77 mg/dL      BUN 8 mg/dL      Creatinine 0.54 mg/dL      Sodium 139 mmol/L      Potassium 3.7 mmol/L      Chloride 106 mmol/L      CO2 23.0 mmol/L      Calcium 8.1 mg/dL      Total Protein 5.9 g/dL      Albumin 3.0 g/dL      ALT (SGPT) 26 U/L      AST (SGOT) 19 U/L      Alkaline Phosphatase 129 U/L      Total Bilirubin 0.5 mg/dL      Globulin 2.9 gm/dL      A/G Ratio 1.0 g/dL      BUN/Creatinine Ratio 14.8     Anion Gap 10.0 mmol/L      eGFR 123.3 mL/min/1.73     Narrative:      CBC (No Diff) [[id number]]  (Abnormal) Collected: 08/26/23 0520    Specimen: Blood Updated: 08/26/23 0641     WBC 11.05 10*3/mm3       RBC 3.43 10*6/mm3      Hemoglobin 10.3 g/dL      Hematocrit 30.9 %      MCV 90.1 fL      MCH 30.0 pg      MCHC 33.3 g/dL      RDW 12.2 %      RDW-SD 40.1 fl      MPV 9.7 fL      Platelets 333 10*3/mm3     HIV-1 / O / 2 Ag / Antibody 4th Generation [[id number]] Collected: 08/26/23 0520    Specimen: Blood Updated: 08/26/23 0631    Blood Culture - Blood, Arm, Left [[id number]]  (Normal) Collected: 08/24/23 1452    Specimen: Blood from Arm, Left Updated: 08/25/23 1531     Blood Culture No growth at 24 hours    Blood Culture - Blood, Arm, Left [[id number]]  (Normal) Collected: 08/24/23 1456    Specimen: Blood from Arm, Left Updated: 08/25/23 1531     Blood Culture No growth at 24 hours    Respiratory Panel PCR w/COVID-19(SARS-CoV-2) [facility]/DARIELA/HECTOR/[facility]/EMILE In-House, NP Swab in UTM/VTM, 3-4 HR TAT - Swab, Nasopharynx [[id number]]  (Abnormal) Collected: 08/25/23 0949    Specimen: Swab from Nasopharynx Updated: 08/25/23 1147     ADENOVIRUS, PCR Not Detected     Coronavirus 229E Not Detected     Coronavirus HKU1 Not Detected     Coronavirus NL63 Not Detected     Coronavirus OC43 Not Detected     COVID19 Not Detected     Human Metapneumovirus Not Detected     Human Rhinovirus/Enterovirus Not Detected     Influenza A PCR Not Detected     Influenza B PCR Not Detected     Parainfluenza Virus 1 Not Detected     Parainfluenza Virus 2 Not Detected     Parainfluenza Virus 3 Not Detected     Parainfluenza Virus 4 Detected     RSV, PCR Not Detected     Bordetella pertussis pcr Not Detected     Bordetella parapertussis PCR Not Detected     Chlamydophila pneumoniae PCR Not Detected     Mycoplasma pneumo by PCR Not Detected    Narrative:      In the setting of a positive respiratory panel with a viral infection PLUS a negative procalcitonin without other underlying concern for bacterial infection, consider observing off antibiotics or discontinuation of antibiotics and continue supportive care. If the respiratory panel is positive  for atypical bacterial infection (Bordetella pertussis, Chlamydophila pneumoniae, or Mycoplasma pneumoniae), consider antibiotic de-escalation to target atypical bacterial infection.             Assessment & Plan       Pneumonia    Paratracheal gas collection    Asthma    Neck pain    Infection due to parainfluenza virus 4       Assessment & Plan    Respiratory viral panel positive for parainfluenza.  CT of the neck was performed and demonstrates moderate size right paraesophageal soft tissue mass of the cervical thoracic junction.  This measures approximately 2.9 cm x 2.7 cm x 3.3 cm.  There is a small amount of air.  There is mass effect on the right lower aspect of the esophagus.  Esophagram demonstrated no evidence of leak or extravasation.  Paraesophageal mass: Patient underwent exploratory procedure in April for a similar issue.    Patient reported that her symptoms started during flight after forceful Valsalva to unclog her ears.  This could be related to similar perforation from tracheal cyst or Seabeck's dehiscence.  I will plan to do bronchoscopy and EGD early next week.  Leukocytosis improving.  Due to concern for mediastinitis going to keep on IV antibiotics for now.  She can have full liquid diet.  Rest of the care per primary team.    Hemanth La MD  [facility]  08/26/23  08:49 EDT    Greater than 35 minutes was spent reviewing the patient's chart, radiographic imaging, labs, provider notes, assessing the patient and developing a plan of care.  This was discussed with the patient and RN.            Electronically signed by Hemanth La MD at 08/26/23 3904       Beverly Araya APRN at 08/25/23 1509       Attestation signed by Hemanth La MD at 08/25/23 3667    I have reviewed this documentation and agree.                      Chief Complaint: Paraesophageal mass with concern for mediastinitis      Subjective:  Symptoms:  Stable.  She reports chest pain.    Diet:  NPO.  No nausea  "or vomiting.    Activity level: Returning to normal.    Pain:  She complains of pain that is mild.      Vital Signs:  Temp:  [98.6 °F (37 °C)-100 °F (37.8 °C)] 100 °F (37.8 °C)  Heart Rate:  [] 89  Resp:  [18] 18  BP: ()/(47-70) 96/59    Intake & Output (last day)         08/24 0701  08/25 0700 08/25 0701  08/26 0700    P.O. 0     IV Piggyback 1100     Total Intake(mL/kg) 1100 (16.8)     Net +1100           Urine Unmeasured Occurrence 1 x     Stool Unmeasured Occurrence 1 x             Objective:  General Appearance:  Comfortable, ill-appearing and in no acute distress.    Vital signs: (most recent): Blood pressure 96/59, pulse 89, temperature 100 °F (37.8 °C), resp. rate 18, height 175.3 cm (69\"), weight 65.3 kg (143 lb 15.4 oz), last menstrual period 08/17/2023, SpO2 96 %.  (Hypotensive, low-grade fever).    Output: Producing urine.    Lungs:  Normal effort and normal respiratory rate.  There are wheezes and rhonchi.    Heart: Normal rate.  Regular rhythm.    Abdomen: There is no abdominal tenderness.     Neurological: Patient is alert and oriented to person, place and time.    Skin:  Warm and dry.                Results Review:     I reviewed the patient's new clinical results.  I reviewed the patient's new imaging results and agree with the interpretation.  I reviewed the patient's other test results and agree with the interpretation  Discussed with patient, Dr. La.     Imaging Results (Last 24 Hours)       Procedure Component Value Units Date/Time    CT Soft Tissue Neck With Contrast [[id number]] Collected: 08/25/23 1333     Updated: 08/25/23 1333    Narrative:      CT OF THE NECK WITH CONTRAST 08/25/2023     HISTORY: Follow-up esophageal mass.     Spiral images were obtained from the orbits to the lung apices after  intravenous contrast. The previous CT of the chest from yesterday is  compared.     Again seen is soft tissue mass in the right paraesophageal region at the  level of the " cervicothoracic junction immediately anterior to the spine.  It measures approximately 2.9 cm by approximately 2.7 cm in transverse  dimensions by approximately 3.3 cm in craniocaudal dimension when it  compressesed the posterior aspect of the right thyroid lobe. This mass  contains a small amount of air. There is mass effect on the rightward  aspect of the esophagus. No esophageal perforation was seen on  yesterday's esophagram.     The airway is patent.     No pathologically enlarged lymph nodes are seen in the neck. A few  shotty nodes are seen. Salivary glands appear within normal limits.  Normal vascular enhancement is seen.     There is moderately severe sinusitis involving the bilateral ethmoid,  bilateral maxillary and sphenoid sinuses. Frontal sinus is not included  in the field-of-view.       Impression:      1. Moderate size right periesophageal soft tissue mass at the  cervicothoracic junction as described. This mass contains a small amount  of air and is unchanged from yesterday's study. This could be an  inflammatory or neoplastic mass.  2. No esophageal perforation was seen on yesterday's esophagram.  3. No pathologic lymphadenopathy or other neck masses are seen.  4. Relatively extensive sinusitis.     Radiation dose reduction techniques were utilized, including automated  exposure control and exposure modulation based on body size.          CT Angiogram Chest Pulmonary Embolism [[id number]] Collected: 08/24/23 1209     Updated: 08/25/23 0851    Narrative:      CT ANGIOGRAM OF THE CHEST. MULTIPLE CORONAL, SAGITTAL, AND 3-D  RECONSTRUCTIONS.     HISTORY: 37-year-old female with chest pain and cough.     TECHNIQUE: Radiation dose reduction techniques were utilized, including  automated exposure control and exposure modulation based on body size.   CT angiogram of the chest was performed following the administration of  IV contrast. Multiple coronal, sagittal, and 3-D reconstruction images  were  obtained. Compared with chest CTA and neck CT 04/11/2023. Also  correlated with esophagram 04/11/2023.     FINDINGS: There is no evidence for pulmonary thromboemboli. Again seen  is a complex air-containing mass/collection in the superior right  paraesophageal region at the thoracic inlet and superior mediastinum.  The margins were better seen on the neck CT and appear without  significant change since the chest CTA performed the same day. There is  mass effect on the on the trachea and esophagus to the left. The  measurements are approximately 3.1 x 2.6 cm which is grossly unchanged.  The esophageal wall is indistinct and there is indistinct increased  density throughout the mediastinum and sherwin without discrete  pathologically enlarged nodes. There are no pleural or pericardial  effusions. There is mild asymmetric bronchial thickening at the right  lower lobe which was not definitively present previously and there are  new ill-defined mixed-density peribronchial airspace opacities in the  perihilar region of the left lower lobe and at both lung bases. There is  also ill-defined ground-glass opacification at the right lower lobe.       Impression:      Outside facility neck CT 5/5/2023 has become available for  comparison. This collection was predominantly air-filled on the outside  facility neck CT. A RECURRENT INFECTED RIGHT PARATRACHEAL CYST IS  SUSPECTED TO BE THE ETIOLOGY FOR THESE FINDINGS. THORACIC SURGERY  CONSULTATION IS RECOMMENDED.        1. Persistent or recurrent approximately 3.1 x 2.6 cm complex  collection/mass containing air in the superior right paraesophageal  region. As discussed previously, an infectious or inflammatory etiology  is suspected, but malignancy cannot be entirely excluded. There is mass  effect on the trachea and esophagus to the left. No definite esophageal  leak was seen on the previous esophagram. Thoracic surgery consultation  is recommended.  2. The new bilateral lower lobe  airspace opacities as described likely  represent bronchiolitis/developing pneumonia and aspiration is a  consideration.  3. There is no evidence for pulmonary thromboemboli.           This report was finalized on 2023 8:47 AM by Dr. Winifred Murcia M.D.               Lab Results:     Lab Results (last 24 hours)        Assessment & Plan       Pneumonia    Paratracheal gas collection    Asthma    Neck pain    Infection due to parainfluenza virus 4       Assessment & Plan    Respiratory viral panel positive for parainfluenza.  CT of the neck was performed today and demonstrates moderate size right paraesophageal soft tissue mass of the cervical thoracic junction.  This measures approximately 2.9 cm x 2.7 cm x 3.3 cm.  There is a small amount of air.  There is mass effect on the right lower aspect of the esophagus.  Yesterday's esophagram demonstrated no evidence of leak or extravasation.  Paraesophageal mass: Patient underwent exploratory procedure in April by Dr. La for a similar issue.  She will likely need re-exploration of the neck which can be performed early next week.  White count up today to 17.68 (from 16.6 yesterday).  Discussed with Dr. La in detail.  Due to concern for mediastinitis going to keep her n.p.o. for 1 more day. Continue IV fluids for now.    ANNETTE Waller  [facility]  23  15:16 EDT    Greater than 35 minutes was spent reviewing the patient's chart, radiographic imaging, labs, provider notes, assessing the patient and developing a plan of care.  This was discussed with the patient and RN.            Electronically signed by Hemanth La MD at 23 1533       Aung Braber MD at 23 1450          DAILY PROGRESS NOTE  [facility]    Patient Identification:  Name: Wilda Mayo  Age: 37 y.o.  Sex: female  :  1985  MRN: [id number]         Primary Care Physician: Provider, No Known    Subjective:  Interval History: She is  "hungry and wants some food.    Objective:    Scheduled Meds:piperacillin-tazobactam, 3.375 g, Intravenous, Q8H  sodium chloride, 10 mL, Intravenous, Q12H      Continuous Infusions:sodium chloride, 100 mL/hr, Last Rate: 100 mL/hr (08/25/23 1323)        Vital signs in last 24 hours:  Temp:  [98.6 °F (37 °C)-100 °F (37.8 °C)] 100 °F (37.8 °C)  Heart Rate:  [] 89  Resp:  [18] 18  BP: ()/(47-70) 96/59    Intake/Output:    Intake/Output Summary (Last 24 hours) at 8/25/2023 1450  Last data filed at 8/25/2023 0500  Gross per 24 hour   Intake 100 ml   Output --   Net 100 ml       Exam:  BP 96/59   Pulse 89   Temp 100 °F (37.8 °C)   Resp 18   Ht 175.3 cm (69\")   Wt 65.3 kg (143 lb 15.4 oz)   LMP 08/17/2023 (Approximate)   SpO2 96%   BMI 21.26 kg/m²     General Appearance:    Alert, cooperative, no distress   Head:    Normocephalic, without obvious abnormality, atraumatic   Eyes:       Throat:   Lips, tongue, gums normal   Neck:   Supple, symmetrical, trachea midline, no JVD   Lungs:     Clear to auscultation bilaterally, respirations unlabored   Chest Wall:    No tenderness or deformity    Heart:    Regular rate and rhythm, S1 and S2 normal, no murmur,no  Rub or gallop   Abdomen:     Soft, nontender, bowel sounds active, no masses, no organomegaly    Extremities:   Extremities normal, atraumatic, no cyanosis or edema   Pulses:      Skin:   Skin is warm and dry,  no rashes or palpable lesions   Neurologic:   no focal deficits noted      Lab Results (last 72 hours)       Procedure Component Value Units Date/Time    Respiratory Panel PCR w/COVID-19(SARS-CoV-2) [facility]/DARIELA/HECTOR/PAD/[facility]/MAD/EMILE In-House, NP Swab in UTM/VTM, 3-4 HR TAT - Swab, Nasopharynx [[id number]]  (Abnormal) Collected: 08/25/23 0949    Specimen: Swab from Nasopharynx Updated: 08/25/23 1147     ADENOVIRUS, PCR Not Detected     Coronavirus 229E Not Detected     Coronavirus HKU1 Not Detected     Coronavirus NL63 Not Detected     Coronavirus OC43 Not " Detected     COVID19 Not Detected     Human Metapneumovirus Not Detected     Human Rhinovirus/Enterovirus Not Detected     Influenza A PCR Not Detected     Influenza B PCR Not Detected     Parainfluenza Virus 1 Not Detected     Parainfluenza Virus 2 Not Detected     Parainfluenza Virus 3 Not Detected     Parainfluenza Virus 4 Detected     RSV, PCR Not Detected     Bordetella pertussis pcr Not Detected     Bordetella parapertussis PCR Not Detected     Chlamydophila pneumoniae PCR Not Detected     Mycoplasma pneumo by PCR Not Detected    Narrative:      Comprehensive Metabolic Panel [[id number]]  (Abnormal) Collected: 08/25/23 0724    Specimen: Blood Updated: 08/25/23 0823     Glucose 84 mg/dL      BUN 8 mg/dL      Creatinine 0.64 mg/dL      Sodium 142 mmol/L      Potassium 3.6 mmol/L      Chloride 108 mmol/L      CO2 20.3 mmol/L      Calcium 8.4 mg/dL      Total Protein 5.9 g/dL      Albumin 3.2 g/dL      ALT (SGPT) 28 U/L      AST (SGOT) 12 U/L      Alkaline Phosphatase 78 U/L      Total Bilirubin 0.4 mg/dL      Globulin 2.7 gm/dL      A/G Ratio 1.2 g/dL      BUN/Creatinine Ratio 12.5     Anion Gap 13.7 mmol/L      eGFR 118.4 mL/min/1.73     Narrative:      CBC Auto Differential [[id number]]  (Abnormal) Collected: 08/25/23 0724    Specimen: Blood Updated: 08/25/23 0803     WBC 17.68 10*3/mm3      RBC 3.75 10*6/mm3      Hemoglobin 11.3 g/dL      Hematocrit 33.7 %      MCV 89.9 fL      MCH 30.1 pg      MCHC 33.5 g/dL      RDW 12.3 %      RDW-SD 40.0 fl      MPV 9.5 fL      Platelets 346 10*3/mm3      Neutrophil % 81.1 %      Lymphocyte % 10.0 %      Monocyte % 6.4 %      Eosinophil % 1.6 %      Basophil % 0.3 %      Immature Grans % 0.6 %      Neutrophils, Absolute 14.32 10*3/mm3      Lymphocytes, Absolute 1.76 10*3/mm3      Monocytes, Absolute 1.14 10*3/mm3      Eosinophils, Absolute 0.29 10*3/mm3      Basophils, Absolute 0.06 10*3/mm3      Immature Grans, Absolute 0.11 10*3/mm3      nRBC 0.0 /100 WBC     Lactic Acid,  Plasma [[id number]]  (Normal) Collected: 08/24/23 1452    Specimen: Blood from Arm, Left Updated: 08/24/23 1532     Lactate 1.0 mmol/L     Blood Culture - Blood, Arm, Left [[id number]] Collected: 08/24/23 1452    Specimen: Blood from Arm, Left Updated: 08/24/23 1515    Blood Culture - Blood, Arm, Left [[id number]] Collected: 08/24/23 1456    Specimen: Blood from Arm, Left Updated: 08/24/23 1515    High Sensitivity Troponin T 2Hr [[id number]] Collected: 08/24/23 1229    Specimen: Blood Updated: 08/24/23 1301     HS Troponin T <6 ng/L      Troponin T Delta --       Assessment:  Active Hospital Problems    Diagnosis  POA    **Pneumonia [J18.9]  Yes    Infection due to parainfluenza virus 4 [B34.8]  Yes    Neck pain [M54.2]  Yes    Asthma [J45.909]  Yes    Paratracheal gas collection [R93.89]  Yes      Resolved Hospital Problems   No resolved problems to display.       Plan:  Continue with antibiotics per infectious disease.  Follow-up lab and cultures.  Await plans from thoracic surgery.  Pulmonary consult noted.    Aung Barber MD  8/25/2023  14:50 EDT     Electronically signed by Aung Barber MD at 08/25/23 1451       Mora Friedman MD at 08/25/23 1202                [facility]         Dr Mora Friedman   LOS: 0 days   Patient Care Team:  Provider, No Known as PCP - General    Chief Complaint: Pneumonia with paraesophageal fluid collection questionable abscess other issues as listed below    Interval History: Remains on room air complains of pain around her throat right side of her chest and upper back.    REVIEW OF SYSTEMS:   CARDIOVASCULAR: Chest pain as above  RESPIRATORY: No shortness of breath, cough or sputum.   GASTROINTESTINAL: No anorexia, nausea, vomiting or diarrhea. No abdominal pain or blood.   HEMATOLOGIC: No bleeding or bruising.     Ventilator/Non-Invasive Ventilation Settings (From admission, onward)      None              Vital Signs  Temp:  [98.6 °F (37 °C)-101.4 °F (38.6 °C)] 99.7 °F  "(37.6 °C)  Heart Rate:  [] 94  Resp:  [18] 18  BP: ()/(47-77) 87/47    Intake/Output Summary (Last 24 hours) at 8/25/2023 1202  Last data filed at 8/25/2023 0500  Gross per 24 hour   Intake 1100 ml   Output --   Net 1100 ml     Flowsheet Rows      Flowsheet Row First Filed Value   Admission Height 175.3 cm (69\") Documented at 08/24/2023 0927   Admission Weight 68.5 kg (151 lb) Documented at 08/24/2023 0927            Physical Exam:  Patient is examined using the personal protective equipment as per guidelines from infection control for this particular patient as enacted.  Hand hygiene was performed before and after patient interaction.   General Appearance:    Alert, cooperative, in no acute distress.  Following simple commands  ENT Mallampati between 3 and 4 no nasal congestion  Neck midline trachea, no thyromegaly   Lungs:      diminished breath sounds mostly clear    Heart:    Regular rhythm and normal rate, normal S1 and S2, no            murmur, no gallop, no rub, no click   Chest Wall:    No abnormalities observed   Abdomen:     Normal bowel sounds, no masses, no organomegaly, soft        nontender, nondistended, no guarding, no rebound                tenderness   Extremities:   Moves all extremities well, no edema, no cyanosis, no             redness  CNS no focal neurological deficits normal sensory exam  Skin no rashes no nodules  Musculoskeletal no cyanosis no clubbing normal range of motion     Results Review:              I reviewed the patient's new clinical results.  I personally viewed and interpreted the patient's chest x-ray.        Medication Review:   doxycycline, 100 mg, Intravenous, Q12H  piperacillin-tazobactam, 3.375 g, Intravenous, Q8H  sodium chloride, 10 mL, Intravenous, Q12H        sodium chloride, 100 mL/hr, Last Rate: 100 mL/hr (08/25/23 0005)        ASSESSMENT:   Pneumonia  Right paraesophageal collection/questionable abscess  Leukocytosis  History of mediastinitis  History " "of asthma       PLAN:  Continue current antibiotics  Minimal pneumonia noted on CT chest current antibiotics per infectious diseases.  CT neck pending  Thoracic surgery input noted.  She remains on room air  Monitor clinical course closely.      Mora Friedman MD  08/25/23  12:02 EDT            Electronically signed by Mora Friedman MD at 08/25/23 1205          Consult Notes (all)        Yoan Giraldo MD at 08/25/23 0804        Consult Orders    1. 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 History is obtained from the patient and review of the old medical records which I summarize/synthesize as follows: She presented to the ER on 8/24/23 with cough, fever, and chest pain. She reports a feeling like a \"balloon\" in her throat and chest. No alleviating factors.     Back in April 2023 she was found to have a paraesophageal mass for which she underwent neck exploration likely related to a contained perforation. She was diagnosed with mediastinitis. Our group saw her during that admission and treated her with Zosyn in-house followed by Augmentin for 5 days at discharge. There were no cultures to guide therapy.    Back to this admission, she says her symptoms began about 4 days ago. She had been in [place] visiting her mother who has lung cancer. She felt ill the last day of her trip there. She has a son at home who has been sick with what sounds like an upper respiratory infection. She says her symptoms this admission feel quite similar to last time.     Labs were notable for a WBC 16 " "and procal 0.08. PCR negative for COVID-19. CT showed Persistent approximately 3.1 x 2.6 cm complex collection/mass containing air in the superior right paraesophageal region. There was also mention of new BLL airspace consolidation possibly aspiration related. Esophagram was negative for evidence of leak. CT surgery and pulmonary following. ID also asked to weigh in. She has been given ceftriaxone, azithromycin, doxycycline, and Zosyn so far. CT of the neck is pending.     She is also report some loose stools.     Past Medical History:   Diagnosis Date    Asthma     Pneumonia 8/24/2023       Past Surgical History:   Procedure Laterality Date    ESOPHAGUS SURGERY N/A 04/11/2023    Procedure: EXPLORATION OF NECK, EXCISION OF NECK MASS, EGD, AND FLEXIBLE BRONCHOSCOPY;  Surgeon: Hemanth La MD;  Location: [facility];  Service: Thoracic;  Laterality: N/A;       Social History:  Moved to  from [place] about 6 years ago ([place]); moved to [place] end of 2022  Lives w/ son and     Vital Signs   Temp:  [98.4 °F (36.9 °C)-101.4 °F (38.6 °C)] 99.7 °F (37.6 °C)  Heart Rate:  [] 94  Resp:  [18] 18  BP: ()/(47-77) 87/47    Physical Exam:   General: awake, alert, NAD, very nice  Eyes: no scleral icterus  ENT: no thrush  Neck: healed incision  Cardiovascular: NR  Respiratory: BLL rales w/ severe coughing with deep inspiration  GI: Abdomen is soft, not tender, + bowel sounds in all four quadrants  :  no Keating catheter  Skin: No rashes  Neurological: Alert and oriented x 3  Psychiatric: Normal mood and affect   Vasc: PIV w/  HCG negative  Procal 0.08    Microbiology:  8/24 COVID: negative  8/24 BCx: pending      Radiology:  Esophogram: \"No evidence of esophageal perforation. \"    CTA Chest:   \"1. Persistent approximately 3.1 x 2.6 cm complex collection/mass   containing air in the superior right paraesophageal region. As discussed   previously, an infectious or inflammatory etiology is suspected, but the " "  etiology is uncertain and malignancy cannot be entirely excluded. There   is mass effect on the trachea and esophagus to the left. No definite   esophageal leak was seen on the previous esophagram. Thoracic surgery   consultation is recommended.   2. The new bilateral lower lobe airspace opacities as described likely   represent bronchiolitis/developing pneumonia and aspiration is a   consideration.   3. There is no evidence for pulmonary thromboemboli. \"    CT Neck: pending    ASSESSMENT/PLAN:  Right paraesophageal fluid collection with mass effect  Bilateral lower lobe infiltrates  Fever in adult  Diarrhea    Symptoms could certainly be similar to last admission and related to the collection seen on CT chest. CT neck is pending, and I will follow-up that result. I will continue her on Zosyn and doxycycline for now while awaiting more info.     Also, her young son has respiratory virus so will check an RPP (without COVID-19 since it was already done and is negative) as she could have a viral URI causing her current fever and lower lobe findings on CT chest.     If loose stools persist, will check stool studies given recent travel.     ID will follow. Case and its management will be discussed w/ thoracic surgery team.     ADDENDUM: RPP positive for parainfluenza. Precautions per infection control. I will stop her doxycycline. I'll continue Zosyn for now while awaiting CT scan and thoracic surgery update.          Electronically signed by Yoan Giraldo MD at 08/25/23 1318       Mora Friedman MD at 08/24/23 1716        Consult Orders    1. Inpatient Pulmonology Consult [[id number]] ordered by Juan Estrada MD at 08/24/23 1643                 CONSULT NOTE    Patient Identification:  Wilda Mayo  37 y.o.  female  1985  [id number]            Requesting physician: Hospitalist    Reason for Consultation: Pneumonia    CC:     History of Present Illness:  37-year-old female present to the " "emergency room recent travel to [place] 5 days ago returned with fever of 104 cough and congestion.  Also reports right-sided chest discomfort with shortness of breath.  Apparently she had a similar episode with chest pain and neck pain related to an episode of infectious inflammatory process in April where it was noted that she had paratracheal gas collection with mediastinitis for which she required thoracic surgery OR with right neck exploration excision of parotid neck mass that appeared to be lipoma.  It was felt that patient had subclinical hypopharyngeal perforation that may have seizure that caused inflammation into the deep cervical space and superior mediastinitis.  Patient also required broad-spectrum antibiotics per IV's recommendation.      Physical Exam:  BP 93/57   Pulse 97   Temp 98.9 °F (37.2 °C) (Oral)   Resp 18   Ht 175.3 cm (69\")   Wt 65.3 kg (143 lb 15.4 oz)   LMP 08/17/2023 (Approximate)   SpO2 96%   BMI 21.26 kg/m²  Body mass index is 21.26 kg/m². 96% 65.3 kg (143 lb 15.4 oz)  Physical Exam  Patient is examined using the personal protective equipment as per guidelines from infection control for this particular patient as enacted.  Hand hygiene was performed before and after patient interaction.  Well-developed normal body habitus  Eyes normal conjunctivae and pupils reactive to light  ENT Mallampati between 3 and 4 normal nasal exam  Neck midline trachea no thyromegaly  Chest diminished breath sounds occasional rhonchi bilaterally in the bases  CVS regular rate and rhythm no lower extremity edema  Abdomen soft nontender no hepatosplenomegaly  CNS intact normal sensory exam  Skin no rashes no nodules  Psych oriented to time place and person normal memory  Musculoskeletal no cyanosis no clubbing normal range of motion          Estimated Creatinine Clearance: 124.5 mL/min (by C-G formula based on SCr of 0.65 mg/dL).         Imaging: I personally visualized the images of scans/x-rays " performed within last 3 days.      Assessment:  Pneumonia  Right paraesophageal collection/questionable abscess  Leukocytosis  History of mediastinitis  History of asthma      Recommendations:  At this time we have a young female with known history of mediastinitis with possible recurrence.  Will initiate broad-spectrum antibiotic Zosyn.  She has some allergy to vancomycin therefore will hold off and see what ID has to suggest  Consult infectious disease to guide antibiotic management  Thoracic surgery is already been consulted  She is currently on room air with minimal pneumonia noted on CT chest with minimal pneumonia symptoms  We will follow-up after thoracic surgery's recommendation              Mora Friedman MD  8/24/2023  17:16 EDT      Much of this encounter note is an electronic transcription/translation of spoken language to printed text using Dragon Software.    Electronically signed by Mora Friedman MD at 08/24/23 1725       Beverly Araya APRN at 08/24/23 1423        Consult Orders    1. Inpatient Thoracic Surgery Consult [[id number]] ordered by Kane Wood MD              Attestation signed by Arianna Velasquez MD at 08/25/23 0733    I have reviewed this documentation and agree.                      Inpatient Thoracic Surgery Consult  Consult performed by: Beverly Araya APRN  Consult ordered by: Kane Wood MD        Patient Care Team:  Provider, No Known as PCP - General    Chief Complaint   Patient presents with    Cough    URI       Subjective     History of Present Illness    Wilda Mayo is a 37-year-old female who presents to [facility] complaining of a progressive cough with pain radiating from her anterior chest posteriorly.  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 Hemanth La.  At that time there was no evidence of fluid  collection, esophageal or tracheal diverticulum or abnormality.  She did have an edematous plane which was suggestive of inflammation.  The patient was kept on antibiotics postoperatively and her leukocytosis improved.  There is suspicion that she likely can a contained perforation from unclear hollow viscus that led to cervical inflammation and mediastinitis.  The patient recovered well from her procedure.  She actually traveled to [place] recently.  She has had no recent sick contacts.  She developed a fever over the last couple of days and has had a cough which is nonproductive but causes significant pain in her chest which radiates posteriorly.  She represented to the emergency department today.  CT angiogram demonstrated no evidence of thromboembolus but there was evidence of recurrence of this paraesophageal mass/abnormality with mass effect on the esophagus and trachea.  Additionally, the patient appears to have developing pneumonia, as well.  The patient reports some mild nausea but no vomiting.  She denies difficulty with swallowing.  She reports no hemoptysis.  She was seen in the emergency department.  Her significant other is at bedside.    Review of Systems   Constitutional:  Positive for activity change, diaphoresis and fever.   HENT:  Negative for trouble swallowing.    Respiratory:  Positive for cough. Negative for shortness of breath, wheezing and stridor.    Cardiovascular:  Positive for chest pain. Negative for palpitations and leg swelling.   Gastrointestinal:  Positive for nausea. Negative for constipation, diarrhea and vomiting.   Genitourinary: Negative.    Skin: Negative.       Objective      Vital Signs  Temp:  [98.4 °F (36.9 °C)-99.6 °F (37.6 °C)] 98.4 °F (36.9 °C)  Heart Rate:  [] 92  Resp:  [18] 18  BP: (101-114)/(68-74) 108/69    Intake & Output (last day)         08/23 0701  08/24 0700 08/24 0701  08/25 0700    IV Piggyback  1000    Total Intake(mL/kg)  1000 (14.6)    Net  +1000                   Physical Exam  Vitals reviewed.   Constitutional:       Appearance: She is ill-appearing.   HENT:      Head: Normocephalic and atraumatic.   Eyes:      General: No scleral icterus.     Conjunctiva/sclera: Conjunctivae normal.   Cardiovascular:      Rate and Rhythm: Normal rate and regular rhythm.   Pulmonary:      Effort: Pulmonary effort is normal.      Breath sounds: No wheezing, rhonchi or rales.   Abdominal:      General: Bowel sounds are normal.      Palpations: Abdomen is soft. There is no mass.      Tenderness: There is no abdominal tenderness.   Musculoskeletal:      Cervical back: Neck supple.   Skin:     General: Skin is warm and dry.   Neurological:      General: No focal deficit present.      Mental Status: She is alert. Mental status is at baseline.   Psychiatric:         Mood and Affect: Mood normal.         Behavior: Behavior normal.         Thought Content: Thought content normal.         Judgment: Judgment normal.       Results Review:    I reviewed the patient's new clinical results.  I reviewed the patient's new imaging results and agree with the interpretation.  I reviewed the patient's other test results and agree with the interpretation  Discussed with patient, RN, Dr. La and Dr. Velasquez.          Assessment & Plan       Pneumonia    Paraesophageal mass    Assessment & Plan    Independently reviewed the CT angiogram which demonstrates a complex collection/mass containing air in the right superior paraesophageal region.  This measures 3.1 x 2.6 cm.  There is mass effect on the trachea and esophagus to the left.  New bilateral lower lobe airspace opacities demonstrated bronchiolitis/developing pneumonia with aspiration is a consideration.  No evidence of pulmonary thromboemboli.  Esophagram was also independently reviewed which demonstrates no evidence of leak/extravasation.    Paraesophageal mass: This appears to have a mass effect on the trachea and esophagus which both have a shift  to the left.  This is also evident on esophagram but there is no evidence of perforation.  We will plan a CT of the soft tissues of the neck for further evaluation.  We will keep the patient strict n.p.o. with no sips, ice chips or oral meds.    Pneumonia: Cannot rule out aspiration secondary to this compression of the esophagus.  Antibiotic treatment per infectious disease service.    I discussed the patients findings and our recommendations with patient, family, nursing staff, and Dr. Velasquez.    Thank you for this consult and allowing us to participate in the care of your patient.  We will follow along with you during this hospitalization.       ANNETTE Waller  [facility]  08/24/23  14:24 EDT    Greater than 72 minutes was spent reviewing the patient's chart, radiographic imaging, labs, provider notes, assessing the patient and developing a plan of care which was discussed with the patient/family and other providers.      Electronically signed by Arianna Velasquez MD at 08/25/23 0778

## 2023-08-30 NOTE — DISCHARGE SUMMARY
[facility]    Date of Discharge:  8/30/2023    PCP: Provider, No Known    Discharge Diagnosis:   Active Hospital Problems    Diagnosis  POA    **Pneumonia [J18.9]  Yes    Infection due to parainfluenza virus 4 [B34.8]  Yes    Neck pain [M54.2]  Yes    Asthma [J45.909]  Yes    Mediastinitis [J98.51]  Unknown    Paratracheal gas collection [R93.89]  Yes      Resolved Hospital Problems   No resolved problems to display.     Procedure(s):  ESOPHAGOGASTRODUODENOSCOPY  BRONCHOSCOPY  08/28 1437 UPPER GI ENDOSCOPY  08/28 1435 BRONCHOSCOPY  Consults       Date and Time Order Name Status Description    8/24/2023  5:25 PM Inpatient Infectious Diseases Consult Completed     8/24/2023  4:44 PM Inpatient Pulmonology Consult Completed     8/24/2023  2:10 PM [facility] (on-call MD unless specified) Details      8/24/2023 12:31 PM Inpatient Thoracic Surgery Consult Completed           Hospital Course  37 y.o. female  Patient admitted with peritracheal gas collection.  Thoracic surgery had performed exploratory procedure in April for several issues.  She is getting IV antibiotics and thoracic surgery planned to do EGD and bronchoscopy. Complex collection/mass containing air in the right superior paraesophageal region.  There is mass effect on trachea and esophagus on the left.  Thoracic surgery consulted for further input and management. RPP positive for parainfluenza 4 and this probably accounts for her most recent symptoms. She has a sick child at home so that is the likely contact. This is a self-limited illness.  She underwent bronchoscopy and EGD.  No plans for surgical intervention.    Yesterday's CT report and CT surgery note reviewed. Plans for follow-up with them in 1 month w/ repeat CT scan. I have changed her Zosyn to Augmentin 875-125 mg PO BID through 9/29/23 which will be a 4-5 week total course. I have scheduled her to see me the day following her appt w/ Dr La.         Temp:  [97.3 °F (36.3 °C)-98.2 °F (36.8 °C)] 98.1 °F (36.7 °C)  Heart Rate:  [64-97] 97  Resp:  [16-18] 18  BP: ()/(66-76) 103/72  Body mass index is 21.26 kg/m².    Physical Exam  General, awake and alert.  Head and ENT, normocephalic and atraumatic.  Lungs, symmetric expansion, equal air entry bilaterally.  Heart, regular rate and rhythm.  Abdomen, soft and nontender.  Extremities, no clubbing or cyanosis.  Neuro, no focal deficits.  Skin: Warm and no rash.  Psych, normal mood and affect.  Musculoskeletal, joint examination is grossly normal.      Disposition: Home or Self Care       Discharge Medications        New Medications        Instructions Start Date   amoxicillin-clavulanate 875-125 MG per tablet  Commonly known as: AUGMENTIN   1 tablet, Oral, Every 12 Hours Scheduled             Continue These Medications        Instructions Start Date   albuterol sulfate  (90 Base) MCG/ACT inhaler  Commonly known as: PROVENTIL HFA;VENTOLIN HFA;PROAIR HFA   2 puffs, Inhalation, Every 4 Hours PRN      Fluticasone-Salmeterol 250-50 MCG/ACT DISKUS  Commonly known as: ADVAIR/WIXELA   1 puff, Inhalation, 2 Times Daily - RT      montelukast 10 MG tablet  Commonly known as: SINGULAIR   10 mg, Oral, Nightly                Additional Instructions for the Follow-ups that You Need to Schedule       Discharge Follow-up with PCP   As directed       Currently Documented PCP:    Provider, No Known    PCP Phone Number:    [phone]     Follow Up Details: Follow-up with PCP in 7 days.  Follow-up with infectious disease in 2 to 3 weeks.  Follow-up with cardiothoracic surgery in 2 to 3 weeks.        CT soft tissue neck w contrast    Sep 29, 2023 (Approximate)      Release to patient: Routine Release               Follow-up Information       Hemanth La MD Follow up on 9/29/2023.    Specialty: Thoracic Surgery  Contact information:  [address] [place], [place] [address] [id number] [phone]               Provider, No  Known .    Why: Follow-up with PCP in 7 days.  Follow-up with infectious disease in 2 to 3 weeks.  Follow-up with cardiothoracic surgery in 2 to 3 weeks.  Contact information:  [place] [address] [phone]                            Future Appointments   Date Time Provider Department Center   9/18/2023  2:15 PM [facility] CT 1 [facility] CT BR None   9/28/2023 10:45 AM Hemanth La MD [facility]   9/29/2023  1:50 PM Yoan Giraldo MD [facility] ID [facility]        Juan Estrada MD  [facility]  08/30/23    Discharge time spent greater than 30 minutes.

## 2023-08-30 NOTE — PLAN OF CARE
Goal Outcome Evaluation:  Plan of Care Reviewed With: patient        Progress: improving  Outcome Evaluation: VSS, no complaints of pain.  IV antibiotics switched to oral.  Plan is to discharge pt home today and follow up with ID and thoracic outpatient.

## 2023-08-30 NOTE — PLAN OF CARE
Goal Outcome Evaluation:  Plan of Care Reviewed With: patient        Progress: improving  Outcome Evaluation: IVF and IV abx continue. VSS. Discharge today. Will continue to monitor.

## 2023-08-30 NOTE — PROGRESS NOTES
ID NOTE    CC: f/u Parainfluenza 4 and paraesophageal mass    Subj: No fever. Cough better. Eager for DC today.     Medications:    Current Facility-Administered Medications:     acetaminophen (TYLENOL) tablet 650 mg, 650 mg, Oral, Q6H PRN, Siri Moore APRN, 650 mg at 08/30/23 0612    albuterol (PROVENTIL) nebulizer solution 0.083% 2.5 mg/3mL, 2.5 mg, Nebulization, Q6H PRN, Juan Estrada MD    amoxicillin-clavulanate (AUGMENTIN) 875-125 MG per tablet 1 tablet, 1 tablet, Oral, Q12H, Yoan Giraldo MD, 1 tablet at 08/30/23 0820    diazePAM (VALIUM) injection 2.5 mg, 2.5 mg, Intravenous, Q6H PRN, Beverly Araya APRN    ipratropium-albuterol (DUO-NEB) nebulizer solution 3 mL, 3 mL, Nebulization, Q6H PRN, Reji Pinzon MD    morphine injection 2 mg, 2 mg, Intravenous, Q4H PRN, Hemanth La MD, 2 mg at 08/24/23 2233    ondansetron (ZOFRAN) injection 4 mg, 4 mg, Intravenous, Q6H PRN, Juan Estrada MD, 4 mg at 08/24/23 2234    sodium chloride 0.9 % flush 10 mL, 10 mL, Intravenous, Q12H, Juan Estrada MD, 10 mL at 08/30/23 0820    sodium chloride 0.9 % flush 10 mL, 10 mL, Intravenous, PRNSean Abhishek, MD    sodium chloride 0.9 % infusion 40 mL, 40 mL, Intravenous, PRNSean Abhishek, MD    sodium chloride 0.9 % infusion, 50 mL/hr, Intravenous, Continuous, Hemanth La MD, Last Rate: 50 mL/hr at 08/30/23 0732, 50 mL/hr at 08/30/23 0732      Objective   Vital Signs   Temp:  [97.3 °F (36.3 °C)-98.2 °F (36.8 °C)] 98.2 °F (36.8 °C)  Heart Rate:  [64-96] 88  Resp:  [16-20] 18  BP: ()/(66-79) 99/66    Physical Exam:   General: awake, alert, NAD, very nice  Eyes: no scleral icterus  Neck: healed incision w/o erythema or drainage  Cardiovascular: NR, no LE edema  Respiratory: BLL rales resolved; dry cough is better  Skin: No rashes  Neurological: Alert and oriented x 3  Psychiatric: calm and pleasant    Labs:   CBC, BMP, and blood cultures reviewed today  Lab Results   Component  "Value Date    WBC 7.41 08/30/2023    HGB 11.7 (L) 08/30/2023    HCT 35.1 08/30/2023    MCV 87.8 08/30/2023     (H) 08/30/2023     Lab Results   Component Value Date    GLUCOSE 90 08/30/2023    CALCIUM 9.0 08/30/2023     08/30/2023    K 3.9 08/30/2023    CO2 25.0 08/30/2023     08/30/2023    BUN 7 08/30/2023    CREATININE 0.63 08/30/2023    EGFR 118.8 08/30/2023    BCR 11.1 08/30/2023    ANIONGAP 10.0 08/30/2023     No results found for: CRP    HIV negative  HCG negative  Procal 0.08    Microbiology:  8/24 COVID: negative  8/24 BCx: negative (final)  8/25 RPP + paraflu 4    New Radiology:  CT Neck (8/29): \"1.  There is a persistent soft tissue mass at the level of T1 and T2 and   to the right of the trachea which has decreased slightly in size as   compared to 08/25/2023. The air centrally has decreased in volume. This   may represent a phlegmonous collection/abscess involving a pre-existing   paratracheal cyst. An underlying neoplastic process or mass cannot be   excluded. Continued surveillance is recommended.   2.  Small bilateral pleural fluid collections are appreciated which are   new versus 08/25/2023. \"    ASSESSMENT/PLAN:  Right paraesophageal fluid collection with mass effect  Bilateral lower lobe infiltrates  Fever in adult - resolved  Parainfluenza 4 infection with sinusitis    She remains afebrile and her WBC has normalized. Her symptoms from parainfluenza 4 appear resolved except for a nagging dry cough. I told her this can even take a week or two to resolve.     Yesterday's CT report and CT surgery note reviewed. Plans for follow-up with them in 1 month w/ repeat CT scan. I have changed her Zosyn to Augmentin 875-125 mg PO BID through 9/29/23 which will be a 4-5 week total course. I have scheduled her to see me the day following her appt w/ Dr La.     Thank you for allowing me to be involved in the care of this patient. Infectious diseases will sign off at this time with antibiotics " plan in place, but please call me at [phone] if any further ID questions or new ID concerns.

## 2023-08-31 ENCOUNTER — READMISSION MANAGEMENT (OUTPATIENT)
Dept: CALL CENTER | Facility: HOSPITAL | Age: 38
End: 2023-08-31
Payer: COMMERCIAL

## 2023-08-31 NOTE — PAYOR COMM NOTE
"Wilda Dunn (37 y.o. Female)        PLEASE SEE ATTACHED DC SUMMARY     REF#[id number]    THANK YOU    NICOLE LOCO LPN CCP   Date of Birth   1985    Social Security Number       Address   [address] [address]    Home Phone       MRN   [id number]       Christian   Non-Quaker    Marital Status                               Admission Date   8/24/23    Admission Type   Emergency    Admitting Provider   Juan Estrada MD    Attending Provider       Department, Room/Bed   [address], S601/1       Discharge Date   8/30/2023    Discharge Disposition   Home or Self Care    Discharge Destination                                 Attending Provider: (none)   Allergies: Vancomycin    Isolation: None   Infection: None   Code Status: Prior    Ht: 175.3 cm (69\")   Wt: 65.3 kg (143 lb 15.4 oz)    Admission Cmt: None   Principal Problem: Pneumonia [J18.9]                   Active Insurance as of 8/24/2023       Primary Coverage       Payor Plan Insurance Group Employer/Plan Group    [facility] ANTH PATHWAY TRANSITION HMO NON PAR [id number]       Payor Plan Address Payor Plan Phone Number Payor Plan Fax Number Effective Dates    [address]   4/1/2023 - None Entered    [place] [address]         Subscriber Name Subscriber Birth Date Member ID       WILDA DUNN 1985 [id number]                     Emergency Contacts        (Rel.) Home Phone Work Phone Mobile Phone    ROSCOE VERDUZCO (Spouse) -- -- [phone]                 Discharge Summary        Juan Estrada MD at 08/30/23 [address] [place] [address] [facility]    Date of Discharge:  8/30/2023    PCP: Provider, No Known    Discharge Diagnosis:   Active Hospital Problems    Diagnosis  POA    **Pneumonia [J18.9]  Yes    Infection due to parainfluenza virus 4 [B34.8]  Yes    Neck pain [M54.2]  Yes    Asthma " [J45.909]  Yes    Mediastinitis [J98.51]  Unknown    Paratracheal gas collection [R93.89]  Yes      Resolved Hospital Problems   No resolved problems to display.     Procedure(s):  ESOPHAGOGASTRODUODENOSCOPY  BRONCHOSCOPY  08/28 1437 UPPER GI ENDOSCOPY  08/28 1435 BRONCHOSCOPY  Consults       Date and Time Order Name Status Description    8/24/2023  5:25 PM Inpatient Infectious Diseases Consult Completed     8/24/2023  4:44 PM Inpatient Pulmonology Consult Completed     8/24/2023  2:10 PM [facility] (on-call MD unless specified) Details      8/24/2023 12:31 PM Inpatient Thoracic Surgery Consult Completed           Hospital Course  37 y.o. female  Patient admitted with peritracheal gas collection.  Thoracic surgery had performed exploratory procedure in April for several issues.  She is getting IV antibiotics and thoracic surgery planned to do EGD and bronchoscopy. Complex collection/mass containing air in the right superior paraesophageal region.  There is mass effect on trachea and esophagus on the left.  Thoracic surgery consulted for further input and management. RPP positive for parainfluenza 4 and this probably accounts for her most recent symptoms. She has a sick child at home so that is the likely contact. This is a self-limited illness.  She underwent bronchoscopy and EGD.  No plans for surgical intervention.    Yesterday's CT report and CT surgery note reviewed. Plans for follow-up with them in 1 month w/ repeat CT scan. I have changed her Zosyn to Augmentin 875-125 mg PO BID through 9/29/23 which will be a 4-5 week total course. I have scheduled her to see me the day following her appt w/ Dr La.        Temp:  [97.3 °F (36.3 °C)-98.2 °F (36.8 °C)] 98.1 °F (36.7 °C)  Heart Rate:  [64-97] 97  Resp:  [16-18] 18  BP: ()/(66-76) 103/72  Body mass index is 21.26 kg/m².    Physical Exam  General, awake and alert.  Head and ENT, normocephalic and atraumatic.  Lungs, symmetric expansion, equal air entry  bilaterally.  Heart, regular rate and rhythm.  Abdomen, soft and nontender.  Extremities, no clubbing or cyanosis.  Neuro, no focal deficits.  Skin: Warm and no rash.  Psych, normal mood and affect.  Musculoskeletal, joint examination is grossly normal.      Disposition: Home or Self Care       Discharge Medications        New Medications        Instructions Start Date   amoxicillin-clavulanate 875-125 MG per tablet  Commonly known as: AUGMENTIN   1 tablet, Oral, Every 12 Hours Scheduled             Continue These Medications        Instructions Start Date   albuterol sulfate  (90 Base) MCG/ACT inhaler  Commonly known as: PROVENTIL HFA;VENTOLIN HFA;PROAIR HFA   2 puffs, Inhalation, Every 4 Hours PRN      Fluticasone-Salmeterol 250-50 MCG/ACT DISKUS  Commonly known as: ADVAIR/WIXELA   1 puff, Inhalation, 2 Times Daily - RT      montelukast 10 MG tablet  Commonly known as: SINGULAIR   10 mg, Oral, Nightly                Additional Instructions for the Follow-ups that You Need to Schedule       Discharge Follow-up with PCP   As directed       Currently Documented PCP:    Provider, No Known    PCP Phone Number:    [phone]     Follow Up Details: Follow-up with PCP in 7 days.  Follow-up with infectious disease in 2 to 3 weeks.  Follow-up with cardiothoracic surgery in 2 to 3 weeks.        CT soft tissue neck w contrast    Sep 29, 2023 (Approximate)      Release to patient: Routine Release               Follow-up Information       Heamnth La MD Follow up on 9/29/2023.    Specialty: Thoracic Surgery  Contact information:  [address] [place], [place] [id number] [phone]               Provider, No Known .    Why: Follow-up with PCP in 7 days.  Follow-up with infectious disease in 2 to 3 weeks.  Follow-up with cardiothoracic surgery in 2 to 3 weeks.  Contact information:  [place] [id number] [phone]                            Future Appointments   Date Time Provider  Department Center   9/18/2023  2:15 PM KIAN [facility] CT 1 BH K[facility] CT BR None   9/28/2023 10:45 AM Hemanth La MD [facility] TS [facility]   9/29/2023  1:50 PM Yoan Giraldo MD [facility] ID [facility]        Juan Pinto MD  [facility]  08/30/23    Discharge time spent greater than 30 minutes.    Electronically signed by Juan Pinto MD at 08/30/23 1511       Discharge Order (From admission, onward)       Start     Ordered    08/30/23 1509  Discharge patient  Once        Expected Discharge Date: 08/30/23   Discharge Disposition: Home or Self Care   Physician of Record for Attribution - Please select from Treatment Team: JUAN PINTO [[id number]]   Review needed by CMO to determine Physician of Record: No      Question Answer Comment   Physician of Record for Attribution - Please select from Treatment Team JUAN PINTO    Review needed by CMO to determine Physician of Record No        08/30/23 1509

## 2023-08-31 NOTE — PROGRESS NOTES
"Enter Query Response Below      Query Response:   Abscess or Phlegmon     Electronically signed by Juan Estrada MD, 23, 10:46 AM EDT.               If applicable, please update the problem list.     Patient: Wilda Swenson        : 1985  Account: [id number]           Admit Date: 2023        How to Respond to this query:       a. Click New Note     b. Answer query within the yellow box.                c. Update the Problem List, if applicable.      If you have any questions about this query contact me at: [email or web address]"         37 year old woman with PMH of asthma and mediastinitis, admitted  with right sided chest discomfort, shortness of air, and neck pain, found to have parainfluenza virus 4, pneumonia, and mass containing air in the right superior paraesophageal region.   Thoracic Surgery note states: \"this may be representative of a phlegmonous collection/abscess.\"  Infectious Disease note  states: \"I have changed her Zosyn to Augmentin 875-125 mg PO BID through 23 which will be a 4-5 week total course.\"  CT chest  noted: \"an infectious or inflammatory etiology is suspected.\"  Treatment: IV Zosyn x7 days then discharged with Augmentin.      Please clarify if the paraesophageal mass can be further specified as:  - Abscess or Phlegmon  - Other infection, please specify  - Other, please specify  - Unable to determine      By submitting this query, we are merely seeking further clarification of documentation to accurately reflect all conditions that you are monitoring, evaluating, treating or that extend the hospitalization or utilize additional resources of care. Please utilize your independent clinical judgment when addressing the question(s) above.     This query and your response, once completed, will be entered into the legal medical record.    Sincerely,   Anila Reed RN, BSN  [email or web address]"  Clinical Documentation Integrity " Program

## 2023-08-31 NOTE — PROGRESS NOTES
"Enter Query Response Below      Query Response:   Both Aspiration pneumonia and Parainfluenza virus pneumonia     Electronically signed by Juan Estrada MD, 23, 10:46 AM EDT.               If applicable, please update the problem list.     Patient: Wilda Swenson        : 1985  Account: [id number]           Admit Date: 2023        How to Respond to this query:       a. Click New Note     b. Answer query within the yellow box.                c. Update the Problem List, if applicable.      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  Pulmonology notes  and  state: \"Aspiration pneumonia +/- pneumonitis.\"  DC Summary notes: \"RPP positive for parainfluenza 4 and this probably accounts for her most recent symptoms.\"  CT chest  notes: \"... likely represent bronchiolitis/developing pneumonia and aspiration is a consideration.\"  Treatment included IV Zosyn x7 days.      Please document if the Pneumonia being treated/monitored can be further specified as:  - Aspiration pneumonia  - Parainfluenza virus pneumonia  - Both Aspiration pneumonia and Parainfluenza virus pneumonia  - Bacterial pneumonia unspecified  - Other- please specify______  - Unable to determine      By submitting this query, we are merely seeking further clarification of documentation to accurately reflect all conditions that you are monitoring, evaluating, treating or that extend the hospitalization or utilize additional resources of care. Please utilize your independent clinical judgment when addressing the question(s) above.     This query and your response, once completed, will be entered into the legal " medical record.    Sincerely,   Anila Reed RN, BSN  [email or web address]  Clinical Documentation Integrity Program

## 2023-08-31 NOTE — CASE MANAGEMENT/SOCIAL WORK
Case Management Discharge Note      Final Note: DC'd home 8/30                 Transportation Services  Private: Car    Final Discharge Disposition Code: 01 - home or self-care

## 2023-08-31 NOTE — OUTREACH NOTE
Prep Survey      Flowsheet Row Responses   [facility] facility patient discharged from? [facility]   Is LACE score < 7 ? No   Eligibility Readm Mgmt   Discharge diagnosis Pneumonia   Does the patient have one of the following disease processes/diagnoses(primary or secondary)? Pneumonia   Does the patient have Home health ordered? No   Is there a DME ordered? No   General alerts for this patient Needs    Prep survey completed? Yes            Pauline BARNES - Registered Nurse

## 2023-09-15 ENCOUNTER — READMISSION MANAGEMENT (OUTPATIENT)
Dept: CALL CENTER | Facility: HOSPITAL | Age: 38
End: 2023-09-15
Payer: COMMERCIAL

## 2023-09-15 NOTE — OUTREACH NOTE
COPD/PN Week 3 Survey      Flowsheet Row Responses   [facility] patient discharged from? [facility]   Does the patient have one of the following disease processes/diagnoses(primary or secondary)? Pneumonia   Week 3 attempt successful? No   Call start time 1105   Unsuccessful attempts Attempt 1  [After having  call Pt did not answer. ]   General alerts for this patient Needs    Discharge diagnosis Pneumonia   If primary language is not English what is the name and relationship or agency of  used? [id number]            MONA EDMOND - Registered Nurse

## 2023-09-18 ENCOUNTER — READMISSION MANAGEMENT (OUTPATIENT)
Dept: CALL CENTER | Facility: HOSPITAL | Age: 38
End: 2023-09-18
Payer: COMMERCIAL

## 2023-09-18 NOTE — OUTREACH NOTE
COPD/PN Week 3 Survey      Flowsheet Row Responses   [employer] facility patient discharged from? [facility]   Does the patient have one of the following disease processes/diagnoses(primary or secondary)? Pneumonia   Week 3 attempt successful? No   Unsuccessful attempts Attempt 2            Christina GRADNE - Registered Nurse

## 2023-09-29 ENCOUNTER — TELEPHONE (OUTPATIENT)
Dept: SURGERY | Facility: CLINIC | Age: 38
End: 2023-09-29

## 2023-09-29 ENCOUNTER — TELEPHONE (OUTPATIENT)
Dept: SURGERY | Facility: CLINIC | Age: 38
End: 2023-09-29
Payer: COMMERCIAL

## 2023-09-29 ENCOUNTER — OFFICE VISIT (OUTPATIENT)
Dept: INFECTIOUS DISEASES | Facility: CLINIC | Age: 38
End: 2023-09-29
Payer: COMMERCIAL

## 2023-09-29 VITALS
HEART RATE: 101 BPM | RESPIRATION RATE: 20 BRPM | SYSTOLIC BLOOD PRESSURE: 105 MMHG | TEMPERATURE: 97.5 F | BODY MASS INDEX: 21.06 KG/M2 | WEIGHT: 142.6 LBS | DIASTOLIC BLOOD PRESSURE: 68 MMHG

## 2023-09-29 DIAGNOSIS — R05.2 SUBACUTE COUGH: Primary | ICD-10-CM

## 2023-09-29 DIAGNOSIS — K22.89 ESOPHAGEAL MASS: ICD-10-CM

## 2023-09-29 DIAGNOSIS — Z79.2 LONG TERM (CURRENT) USE OF ANTIBIOTICS: ICD-10-CM

## 2023-09-29 PROCEDURE — 99214 OFFICE O/P EST MOD 30 MIN: CPT | Performed by: INTERNAL MEDICINE

## 2023-09-29 RX ORDER — AMOXICILLIN AND CLAVULANATE POTASSIUM 875; 125 MG/1; MG/1
1 TABLET, FILM COATED ORAL EVERY 12 HOURS SCHEDULED
Qty: 14 TABLET | Refills: 0 | Status: SHIPPED | OUTPATIENT
Start: 2023-09-29 | End: 2023-10-06

## 2023-09-29 NOTE — PROGRESS NOTES
ID CLINIC NOTE    CC: f/u Parainfluenza 4 and paraesophageal mass     HPI: Wilda Mayo is a 37 y.o. female here for f/u Parainfluenza 4 and paraesophageal mass. I saw her in the hospital from 8/25-8/30/23 for this problem. She was also evaluated by thoracic surgery. She was treated with Zosyn in-house then discharged on Augmentin 875-125 mg PO BID for a 4-week course with stop date of today. The plan had been for her to have a CT neck/chest and thoracic surgery appointment prior to her appointment here. However, the CT was cancelled due to insurance reasons. And therefore her clinic appt was scheduled as well.     No fevers, chills, or sweats. The cough is still present. Some days are better than others. She is having some sinus symptoms she describes as pressure. No purulent drainage. She has been tolerating the Augmentin w/o rash or diarrhea.      Past Medical History:   Diagnosis Date    Asthma     Pneumonia 08/24/2023       Past Surgical History:   Procedure Laterality Date    BRONCHOSCOPY N/A 8/28/2023    Procedure: BRONCHOSCOPY;  Surgeon: Hemanth La MD;  Location: [facility] ENDOSCOPY;  Service: Gastroenterology;  Laterality: N/A;  pre- abnormal ct chest, f/u parainfluenza virus   post- mediastinitis    ENDOSCOPY N/A 8/28/2023    Procedure: ESOPHAGOGASTRODUODENOSCOPY;  Surgeon: Hemanth La MD;  Location: [facility] ENDOSCOPY;  Service: Gastroenterology;  Laterality: N/A;  pre- abnormal ct chest, f/u parainfluenza virus  post- mediastinitis    ESOPHAGUS SURGERY N/A 04/11/2023    Procedure: EXPLORATION OF NECK, EXCISION OF NECK MASS, EGD, AND FLEXIBLE BRONCHOSCOPY;  Surgeon: Hemanth La MD;  Location: [facility] MAIN OR;  Service: Thoracic;  Laterality: N/A;     Social History:  Moved to [place] from [place] about 6 years ago ([place]); moved to [place] end of 2022  Lives w/ 2 year old son and      Antibiotic allergies and intolerances:    Vancomycin - infusion related rash in April 2023    Medications:  "    Current Outpatient Medications:     albuterol sulfate  (90 Base) MCG/ACT inhaler, Inhale 2 puffs Every 4 (Four) Hours As Needed for Wheezing., Disp: , Rfl:     amoxicillin-clavulanate (AUGMENTIN) 875-125 MG per tablet, Take 1 tablet by mouth Every 12 (Twelve) Hours for 59 doses. Indications: neck abscess, Disp: 59 tablet, Rfl: 0    Fluticasone-Salmeterol (ADVAIR/WIXELA) 250-50 MCG/ACT DISKUS, Inhale 1 puff 2 (Two) Times a Day., Disp: , Rfl:     montelukast (SINGULAIR) 10 MG tablet, Take 1 tablet by mouth Every Night., Disp: , Rfl:       OBJECTIVE:  /68   Pulse 101   Temp 97.5 °F (36.4 °C)   Resp 20   Wt 64.7 kg (142 lb 9.6 oz)   LMP 08/17/2023 (Approximate)   BMI 21.06 kg/m²       General: awake, alert, NAD, very nice  Eyes: no scleral icterus  Neck: healed incision w/o erythema or drainage  Cardiovascular: tachycardic, no LE edema  Respiratory: no rales but does have wheezing  Skin: No rashes  Neurological: Alert and oriented x 3  Psychiatric: calm and pleasant      DIAGNOSTICS:    Lab Results   Component Value Date    WBC 7.41 08/30/2023    HGB 11.7 (L) 08/30/2023    HCT 35.1 08/30/2023     (H) 08/30/2023     Lab Results   Component Value Date    GLUCOSE 90 08/30/2023    CALCIUM 9.0 08/30/2023     08/30/2023    K 3.9 08/30/2023    CO2 25.0 08/30/2023     08/30/2023    BUN 7 08/30/2023    CREATININE 0.63 08/30/2023    EGFR 118.8 08/30/2023    BCR 11.1 08/30/2023    ANIONGAP 10.0 08/30/2023     HIV negative  HCG negative  Procal 0.08     Microbiology:  8/24 COVID: negative  8/24 BCx: negative   8/25 RPP + paraflu 4    Prior Radiology:  CT Neck (8/29):   \"1.  There is a persistent soft tissue mass at the level of T1 and T2 and to the right of the trachea which has decreased slightly in size as compared to 08/25/2023. The air centrally has decreased in volume. This may represent a phlegmonous collection/abscess involving a pre-existing paratracheal cyst. An underlying " "neoplastic process or mass cannot be   excluded. Continued surveillance is recommended.   2.  Small bilateral pleural fluid collections are appreciated which are new versus 08/25/2023.\"     ASSESSMENT/PLAN:  Right paraesophageal fluid collection with mass effect  Bilateral lower lobe infiltrates  Parainfluenza 4 infection with sinusitis  Long term use of antibiotics  Chronic cough    She still has a persistent cough. She is also having some sinus pressure. CT neck has not yet been done for what sounds like insurance reasons. She has reached out to the thoracic surgery office about next steps. She is scheduled to see them in about one week. I am going to keep her on Augmentin 875-125 mg PO BID until that time. RX sent in.     Due to description of her sinus symptoms and chronic cough, I am also going to order a CT of her sinuses at the same time she has the CT neck done.     Labs reviewed. No new labs needed today.     RTC pending above test results.   "

## 2023-09-29 NOTE — TELEPHONE ENCOUNTER
Caller: ROSCOE VERDUZCO    Relationship: Emergency Contact    Best call back number: [phone]     Do you know the name of the person who called: CENTRAL SCHEDULING     What was the call regarding: PATIENTS CT WAS CANCELLED DUE TO INSURANCE. PATIENT WOULD LIKE A CALL BACK WHEN SCHEDULED AT DIFFERENT FACILITY.     Is it okay if the provider responds through MyChart: PATIENTS SPOUSE ROSCOE WOULD LIKE A CALL BACK.

## 2023-10-06 ENCOUNTER — TELEPHONE (OUTPATIENT)
Dept: INFECTIOUS DISEASES | Facility: CLINIC | Age: 38
End: 2023-10-06
Payer: COMMERCIAL

## 2023-10-06 DIAGNOSIS — J01.40 ACUTE NON-RECURRENT PANSINUSITIS: Primary | ICD-10-CM

## 2023-10-06 NOTE — TELEPHONE ENCOUNTER
Per Dr. Giraldo's request, I called patient and she had her spouse Noe talk w/ me on the phone since he is also speaks English and she speaks Icelandic. I informed spouse that per Dr. Giraldo, he reviewed her CT Sinus and it showed significant sinusitis and so he has referred her to an ENT physician. I informed him that someone from Dr. Jose Taylor's office (the ENT MD office) should be contacting them to set up an appt. I told him he could call our office if they have not heard from them in the next week. He stated understanding and denied having any questions.

## 2023-10-06 NOTE — TELEPHONE ENCOUNTER
----- Message from Yoan Giraldo MD sent at 10/6/2023  2:53 PM EDT -----  Please let patient know that her CT sinus showed significant sinusitis so I have referred her to an ENT physician. Thank you.

## 2025-07-19 ENCOUNTER — HOSPITAL ENCOUNTER (EMERGENCY)
Facility: HOSPITAL | Age: 40
Discharge: HOME | End: 2025-07-20
Attending: STUDENT IN AN ORGANIZED HEALTH CARE EDUCATION/TRAINING PROGRAM
Payer: COMMERCIAL

## 2025-07-19 DIAGNOSIS — H57.89 EYE IRRITATION: Primary | ICD-10-CM

## 2025-07-19 PROCEDURE — 99283 EMERGENCY DEPT VISIT LOW MDM: CPT

## 2025-07-19 RX ORDER — FLUORESCEIN SODIUM 1 MG/MG
1 STRIP OPHTHALMIC ONCE
Status: COMPLETED | OUTPATIENT
Start: 2025-07-20 | End: 2025-07-20

## 2025-07-20 VITALS
OXYGEN SATURATION: 98 % | TEMPERATURE: 98 F | SYSTOLIC BLOOD PRESSURE: 127 MMHG | DIASTOLIC BLOOD PRESSURE: 94 MMHG | HEART RATE: 82 BPM | RESPIRATION RATE: 16 BRPM

## 2025-07-20 RX ORDER — CIPROFLOXACIN HYDROCHLORIDE 3.5 MG/ML
1 SOLUTION/ DROPS TOPICAL EVERY 4 HOURS
Qty: 2.5 ML | Refills: 0 | Status: SHIPPED | OUTPATIENT
Start: 2025-07-20 | End: 2025-07-25

## 2025-07-20 RX ADMIN — FLUORESCEIN SODIUM 1 STRIP: 1 STRIP OPHTHALMIC at 00:07

## 2025-07-20 NOTE — [facility] PROVIDER NOTE
Subjective   History of Present Illness  Pt is a 39 y.o. female with PMH as listed who presents for   Chief Complaint   Patient presents with    Eye Problem     Contact stuck in eye       Patient is a 39-year-old female presents for concern of possible contact stuck in right eye.  She states that she has been on the boat today and when she would take out her contact she was unable to get it out.  Visual acuity is at baseline for patient states that she is able to move the left contact but not the right but vision is about the same in both.      Review of Systems    Past Medical History:   Diagnosis Date    Asthma     Pneumonia 08/24/2023       Allergies   Allergen Reactions    Vancomycin Rash       Past Surgical History:   Procedure Laterality Date    BRONCHOSCOPY N/A 8/28/2023    Procedure: BRONCHOSCOPY;  Surgeon: Hemanth La MD;  Location: [facility] ENDOSCOPY;  Service: Gastroenterology;  Laterality: N/A;  pre- abnormal ct chest, f/u parainfluenza virus   post- mediastinitis    ENDOSCOPY N/A 8/28/2023    Procedure: ESOPHAGOGASTRODUODENOSCOPY;  Surgeon: Hemanth La MD;  Location: [facility] ENDOSCOPY;  Service: Gastroenterology;  Laterality: N/A;  pre- abnormal ct chest, f/u parainfluenza virus  post- mediastinitis    ESOPHAGUS SURGERY N/A 04/11/2023    Procedure: EXPLORATION OF NECK, EXCISION OF NECK MASS, EGD, AND FLEXIBLE BRONCHOSCOPY;  Surgeon: Hemanth La MD;  Location: [facility] OR;  Service: Thoracic;  Laterality: N/A;       Family History   Problem Relation Age of Onset    Cancer Mother     Cancer Paternal Aunt        Social History     Socioeconomic History    Marital status:    Tobacco Use    Smoking status: Never     Passive exposure: Never    Smokeless tobacco: Never   Vaping Use    Vaping status: Never Used   Substance and Sexual Activity    Alcohol use: Never    Drug use: Never    Sexual activity: Yes     Partners: Male     Birth control/protection: None           Objective   Physical  Exam  Constitutional:       Appearance: Normal appearance.   HENT:      Head: Normocephalic and atraumatic.      Mouth/Throat:      Mouth: Mucous membranes are moist.      Pharynx: Oropharynx is clear.   Eyes:      General:         Right eye: No discharge.         Left eye: No discharge.      Extraocular Movements: Extraocular movements intact.      Conjunctiva/sclera: Conjunctivae normal.      Pupils: Pupils are equal, round, and reactive to light.      Comments: No foreign body or contact present, possible small amount of fluorescein uptake on lateral aspect of right eye.   Cardiovascular:      Rate and Rhythm: Normal rate.   Pulmonary:      Effort: Pulmonary effort is normal.   Abdominal:      General: Abdomen is flat.   Musculoskeletal:      Cervical back: Neck supple.   Skin:     General: Skin is warm and dry.   Neurological:      Mental Status: She is alert.   Psychiatric:         Mood and Affect: Mood normal.         Procedures           ED Course  ED Course as of 07/20/25 0005   Sun Jul 20, 2025   0002 Patient is a 39-year-old female presents for concern of possible contact stuck in right eye.  She states that she has been on the boat today and when she would take out her contact she was unable to get it out.  Visual acuity is at baseline for patient states that she is able to move the left contact but not the right but vision is about the same in both.  Foreseen staining showed no contact to be in place.  Discussed with patient plan for discharge with PCP and ophthalmology follow-up, given Cipro prescription for any small corneal abrasion that may be present.  Patient understands and agrees, all questions answered. [JF]      ED Course User Index  [JF] Naresh Bhakta MD                                           Medical Decision Making  My differential diagnoses for ocular issues includes but is not limited to acute eye pain, chemical conjunctivitis, allergic conjunctivitis, infectious conjunctivitis,  gonococcal conjunctivitis, corneal abrasion, corneal ulcer, injury to cornea from contact lens, corneal foreign body.  Corneal alkali burn, corneal acid burn, decreased vision, acute glaucoma, herpes keratitis, hyphema, acute iritis, orbital wall fracture, penetrating eye injury, retinal detachment, temporal arteritis, UV keratitis, central retinal artery occlusion, CVA.        Risk  Prescription drug management.        Final diagnoses:   Eye irritation       ED Disposition  ED Disposition       ED Disposition   Discharge    Condition   Stable    Comment   --               [facility] - [place] [id number]  [phone]  Schedule an appointment as soon as possible for a visit in 3 days  Follow-up with your PCP or call to schedule appointment to establish care., For re-evaluation    [facility] [place] [facility]  [address] [address]  [phone]  Call in 2 days  to establish care, For re-evaluation         Medication List        New Prescriptions      ciprofloxacin 0.3 % ophthalmic solution  Commonly known as: CILOXAN  Administer 1 drop to the right eye Every 4 (Four) Hours for 5 days.               Where to Get Your Medications        These medications were sent to [facility]/pharmacy #[id number] - [place], [place] - [address] AT [facility] - [phone]  - [phone]   [address] [facility] [address]      Phone: [phone]   ciprofloxacin 0.3 % ophthalmic solution

## (undated) DEVICE — RICH MAJOR PROCEDURE: Brand: MEDLINE INDUSTRIES, INC.

## (undated) DEVICE — TUBING, SUCTION, 1/4" X 20', STRAIGHT: Brand: MEDLINE INDUSTRIES, INC.

## (undated) DEVICE — SINGLE USE BIOPSY VALVE MAJ-210: Brand: SINGLE USE BIOPSY VALVE (STERILE)

## (undated) DEVICE — ELECTRD BLD EZ CLN MOD 6.5IN

## (undated) DEVICE — GOWN,NON-REINFORCED,SIRUS,SET IN SLV,XXL: Brand: MEDLINE

## (undated) DEVICE — SINGLE USE SUCTION VALVE MAJ-209: Brand: SINGLE USE SUCTION VALVE (STERILE)

## (undated) DEVICE — APPL CHLORAPREP HI/LITE 26ML ORNG

## (undated) DEVICE — TUBING, SUCTION, 1/4" X 10', STRAIGHT: Brand: MEDLINE

## (undated) DEVICE — ADAPT SWVL FIBROPTIC BRONCH

## (undated) DEVICE — SUT PDS 3/0 SH 27IN DYED Z316H

## (undated) DEVICE — SUT SILK 2/0 FS BLK 18IN 685G

## (undated) DEVICE — BLCK/BITE BLOX W/DENTL/RIM W/STRAP 54F

## (undated) DEVICE — VAGINAL PACKING: Brand: DEROYAL

## (undated) DEVICE — DRSNG TELFA PAD NONADH STR 1S 3X4IN

## (undated) DEVICE — TOTAL TRAY, 16FR 10ML SIL FOLEY, URN: Brand: MEDLINE

## (undated) DEVICE — MSK AIRWY LARYNG LMA PILOT SZ4

## (undated) DEVICE — TOWEL,OR,DSP,ST,NATURAL,DLX,4/PK,20PK/CS: Brand: MEDLINE

## (undated) DEVICE — VITAL SIGNS™ JACKSON-REES CIRCUITS: Brand: VITAL SIGNS™

## (undated) DEVICE — CANN O2 ETCO2 FITS ALL CONN CO2 SMPL A/ 7IN DISP LF

## (undated) DEVICE — PENROSE DRAIN 18" X 5/8": Brand: CARDINAL HEALTH

## (undated) DEVICE — PCH INST SURG INVISISHIELD 2PCKT

## (undated) DEVICE — SUT SILK 0 PSL 18IN 580H

## (undated) DEVICE — PK CHST BRST 40

## (undated) DEVICE — SPONGE,DISSECTOR,K,XRAY,9/16"X1/4",STRL: Brand: MEDLINE

## (undated) DEVICE — SENSR O2 OXIMAX FNGR A/ 18IN NONSTR

## (undated) DEVICE — VIOLET BRAIDED (POLYGLACTIN 910), SYNTHETIC ABSORBABLE SUTURE: Brand: COATED VICRYL

## (undated) DEVICE — ELECTRD BLD EZ CLN MOD XLNG 2.75IN

## (undated) DEVICE — SUT SILK 3/0 SH CR5 18IN C0135

## (undated) DEVICE — JACKSON-PRATT 100CC BULB RESERVOIR: Brand: CARDINAL HEALTH

## (undated) DEVICE — ANTIBACTERIAL UNDYED BRAIDED (POLYGLACTIN 910), SYNTHETIC ABSORBABLE SUTURE: Brand: COATED VICRYL

## (undated) DEVICE — STPLR SKIN VISISTAT WD 35CT

## (undated) DEVICE — 3M™ IOBAN™ 2 ANTIMICROBIAL INCISE DRAPE 6650EZ: Brand: IOBAN™ 2

## (undated) DEVICE — ADAPT CLN BIOGUARD AIR/H2O DISP

## (undated) DEVICE — SUT SILK 0 TIES 30IN A306H

## (undated) DEVICE — GLV SURG BIOGEL LTX PF 8

## (undated) DEVICE — SUT SILK 3/0 SH 30IN K832H

## (undated) DEVICE — PLUG,CATHETER,DRAINAGE PROTECTOR,TUBE: Brand: MEDLINE

## (undated) DEVICE — DRN JP FLT NO TROC SIL 3/4PERF 10MM

## (undated) DEVICE — PENROSE DRAIN, 36 X 1 2: Brand: CARDINAL HEALTH

## (undated) DEVICE — LN SMPL CO2 SHTRM SD STREAM W/M LUER

## (undated) DEVICE — ADHS SKIN SURG TISS VISC PREMIERPRO EXOFIN HI/VISC FAST/DRY

## (undated) DEVICE — SUT VIC 2 TP1 54IN J880T

## (undated) DEVICE — PENCL ES MEGADINE EZ/CLEAN BUTN W/HOLSTR 10FT

## (undated) DEVICE — KT ORCA ORCAPOD DISP STRL

## (undated) DEVICE — CONTAINER,SPECIMEN,OR STERILE,4OZ: Brand: MEDLINE

## (undated) DEVICE — CATHETER,URETHRAL,REDRUBBER,STRL,16FR: Brand: MEDLINE